# Patient Record
Sex: FEMALE | Race: BLACK OR AFRICAN AMERICAN | Employment: UNEMPLOYED | URBAN - METROPOLITAN AREA
[De-identification: names, ages, dates, MRNs, and addresses within clinical notes are randomized per-mention and may not be internally consistent; named-entity substitution may affect disease eponyms.]

---

## 2017-01-09 ENCOUNTER — HOSPITAL ENCOUNTER (OUTPATIENT)
Dept: PHYSICAL THERAPY | Age: 57
Discharge: HOME | End: 2017-01-31
Attending: EMERGENCY MEDICINE | Admitting: EMERGENCY MEDICINE

## 2017-01-09 ASSESSMENT — PAIN SCALES - GENERAL: PAINLEVEL_OUTOF10: 6

## 2017-01-09 ASSESSMENT — PAIN DESCRIPTION - ORIENTATION: ORIENTATION: POSTERIOR;LOWER

## 2017-01-09 ASSESSMENT — PAIN DESCRIPTION - FREQUENCY: FREQUENCY: CONTINUOUS

## 2017-01-09 ASSESSMENT — PAIN DESCRIPTION - DESCRIPTORS: DESCRIPTORS: ACHING;BURNING

## 2017-01-09 ASSESSMENT — PAIN DESCRIPTION - PAIN TYPE: TYPE: CHRONIC PAIN

## 2017-01-09 ASSESSMENT — PAIN DESCRIPTION - LOCATION: LOCATION: BACK

## 2017-01-12 ENCOUNTER — HOSPITAL ENCOUNTER (OUTPATIENT)
Dept: PHYSICAL THERAPY | Age: 57
Discharge: HOME | End: 2017-01-12
Admitting: EMERGENCY MEDICINE

## 2017-01-18 ENCOUNTER — HOSPITAL ENCOUNTER (OUTPATIENT)
Dept: PHYSICAL THERAPY | Age: 57
Discharge: HOME | End: 2017-01-18
Admitting: EMERGENCY MEDICINE

## 2017-01-25 ENCOUNTER — HOSPITAL ENCOUNTER (OUTPATIENT)
Dept: PHYSICAL THERAPY | Age: 57
Discharge: HOME | End: 2017-01-25
Admitting: EMERGENCY MEDICINE

## 2017-01-30 ENCOUNTER — HOSPITAL ENCOUNTER (OUTPATIENT)
Dept: PHYSICAL THERAPY | Age: 57
Discharge: HOME | End: 2017-01-30
Admitting: EMERGENCY MEDICINE

## 2017-02-01 ENCOUNTER — HOSPITAL ENCOUNTER (OUTPATIENT)
Dept: OTHER | Age: 57
Discharge: HOME | End: 2017-02-28
Attending: EMERGENCY MEDICINE | Admitting: EMERGENCY MEDICINE

## 2017-03-01 ENCOUNTER — HOSPITAL ENCOUNTER (OUTPATIENT)
Dept: OTHER | Age: 57
Discharge: HOME | End: 2017-03-31
Attending: EMERGENCY MEDICINE | Admitting: EMERGENCY MEDICINE

## 2018-12-17 ENCOUNTER — APPOINTMENT (OUTPATIENT)
Dept: CT IMAGING | Age: 58
End: 2018-12-17
Payer: COMMERCIAL

## 2018-12-17 ENCOUNTER — APPOINTMENT (OUTPATIENT)
Dept: GENERAL RADIOLOGY | Age: 58
End: 2018-12-17
Payer: COMMERCIAL

## 2018-12-17 ENCOUNTER — HOSPITAL ENCOUNTER (OUTPATIENT)
Age: 58
Setting detail: OBSERVATION
Discharge: HOME | End: 2018-12-19
Attending: EMERGENCY MEDICINE | Admitting: INTERNAL MEDICINE
Payer: COMMERCIAL

## 2018-12-17 DIAGNOSIS — N39.0 URINARY TRACT INFECTION WITHOUT HEMATURIA, SITE UNSPECIFIED: ICD-10-CM

## 2018-12-17 DIAGNOSIS — R42 DIZZINESS: Primary | ICD-10-CM

## 2018-12-17 DIAGNOSIS — R00.2 PALPITATIONS: ICD-10-CM

## 2018-12-17 LAB
ALBUMIN SERPL-MCNC: 4.4 GM/DL (ref 3.4–5)
ALP BLD-CCNC: 94 IU/L (ref 40–129)
ALT SERPL-CCNC: 12 U/L (ref 10–40)
ANION GAP SERPL CALCULATED.3IONS-SCNC: 12 MMOL/L (ref 4–16)
AST SERPL-CCNC: 15 IU/L (ref 15–37)
BACTERIA: ABNORMAL /HPF
BASOPHILS ABSOLUTE: 0.1 K/CU MM
BASOPHILS RELATIVE PERCENT: 0.8 % (ref 0–1)
BILIRUB SERPL-MCNC: 0.3 MG/DL (ref 0–1)
BILIRUBIN URINE: NEGATIVE MG/DL
BLOOD, URINE: NEGATIVE
BUN BLDV-MCNC: 16 MG/DL (ref 6–23)
CALCIUM SERPL-MCNC: 9.8 MG/DL (ref 8.3–10.6)
CHLORIDE BLD-SCNC: 98 MMOL/L (ref 99–110)
CLARITY: ABNORMAL
CO2: 26 MMOL/L (ref 21–32)
COLOR: YELLOW
CREAT SERPL-MCNC: 0.9 MG/DL (ref 0.6–1.1)
DIFFERENTIAL TYPE: ABNORMAL
EOSINOPHILS ABSOLUTE: 0.1 K/CU MM
EOSINOPHILS RELATIVE PERCENT: 2 % (ref 0–3)
GFR AFRICAN AMERICAN: >60 ML/MIN/1.73M2
GFR NON-AFRICAN AMERICAN: >60 ML/MIN/1.73M2
GLUCOSE BLD-MCNC: 125 MG/DL (ref 70–99)
GLUCOSE, URINE: NEGATIVE MG/DL
HCT VFR BLD CALC: 42.7 % (ref 37–47)
HEMOGLOBIN: 13.3 GM/DL (ref 12.5–16)
IMMATURE NEUTROPHIL %: 0.2 % (ref 0–0.43)
KETONES, URINE: NEGATIVE MG/DL
LEUKOCYTE ESTERASE, URINE: ABNORMAL
LYMPHOCYTES ABSOLUTE: 1.5 K/CU MM
LYMPHOCYTES RELATIVE PERCENT: 23.9 % (ref 24–44)
MAGNESIUM: 1.9 MG/DL (ref 1.8–2.4)
MCH RBC QN AUTO: 28.8 PG (ref 27–31)
MCHC RBC AUTO-ENTMCNC: 31.1 % (ref 32–36)
MCV RBC AUTO: 92.4 FL (ref 78–100)
MONOCYTES ABSOLUTE: 0.7 K/CU MM
MONOCYTES RELATIVE PERCENT: 10.9 % (ref 0–4)
MUCUS: ABNORMAL HPF
NITRITE URINE, QUANTITATIVE: NEGATIVE
NUCLEATED RBC %: 0 %
PDW BLD-RTO: 13.2 % (ref 11.7–14.9)
PH, URINE: 6 (ref 5–8)
PLATELET # BLD: 348 K/CU MM (ref 140–440)
PMV BLD AUTO: 9.3 FL (ref 7.5–11.1)
POTASSIUM SERPL-SCNC: 3.9 MMOL/L (ref 3.5–5.1)
PROTEIN UA: NEGATIVE MG/DL
RBC # BLD: 4.62 M/CU MM (ref 4.2–5.4)
RBC URINE: 1 /HPF (ref 0–6)
SEGMENTED NEUTROPHILS ABSOLUTE COUNT: 3.8 K/CU MM
SEGMENTED NEUTROPHILS RELATIVE PERCENT: 62.2 % (ref 36–66)
SODIUM BLD-SCNC: 136 MMOL/L (ref 135–145)
SPECIFIC GRAVITY UA: 1.01 (ref 1–1.03)
SQUAMOUS EPITHELIAL: 29 /HPF
TOTAL IMMATURE NEUTOROPHIL: 0.01 K/CU MM
TOTAL NUCLEATED RBC: 0 K/CU MM
TOTAL PROTEIN: 8.4 GM/DL (ref 6.4–8.2)
TRICHOMONAS: ABNORMAL /HPF
TROPONIN T: <0.01 NG/ML
TSH HIGH SENSITIVITY: 1.25 UIU/ML (ref 0.27–4.2)
UNCLASSIFIED CAST: 21 /LPF
UROBILINOGEN, URINE: NORMAL MG/DL (ref 0.2–1)
WBC # BLD: 6.1 K/CU MM (ref 4–10.5)
WBC UA: 7 /HPF (ref 0–5)

## 2018-12-17 PROCEDURE — 6370000000 HC RX 637 (ALT 250 FOR IP): Performed by: EMERGENCY MEDICINE

## 2018-12-17 PROCEDURE — 2580000003 HC RX 258: Performed by: PHYSICIAN ASSISTANT

## 2018-12-17 PROCEDURE — 93226 XTRNL ECG REC<48 HR SCAN A/R: CPT

## 2018-12-17 PROCEDURE — 70496 CT ANGIOGRAPHY HEAD: CPT

## 2018-12-17 PROCEDURE — 84443 ASSAY THYROID STIM HORMONE: CPT

## 2018-12-17 PROCEDURE — 96365 THER/PROPH/DIAG IV INF INIT: CPT

## 2018-12-17 PROCEDURE — 71045 X-RAY EXAM CHEST 1 VIEW: CPT

## 2018-12-17 PROCEDURE — 81001 URINALYSIS AUTO W/SCOPE: CPT

## 2018-12-17 PROCEDURE — 87086 URINE CULTURE/COLONY COUNT: CPT

## 2018-12-17 PROCEDURE — 84484 ASSAY OF TROPONIN QUANT: CPT

## 2018-12-17 PROCEDURE — 70450 CT HEAD/BRAIN W/O DYE: CPT

## 2018-12-17 PROCEDURE — 6360000002 HC RX W HCPCS: Performed by: PHYSICIAN ASSISTANT

## 2018-12-17 PROCEDURE — 6360000004 HC RX CONTRAST MEDICATION: Performed by: PHYSICIAN ASSISTANT

## 2018-12-17 PROCEDURE — 85025 COMPLETE CBC W/AUTO DIFF WBC: CPT

## 2018-12-17 PROCEDURE — 70498 CT ANGIOGRAPHY NECK: CPT

## 2018-12-17 PROCEDURE — 80053 COMPREHEN METABOLIC PANEL: CPT

## 2018-12-17 PROCEDURE — 83735 ASSAY OF MAGNESIUM: CPT

## 2018-12-17 PROCEDURE — 36415 COLL VENOUS BLD VENIPUNCTURE: CPT

## 2018-12-17 PROCEDURE — 99285 EMERGENCY DEPT VISIT HI MDM: CPT

## 2018-12-17 PROCEDURE — 93005 ELECTROCARDIOGRAM TRACING: CPT | Performed by: EMERGENCY MEDICINE

## 2018-12-17 RX ORDER — MECLIZINE HYDROCHLORIDE 25 MG/1
25 TABLET ORAL ONCE
Status: COMPLETED | OUTPATIENT
Start: 2018-12-17 | End: 2018-12-17

## 2018-12-17 RX ORDER — TRIMETHOPRIM 100 MG/1
100 TABLET ORAL 2 TIMES DAILY
Status: ON HOLD | COMMUNITY
End: 2018-12-19 | Stop reason: HOSPADM

## 2018-12-17 RX ADMIN — CEFTRIAXONE SODIUM 1 G: 1 INJECTION, POWDER, FOR SOLUTION INTRAMUSCULAR; INTRAVENOUS at 23:25

## 2018-12-17 RX ADMIN — MECLIZINE HYDROCHLORIDE 25 MG: 25 TABLET ORAL at 22:00

## 2018-12-17 RX ADMIN — IOPAMIDOL 100 ML: 755 INJECTION, SOLUTION INTRAVENOUS at 22:35

## 2018-12-17 NOTE — Clinical Note
Patient Class: Observation [104]   REQUIRED: Diagnosis: Dizziness [306134]   Estimated Length of Stay: Estimated stay of less than 2 midnights   Future Attending Provider: Calvin Pham [[id number]]

## 2018-12-18 LAB
LV EF: 60 %
LVEF MODALITY: NORMAL

## 2018-12-18 PROCEDURE — 93306 TTE W/DOPPLER COMPLETE: CPT

## 2018-12-18 PROCEDURE — 2580000003 HC RX 258: Performed by: INTERNAL MEDICINE

## 2018-12-18 PROCEDURE — 93225 XTRNL ECG REC<48 HRS REC: CPT

## 2018-12-18 PROCEDURE — 6360000002 HC RX W HCPCS: Performed by: FAMILY MEDICINE

## 2018-12-18 PROCEDURE — 96372 THER/PROPH/DIAG INJ SC/IM: CPT

## 2018-12-18 PROCEDURE — G0378 HOSPITAL OBSERVATION PER HR: HCPCS

## 2018-12-18 PROCEDURE — 6360000002 HC RX W HCPCS: Performed by: INTERNAL MEDICINE

## 2018-12-18 PROCEDURE — 2580000003 HC RX 258: Performed by: FAMILY MEDICINE

## 2018-12-18 PROCEDURE — 6370000000 HC RX 637 (ALT 250 FOR IP): Performed by: INTERNAL MEDICINE

## 2018-12-18 RX ORDER — SODIUM CHLORIDE 0.9 % (FLUSH) 0.9 %
10 SYRINGE (ML) INJECTION PRN
Status: DISCONTINUED | OUTPATIENT
Start: 2018-12-18 | End: 2018-12-19 | Stop reason: HOSPADM

## 2018-12-18 RX ORDER — ONDANSETRON 2 MG/ML
4 INJECTION INTRAMUSCULAR; INTRAVENOUS EVERY 6 HOURS PRN
Status: DISCONTINUED | OUTPATIENT
Start: 2018-12-18 | End: 2018-12-18 | Stop reason: SDUPTHER

## 2018-12-18 RX ORDER — AMLODIPINE BESYLATE 10 MG/1
10 TABLET ORAL DAILY
Status: DISCONTINUED | OUTPATIENT
Start: 2018-12-18 | End: 2018-12-19

## 2018-12-18 RX ORDER — ONDANSETRON 2 MG/ML
4 INJECTION INTRAMUSCULAR; INTRAVENOUS EVERY 6 HOURS PRN
Status: DISCONTINUED | OUTPATIENT
Start: 2018-12-18 | End: 2018-12-19 | Stop reason: HOSPADM

## 2018-12-18 RX ORDER — TRIAMTERENE AND HYDROCHLOROTHIAZIDE 37.5; 25 MG/1; MG/1
1 TABLET ORAL DAILY
Status: DISCONTINUED | OUTPATIENT
Start: 2018-12-18 | End: 2018-12-19

## 2018-12-18 RX ORDER — ATENOLOL 50 MG/1
50 TABLET ORAL 2 TIMES DAILY
Status: DISCONTINUED | OUTPATIENT
Start: 2018-12-18 | End: 2018-12-19 | Stop reason: HOSPADM

## 2018-12-18 RX ORDER — SODIUM CHLORIDE 0.9 % (FLUSH) 0.9 %
10 SYRINGE (ML) INJECTION EVERY 12 HOURS SCHEDULED
Status: DISCONTINUED | OUTPATIENT
Start: 2018-12-18 | End: 2018-12-19 | Stop reason: HOSPADM

## 2018-12-18 RX ORDER — TRIMETHOPRIM 100 MG/1
100 TABLET ORAL 2 TIMES DAILY
Status: DISCONTINUED | OUTPATIENT
Start: 2018-12-18 | End: 2018-12-18

## 2018-12-18 RX ADMIN — ATENOLOL 50 MG: 50 TABLET ORAL at 02:16

## 2018-12-18 RX ADMIN — ATENOLOL 50 MG: 50 TABLET ORAL at 09:44

## 2018-12-18 RX ADMIN — ENOXAPARIN SODIUM 40 MG: 40 INJECTION SUBCUTANEOUS at 09:44

## 2018-12-18 RX ADMIN — SODIUM CHLORIDE, PRESERVATIVE FREE 10 ML: 5 INJECTION INTRAVENOUS at 21:08

## 2018-12-18 RX ADMIN — AMLODIPINE BESYLATE 10 MG: 10 TABLET ORAL at 09:44

## 2018-12-18 RX ADMIN — CEFTRIAXONE 1 G: 1 INJECTION, POWDER, FOR SOLUTION INTRAMUSCULAR; INTRAVENOUS at 21:08

## 2018-12-18 RX ADMIN — TRIAMTERENE AND HYDROCHLOROTHIAZIDE 1 TABLET: 37.5; 25 TABLET ORAL at 09:44

## 2018-12-18 RX ADMIN — SODIUM CHLORIDE, PRESERVATIVE FREE 10 ML: 5 INJECTION INTRAVENOUS at 11:04

## 2018-12-18 ASSESSMENT — PAIN SCALES - GENERAL
PAINLEVEL_OUTOF10: 0

## 2018-12-18 NOTE — H&P
mouth 2 times daily   Yes Historical Provider, MD   atenolol (TENORMIN) 50 MG tablet Take 50 mg by mouth 2 times daily   Yes Historical Provider, MD   amLODIPine (NORVASC) 10 MG tablet Take 10 mg by mouth daily   Yes Historical Provider, MD   triamterene-hydrochlorothiazide (MAXZIDE-25) 37.5-25 MG per tablet Take 1 tablet by mouth daily   Yes Historical Provider, MD   ondansetron (ZOFRAN ODT) 4 MG disintegrating tablet Take 1 tablet by mouth every 8 hours as needed for Nausea or Vomiting 4/17/16   Deangelo Tripathi MD   naproxen (NAPROSYN) 500 MG tablet Take 1 tablet by mouth 2 times daily as needed for Pain 8/13/15 8/23/15  Jorje Kehr Brumfield, PA      Medications:    cefTRIAXone (ROCEPHIN) IV  1 g Intravenous Once      Infusions:   PRN Meds:        Electronically signed by Clary Javed MD on 12/17/2018 at 11:46 PM

## 2018-12-18 NOTE — ED NOTES
Handoff report called to RN [facility] on 3N     Dianna Lux RN  12/18/18 [id number]

## 2018-12-18 NOTE — ED PROVIDER NOTES
I independently examined and evaluated Néstor Schwartz. In brief, presenting with dizziness lasting approx 5-10 mins. She reports she is having difficulty with walking. Endorsing some palpitations. Vitals:    18 1836   BP: (!) 159/82   Pulse: 62   Resp: 16   Temp: 97.8 °F (36.6 °C)   SpO2: 99%     Focused exam revealed     General appearance:  No acute distress. Skin:  Warm. Dry. Eye:  Extraocular movements intact. Ears, nose, mouth and throat:  Oral mucosa moist   Neck:  Trachea midline. Extremity:  No swelling. range of motion intact  Heart:  Regular rate and rhythm, normal S1 & S2, no extra heart sounds. Perfusion:  intact  Respiratory:  Lungs clear to auscultation bilaterally. Respirations nonlabored. Abdominal:  Normal bowel sounds. Soft. Nontender. Non distended. Back:  No CVA tenderness to palpation     NEUROLOGICAL: Awake and alert. GCS 15. Cranial nerves 2-12 grossly intact. Strength 5/5 throughout. Light touch sensation intact throughout. Finger to nose WNL. DTR's 2+ in all 4 extremities. Abnormal gait         EK lead EKG per my interpretation:  Normal Sinus Rhythm 72  Axis is   Normal  QTc is  within an acceptable range  There is no specific T wave changes appreciated. There is no specific ST wave changes appreciated. Prior EKG to compare with was available 2018      ED course:  62-year-old female with complaints of dizziness. Labs noted to be remarkable for leukocytosis and WBC. However upon ambulation she is reporting symptoms. Patient hemodynamically stable in the emergency department. CT had unremarkable as well. Given that patient has not followed up with a cardiologist and continues to be symptomatic plan for patient to be admitted for further workup. All diagnostic, treatment, and disposition decisions were made by myself in conjunction with the advanced practice provider.     For all further details of the patient's emergency department visit,

## 2018-12-18 NOTE — PROGRESS NOTES
Hospitalist Progress Note      Name:  Heriberto Harvey /Age/Sex: 1960  (63 y.o. female)   MRN & CSN:  [id number] & [id number] Admission Date/Time: 2018  8:02 PM   Location:  [address] [place] [address] PCP: Ace Diaz MD       Heriberto Harvey is a 63 y.o.  female  who presents with Dizziness (x2 today while sitting )      Assessment and Plan:   Dizziness, unsteady gait with recurrent palpitation  Echo pending  Pt/ot  Orthostatics   tele  Cardio consulted, possible holter? UTI  - IV Rocephin   - urine cx pending       HTN  - Borderline hypotensive now, hold meds with parameters, can be causing dizziness            Diet DIET CARDIAC;   Code Status Full Code     Medications:   Medications:    amLODIPine  10 mg Oral Daily    atenolol  50 mg Oral BID    trimethoprim  100 mg Oral BID    triamterene-hydrochlorothiazide  1 tablet Oral Daily    sodium chloride flush  10 mL Intravenous 2 times per day    enoxaparin  40 mg Subcutaneous Daily    sodium chloride flush  10 mL Intravenous 2 times per day      Infusions:   PRN Meds:   sodium chloride flush 10 mL PRN   magnesium hydroxide 30 mL Daily PRN   ondansetron 4 mg Q6H PRN   sodium chloride flush 10 mL PRN     Subjective:   Feels a bit better this am    Objective:   No intake or output data in the 24 hours ending 18 1006   Vitals:   Vitals:    18 0800   BP: 111/72   Pulse: 77   Resp:    Temp:    SpO2: 98%     Physical Exam:   Gen:  awake, alert, cooperative, no apparent distress  Head/Eyes:  Normocephalic atraumatic, EOMI   NECK:   symmetrical, trachea midline  LUNGS: Normal Effort   CARDIOVASCULAR:  Normal rate  ABDOMEN: Non tender, non distended, no HSM noted. MUSCULOSKELETAL:  ROM WNL  NEUROLOGIC: Alert and Oriented,  Cranial nerves II-XII are grossly intact.    SKIN:  no bruising or bleeding, normal skin color,  no redness      Data:       CBC   Recent Labs      18   WBC  6.1   HGB  13.3   HCT  42.7   PLT  348      BMP Recent Labs

## 2018-12-19 ENCOUNTER — APPOINTMENT (OUTPATIENT)
Dept: NUCLEAR MEDICINE | Age: 58
End: 2018-12-19
Payer: COMMERCIAL

## 2018-12-19 VITALS
TEMPERATURE: 98.3 F | HEART RATE: 81 BPM | RESPIRATION RATE: 17 BRPM | WEIGHT: 208.6 LBS | OXYGEN SATURATION: 96 % | HEIGHT: 67 IN | DIASTOLIC BLOOD PRESSURE: 68 MMHG | SYSTOLIC BLOOD PRESSURE: 120 MMHG | BODY MASS INDEX: 32.74 KG/M2

## 2018-12-19 LAB
ACQUISITION DURATION: NORMAL S
AVERAGE HEART RATE: 70 BPM
CULTURE: NORMAL
EKG DIAGNOSIS: NORMAL
HOOKUP DATE: NORMAL
HOOKUP TIME: NORMAL
LV EF: 60 %
LVEF MODALITY: NORMAL
Lab: NORMAL
MAX HEART RATE TIME/DATE: NORMAL
MAX HEART RATE: 121 BPM
MIN HEART RATE TIME/DATE: NORMAL
MIN HEART RATE: 56 BPM
NUMBER OF QRS COMPLEXES: NORMAL
NUMBER OF SUPRAVENTRICULAR BEATS IN RUNS: 0
NUMBER OF SUPRAVENTRICULAR COUPLETS: 1
NUMBER OF SUPRAVENTRICULAR ECTOPICS: 9
NUMBER OF SUPRAVENTRICULAR ISOLATED BEATS: 7
NUMBER OF SUPRAVENTRICULAR RUNS: 0
NUMBER OF VENTRICULAR BEATS IN RUNS: 0
NUMBER OF VENTRICULAR BIGEMINAL CYCLES: 3
NUMBER OF VENTRICULAR COUPLETS: 0
NUMBER OF VENTRICULAR ECTOPICS: 108
NUMBER OF VENTRICULAR ISOLATED BEATS: 108
NUMBER OF VENTRICULAR RUNS: 0
REPORT STATUS: NORMAL
SPECIMEN: NORMAL
TOTAL COLONY COUNT: NORMAL

## 2018-12-19 PROCEDURE — G8980 MOBILITY D/C STATUS: HCPCS

## 2018-12-19 PROCEDURE — 3430000000 HC RX DIAGNOSTIC RADIOPHARMACEUTICAL: Performed by: INTERNAL MEDICINE

## 2018-12-19 PROCEDURE — G8979 MOBILITY GOAL STATUS: HCPCS

## 2018-12-19 PROCEDURE — 94761 N-INVAS EAR/PLS OXIMETRY MLT: CPT

## 2018-12-19 PROCEDURE — A9500 TC99M SESTAMIBI: HCPCS | Performed by: INTERNAL MEDICINE

## 2018-12-19 PROCEDURE — G0378 HOSPITAL OBSERVATION PER HR: HCPCS

## 2018-12-19 PROCEDURE — 6360000002 HC RX W HCPCS: Performed by: INTERNAL MEDICINE

## 2018-12-19 PROCEDURE — 93017 CV STRESS TEST TRACING ONLY: CPT

## 2018-12-19 PROCEDURE — 97116 GAIT TRAINING THERAPY: CPT

## 2018-12-19 PROCEDURE — 78452 HT MUSCLE IMAGE SPECT MULT: CPT

## 2018-12-19 PROCEDURE — 97162 PT EVAL MOD COMPLEX 30 MIN: CPT

## 2018-12-19 PROCEDURE — 6370000000 HC RX 637 (ALT 250 FOR IP): Performed by: FAMILY MEDICINE

## 2018-12-19 PROCEDURE — G8978 MOBILITY CURRENT STATUS: HCPCS

## 2018-12-19 PROCEDURE — 93010 ELECTROCARDIOGRAM REPORT: CPT | Performed by: INTERNAL MEDICINE

## 2018-12-19 RX ORDER — CIPROFLOXACIN 500 MG/1
500 TABLET, FILM COATED ORAL 2 TIMES DAILY
Qty: 14 TABLET | Refills: 0 | Status: SHIPPED | OUTPATIENT
Start: 2018-12-19 | End: 2018-12-26

## 2018-12-19 RX ADMIN — REGADENOSON 0.4 MG: 0.08 INJECTION, SOLUTION INTRAVENOUS at 09:35

## 2018-12-19 RX ADMIN — Medication 10 MILLICURIE: at 07:35

## 2018-12-19 RX ADMIN — Medication 30 MILLICURIE: at 09:35

## 2018-12-19 RX ADMIN — ATENOLOL 50 MG: 50 TABLET ORAL at 11:32

## 2018-12-19 ASSESSMENT — PAIN SCALES - GENERAL
PAINLEVEL_OUTOF10: 0
PAINLEVEL_OUTOF10: 0

## 2018-12-19 NOTE — DISCHARGE SUMMARY
Desmond Madsen 1960 [id number]  PCP:  Nessa Guerrero MD    Admit date: 12/17/2018  Admitting Physician: Juanita Baires MD    Discharge date: 12/19/2018 Discharge Physician: Mamie Rivas MD         Hospital Course and Discharge Diagnoses Include:    Pt feels well and wants to go home. Dizziness, unsteady gait with recurrent palpitation  - resolved likely was due to hypotension from too many BP meds, + orthostatics. Stopped norvasc and maxide per cardio recs. Okay for BB  Orthostatics  EKG; NSR with no acute changes  Cardio consulted, stress test done and showed no ischemia     UTI  - IV Rocephin   - urine cx + opportunistic pathogen but since symptomatic will d/c on oral cipro x 7 days        HTN  - stable        Physical Exam on Discharge date: 12/19/18  Gen:  awake, alert, cooperative, no apparent distress  Head/Eyes:  Normocephalic atraumatic, EOMI   NECK:   symmetrical, trachea midline  LUNGS: Normal Effort   CARDIOVASCULAR:  Normal rate  ABDOMEN: Non tender, non distended, no HSM noted. MUSCULOSKELETAL:  ROM WNL  NEUROLOGIC: Alert and Oriented,  Cranial nerves II-XII are grossly intact. SKIN:  no bruising or bleeding, normal skin color,  no redness    Procedures:  See above  Ct Head Wo Contrast    Result Date: 12/17/2018  EXAMINATION: CT OF THE HEAD WITHOUT CONTRAST  12/17/2018 9:27 pm TECHNIQUE: CT of the head was performed without the administration of intravenous contrast. Dose modulation, iterative reconstruction, and/or weight based adjustment of the mA/kV was utilized to reduce the radiation dose to as low as reasonably achievable. COMPARISON: None. HISTORY: ORDERING SYSTEM PROVIDED HISTORY: dizziness TECHNOLOGIST PROVIDED HISTORY: Has a \"code stroke\" or \"stroke alert\" been called? ->No Ordering Physician Provided Reason for Exam: dizziness FINDINGS: BRAIN/VENTRICLES: There is no acute intracranial hemorrhage, mass effect or midline shift. No abnormal extra-axial fluid collection.   The of the larynx and pharynx appear unremarkable. The parotid, submandibular and thyroid glands demonstrate no acute abnormality. BONES: The visualized osseous structures appear unremarkable. CTA HEAD: ANTERIOR CIRCULATION: The internal carotid arteries are normal in course and caliber without focal stenosis. The anterior cerebral and middle cerebral arteries demonstrate no focal stenosis. POSTERIOR CIRCULATION: The right V4 segment is congenitally diminutive. The posterior cerebral arteries demonstrate no focal stenosis. The vertebral and basilar arteries appear unremarkable. BRAIN: No mass effect or midline shift. No abnormal extra-axial fluid collection. The gray-white differentiation appears grossly maintained. Unremarkable CTA of the head and neck. Cta Head W Contrast    Result Date: 12/17/2018  EXAMINATION: CTA OF THE NECK; CTA OF THE HEAD WITH CONTRAST 12/17/2018 10:47 pm; 12/17/2018 10:46 pm: TECHNIQUE: CTA of the neck was performed with the administration of intravenous contrast. Multiplanar reformatted images are provided for review. MIP images are provided for review. Stenosis of the internal carotid arteries measured using NASCET criteria. Dose modulation, iterative reconstruction, and/or weight based adjustment of the mA/kV was utilized to reduce the radiation dose to as low as reasonably achievable.; CTA of the head/brain was performed with the administration of intravenous contrast. Multiplanar reformatted images are provided for review. MIP images are provided for review. Dose modulation, iterative reconstruction, and/or weight based adjustment of the mA/kV was utilized to reduce the radiation dose to as low as reasonably achievable. COMPARISON: None. HISTORY: ORDERING SYSTEM PROVIDED HISTORY: dizziness TECHNOLOGIST PROVIDED HISTORY: Has a \"code stroke\" or \"stroke alert\" been called? ->No Ordering Physician Provided Reason for Exam:  two episodes of dizziness while sitting today, states she

## 2018-12-20 LAB
EKG ATRIAL RATE: 72 BPM
EKG DIAGNOSIS: NORMAL
EKG P AXIS: 70 DEGREES
EKG P-R INTERVAL: 188 MS
EKG Q-T INTERVAL: 398 MS
EKG QRS DURATION: 84 MS
EKG QTC CALCULATION (BAZETT): 435 MS
EKG R AXIS: 79 DEGREES
EKG T AXIS: 72 DEGREES
EKG VENTRICULAR RATE: 72 BPM

## 2019-12-17 ENCOUNTER — OFFICE VISIT (OUTPATIENT)
Dept: FAMILY MEDICINE CLINIC | Age: 59
End: 2019-12-17
Payer: MEDICARE

## 2019-12-17 VITALS
OXYGEN SATURATION: 97 % | HEART RATE: 73 BPM | WEIGHT: 211 LBS | BODY MASS INDEX: 33.05 KG/M2 | SYSTOLIC BLOOD PRESSURE: 130 MMHG | DIASTOLIC BLOOD PRESSURE: 84 MMHG

## 2019-12-17 DIAGNOSIS — R73.9 HYPERGLYCEMIA: ICD-10-CM

## 2019-12-17 DIAGNOSIS — I10 ESSENTIAL HYPERTENSION: Primary | ICD-10-CM

## 2019-12-17 DIAGNOSIS — R53.82 CHRONIC FATIGUE: ICD-10-CM

## 2019-12-17 LAB — HBA1C MFR BLD: 6.2 %

## 2019-12-17 PROCEDURE — G8417 CALC BMI ABV UP PARAM F/U: HCPCS | Performed by: FAMILY MEDICINE

## 2019-12-17 PROCEDURE — 1036F TOBACCO NON-USER: CPT | Performed by: FAMILY MEDICINE

## 2019-12-17 PROCEDURE — 99203 OFFICE O/P NEW LOW 30 MIN: CPT | Performed by: FAMILY MEDICINE

## 2019-12-17 PROCEDURE — 3017F COLORECTAL CA SCREEN DOC REV: CPT | Performed by: FAMILY MEDICINE

## 2019-12-17 PROCEDURE — 83036 HEMOGLOBIN GLYCOSYLATED A1C: CPT | Performed by: FAMILY MEDICINE

## 2019-12-17 PROCEDURE — G8427 DOCREV CUR MEDS BY ELIG CLIN: HCPCS | Performed by: FAMILY MEDICINE

## 2019-12-17 PROCEDURE — G8484 FLU IMMUNIZE NO ADMIN: HCPCS | Performed by: FAMILY MEDICINE

## 2019-12-17 RX ORDER — HYDROCHLOROTHIAZIDE 12.5 MG/1
12.5 CAPSULE, GELATIN COATED ORAL DAILY
Refills: 1 | COMMUNITY
Start: 2019-12-11 | End: 2019-12-17

## 2019-12-17 RX ORDER — ATENOLOL 50 MG/1
50 TABLET ORAL 2 TIMES DAILY
Qty: 180 TABLET | Refills: 5 | Status: SHIPPED | OUTPATIENT
Start: 2019-12-17 | End: 2020-03-17 | Stop reason: SDUPTHER

## 2019-12-17 RX ORDER — SULFAMETHOXAZOLE AND TRIMETHOPRIM 800; 160 MG/1; MG/1
1 TABLET ORAL
COMMUNITY
End: 2019-12-17

## 2019-12-17 ASSESSMENT — ENCOUNTER SYMPTOMS
VISUAL CHANGE: 0
COUGH: 0
SINUS PRESSURE: 0
ABDOMINAL PAIN: 0
DIARRHEA: 0
SHORTNESS OF BREATH: 0
BACK PAIN: 0
CHEST TIGHTNESS: 0
VOMITING: 0
NAUSEA: 0
SWOLLEN GLANDS: 0
WHEEZING: 0
CHANGE IN BOWEL HABIT: 0
SORE THROAT: 0
CONSTIPATION: 0

## 2019-12-17 ASSESSMENT — PATIENT HEALTH QUESTIONNAIRE - PHQ9
2. FEELING DOWN, DEPRESSED OR HOPELESS: 0
SUM OF ALL RESPONSES TO PHQ9 QUESTIONS 1 & 2: 0
SUM OF ALL RESPONSES TO PHQ QUESTIONS 1-9: 0
1. LITTLE INTEREST OR PLEASURE IN DOING THINGS: 0
SUM OF ALL RESPONSES TO PHQ QUESTIONS 1-9: 0

## 2020-01-28 ENCOUNTER — NURSE ONLY (OUTPATIENT)
Dept: FAMILY MEDICINE CLINIC | Age: 60
End: 2020-01-28
Payer: MEDICARE

## 2020-01-28 DIAGNOSIS — I10 ESSENTIAL HYPERTENSION: ICD-10-CM

## 2020-01-28 DIAGNOSIS — R53.82 CHRONIC FATIGUE: ICD-10-CM

## 2020-01-28 LAB
A/G RATIO: 1.1 (ref 1.1–2.2)
ALBUMIN SERPL-MCNC: 4.4 G/DL (ref 3.4–5)
ALP BLD-CCNC: 83 U/L (ref 40–129)
ALT SERPL-CCNC: 17 U/L (ref 10–40)
ANION GAP SERPL CALCULATED.3IONS-SCNC: 14 MMOL/L (ref 3–16)
AST SERPL-CCNC: 18 U/L (ref 15–37)
BASOPHILS ABSOLUTE: 0.1 K/UL (ref 0–0.2)
BASOPHILS RELATIVE PERCENT: 1 %
BILIRUB SERPL-MCNC: 0.3 MG/DL (ref 0–1)
BUN BLDV-MCNC: 14 MG/DL (ref 7–20)
CALCIUM SERPL-MCNC: 9.8 MG/DL (ref 8.3–10.6)
CHLORIDE BLD-SCNC: 103 MMOL/L (ref 99–110)
CHOLESTEROL, TOTAL: 254 MG/DL (ref 0–199)
CO2: 25 MMOL/L (ref 21–32)
CREAT SERPL-MCNC: 0.8 MG/DL (ref 0.6–1.1)
EOSINOPHILS ABSOLUTE: 0.1 K/UL (ref 0–0.6)
EOSINOPHILS RELATIVE PERCENT: 1.2 %
GFR AFRICAN AMERICAN: >60
GFR NON-AFRICAN AMERICAN: >60
GLOBULIN: 4.1 G/DL
GLUCOSE BLD-MCNC: 93 MG/DL (ref 70–99)
HCT VFR BLD CALC: 40.4 % (ref 36–48)
HDLC SERPL-MCNC: 81 MG/DL (ref 40–60)
HEMOGLOBIN: 13.6 G/DL (ref 12–16)
LDL CHOLESTEROL CALCULATED: 159 MG/DL
LYMPHOCYTES ABSOLUTE: 1.2 K/UL (ref 1–5.1)
LYMPHOCYTES RELATIVE PERCENT: 18.1 %
MCH RBC QN AUTO: 29.4 PG (ref 26–34)
MCHC RBC AUTO-ENTMCNC: 33.6 G/DL (ref 31–36)
MCV RBC AUTO: 87.5 FL (ref 80–100)
MONOCYTES ABSOLUTE: 0.5 K/UL (ref 0–1.3)
MONOCYTES RELATIVE PERCENT: 7.3 %
NEUTROPHILS ABSOLUTE: 4.7 K/UL (ref 1.7–7.7)
NEUTROPHILS RELATIVE PERCENT: 72.4 %
PDW BLD-RTO: 13.7 % (ref 12.4–15.4)
PLATELET # BLD: 310 K/UL (ref 135–450)
PMV BLD AUTO: 8.1 FL (ref 5–10.5)
POTASSIUM SERPL-SCNC: 4.1 MMOL/L (ref 3.5–5.1)
RBC # BLD: 4.62 M/UL (ref 4–5.2)
SODIUM BLD-SCNC: 142 MMOL/L (ref 136–145)
T4 FREE: 1.3 NG/DL (ref 0.9–1.8)
TOTAL PROTEIN: 8.5 G/DL (ref 6.4–8.2)
TRIGL SERPL-MCNC: 68 MG/DL (ref 0–150)
TSH SERPL DL<=0.05 MIU/L-ACNC: 1.65 UIU/ML (ref 0.27–4.2)
VLDLC SERPL CALC-MCNC: 14 MG/DL
WBC # BLD: 6.6 K/UL (ref 4–11)

## 2020-01-28 PROCEDURE — 36415 COLL VENOUS BLD VENIPUNCTURE: CPT | Performed by: FAMILY MEDICINE

## 2020-03-17 ENCOUNTER — OFFICE VISIT (OUTPATIENT)
Dept: FAMILY MEDICINE CLINIC | Age: 60
End: 2020-03-17
Payer: MEDICARE

## 2020-03-17 VITALS
WEIGHT: 222.2 LBS | HEIGHT: 67 IN | DIASTOLIC BLOOD PRESSURE: 80 MMHG | HEART RATE: 72 BPM | OXYGEN SATURATION: 97 % | SYSTOLIC BLOOD PRESSURE: 130 MMHG | BODY MASS INDEX: 34.88 KG/M2

## 2020-03-17 PROBLEM — R73.03 PREDIABETES: Status: ACTIVE | Noted: 2020-03-17

## 2020-03-17 PROBLEM — E78.2 MIXED HYPERLIPIDEMIA: Status: ACTIVE | Noted: 2020-03-17

## 2020-03-17 PROBLEM — I10 ESSENTIAL HYPERTENSION: Status: ACTIVE | Noted: 2020-03-17

## 2020-03-17 PROCEDURE — G8427 DOCREV CUR MEDS BY ELIG CLIN: HCPCS | Performed by: FAMILY MEDICINE

## 2020-03-17 PROCEDURE — 3017F COLORECTAL CA SCREEN DOC REV: CPT | Performed by: FAMILY MEDICINE

## 2020-03-17 PROCEDURE — 1036F TOBACCO NON-USER: CPT | Performed by: FAMILY MEDICINE

## 2020-03-17 PROCEDURE — G8484 FLU IMMUNIZE NO ADMIN: HCPCS | Performed by: FAMILY MEDICINE

## 2020-03-17 PROCEDURE — G8417 CALC BMI ABV UP PARAM F/U: HCPCS | Performed by: FAMILY MEDICINE

## 2020-03-17 PROCEDURE — 99213 OFFICE O/P EST LOW 20 MIN: CPT | Performed by: FAMILY MEDICINE

## 2020-03-17 RX ORDER — ATENOLOL 50 MG/1
50 TABLET ORAL 2 TIMES DAILY
Qty: 180 TABLET | Refills: 5 | Status: SHIPPED | OUTPATIENT
Start: 2020-03-17 | End: 2020-12-24 | Stop reason: ALTCHOICE

## 2020-03-17 ASSESSMENT — PATIENT HEALTH QUESTIONNAIRE - PHQ9
SUM OF ALL RESPONSES TO PHQ QUESTIONS 1-9: 0
1. LITTLE INTEREST OR PLEASURE IN DOING THINGS: 0
SUM OF ALL RESPONSES TO PHQ QUESTIONS 1-9: 0
2. FEELING DOWN, DEPRESSED OR HOPELESS: 0
SUM OF ALL RESPONSES TO PHQ9 QUESTIONS 1 & 2: 0

## 2020-03-17 ASSESSMENT — ENCOUNTER SYMPTOMS
COUGH: 0
SHORTNESS OF BREATH: 0

## 2020-03-17 NOTE — PROGRESS NOTES
Gini Denis  1960  03/17/20    Chief Complaint   Patient presents with    3 Month Follow-Up    Hypertension     and discuss the lab results           Patient here for 3 months f/u regarding HTN, and discuss the lab results, The patient is taking hypertensive medications compliantly without side effects. Denies chest pain, dyspnea, edema, or TIA's. Past Medical History:   Diagnosis Date    Arthritis     Hypertension      Past Surgical History:   Procedure Laterality Date     SECTION       History reviewed. No pertinent family history.   Social History     Socioeconomic History    Marital status: Legally      Spouse name: Not on file    Number of children: Not on file    Years of education: Not on file    Highest education level: Not on file   Occupational History    Not on file   Social Needs    Financial resource strain: Not on file    Food insecurity     Worry: Not on file     Inability: Not on file    Transportation needs     Medical: Not on file     Non-medical: Not on file   Tobacco Use    Smoking status: Never Smoker    Smokeless tobacco: Never Used   Substance and Sexual Activity    Alcohol use: No    Drug use: No    Sexual activity: Not on file   Lifestyle    Physical activity     Days per week: Not on file     Minutes per session: Not on file    Stress: Not on file   Relationships    Social connections     Talks on phone: Not on file     Gets together: Not on file     Attends Baptism service: Not on file     Active member of club or organization: Not on file     Attends meetings of clubs or organizations: Not on file     Relationship status: Not on file    Intimate partner violence     Fear of current or ex partner: Not on file     Emotionally abused: Not on file     Physically abused: Not on file     Forced sexual activity: Not on file   Other Topics Concern    Not on file   Social History Narrative    Not on file       No Known 3.2 oz (100.8 kg)   12/17/19 211 lb (95.7 kg)   12/19/18 208 lb 9.6 oz (94.6 kg)         Physical Exam  Constitutional:       General: She is not in acute distress. Appearance: Normal appearance. She is obese. She is not ill-appearing. HENT:      Head: Normocephalic and atraumatic. Neck:      Musculoskeletal: Normal range of motion and neck supple. Cardiovascular:      Rate and Rhythm: Normal rate and regular rhythm. Heart sounds: No murmur. Pulmonary:      Effort: Pulmonary effort is normal.      Breath sounds: Normal breath sounds. No wheezing. Musculoskeletal: Normal range of motion. Right lower leg: No edema. Neurological:      Mental Status: She is alert and oriented to person, place, and time. Psychiatric:         Mood and Affect: Mood normal.         Behavior: Behavior normal.         ASSESSMENT/ PLAN:    1. Essential hypertension  - stable  - atenolol (TENORMIN) 50 MG tablet; Take 1 tablet by mouth 2 times daily  Dispense: 180 tablet; Refill: 5    2. Mixed hyperlipidemia  - recommend watch diet and loss wt    3. Prediabetes  - watch diet and loss wt           - labs discussed with the patient  - will give her the fit test  - Appropriateprescription are addressed. - After visit summery provided. - Questions answered and patient verbalizes understanding.  - Call for any problem, questions, or concerns. Return in about 6 months (around 9/17/2020).  for OV and MWV

## 2020-09-17 ENCOUNTER — OFFICE VISIT (OUTPATIENT)
Dept: FAMILY MEDICINE CLINIC | Age: 60
End: 2020-09-17
Payer: MEDICARE

## 2020-09-17 ENCOUNTER — TELEPHONE (OUTPATIENT)
Dept: CARDIOLOGY CLINIC | Age: 60
End: 2020-09-17

## 2020-09-17 VITALS
HEIGHT: 67 IN | HEART RATE: 68 BPM | WEIGHT: 226.4 LBS | DIASTOLIC BLOOD PRESSURE: 85 MMHG | BODY MASS INDEX: 35.53 KG/M2 | OXYGEN SATURATION: 98 % | SYSTOLIC BLOOD PRESSURE: 160 MMHG

## 2020-09-17 VITALS
BODY MASS INDEX: 35.53 KG/M2 | HEIGHT: 67 IN | WEIGHT: 226.4 LBS | SYSTOLIC BLOOD PRESSURE: 150 MMHG | HEART RATE: 68 BPM | DIASTOLIC BLOOD PRESSURE: 82 MMHG | OXYGEN SATURATION: 98 %

## 2020-09-17 LAB
BILIRUBIN, POC: NEGATIVE
BLOOD URINE, POC: NEGATIVE
CHOLESTEROL, TOTAL: 233 MG/DL (ref 0–199)
CLARITY, POC: CLEAR
COLOR, POC: YELLOW
GLUCOSE URINE, POC: NEGATIVE
HDLC SERPL-MCNC: 68 MG/DL (ref 40–60)
KETONES, POC: NEGATIVE
LDL CHOLESTEROL CALCULATED: 150 MG/DL
LEUKOCYTE EST, POC: NORMAL
NITRITE, POC: NEGATIVE
PH, POC: 6
PROTEIN, POC: NORMAL
SPECIFIC GRAVITY, POC: >=1.03
TRIGL SERPL-MCNC: 75 MG/DL (ref 0–150)
UROBILINOGEN, POC: NORMAL
VLDLC SERPL CALC-MCNC: 15 MG/DL

## 2020-09-17 PROCEDURE — 36415 COLL VENOUS BLD VENIPUNCTURE: CPT | Performed by: FAMILY MEDICINE

## 2020-09-17 PROCEDURE — G8427 DOCREV CUR MEDS BY ELIG CLIN: HCPCS | Performed by: FAMILY MEDICINE

## 2020-09-17 PROCEDURE — G0402 INITIAL PREVENTIVE EXAM: HCPCS | Performed by: FAMILY MEDICINE

## 2020-09-17 PROCEDURE — 99214 OFFICE O/P EST MOD 30 MIN: CPT | Performed by: FAMILY MEDICINE

## 2020-09-17 PROCEDURE — 81002 URINALYSIS NONAUTO W/O SCOPE: CPT | Performed by: FAMILY MEDICINE

## 2020-09-17 PROCEDURE — 1036F TOBACCO NON-USER: CPT | Performed by: FAMILY MEDICINE

## 2020-09-17 PROCEDURE — G8417 CALC BMI ABV UP PARAM F/U: HCPCS | Performed by: FAMILY MEDICINE

## 2020-09-17 PROCEDURE — 3017F COLORECTAL CA SCREEN DOC REV: CPT | Performed by: FAMILY MEDICINE

## 2020-09-17 RX ORDER — LISINOPRIL 10 MG/1
10 TABLET ORAL DAILY
Qty: 30 TABLET | Refills: 3 | Status: SHIPPED | OUTPATIENT
Start: 2020-09-17 | End: 2020-09-18

## 2020-09-17 RX ORDER — NITROFURANTOIN 25; 75 MG/1; MG/1
100 CAPSULE ORAL 2 TIMES DAILY
Qty: 14 CAPSULE | Refills: 0 | Status: SHIPPED | OUTPATIENT
Start: 2020-09-17 | End: 2020-09-24

## 2020-09-17 ASSESSMENT — PATIENT HEALTH QUESTIONNAIRE - PHQ9
SUM OF ALL RESPONSES TO PHQ9 QUESTIONS 1 & 2: 0
2. FEELING DOWN, DEPRESSED OR HOPELESS: 0
1. LITTLE INTEREST OR PLEASURE IN DOING THINGS: 0
SUM OF ALL RESPONSES TO PHQ QUESTIONS 1-9: 0
SUM OF ALL RESPONSES TO PHQ QUESTIONS 1-9: 0

## 2020-09-17 ASSESSMENT — ENCOUNTER SYMPTOMS
VOMITING: 0
NAUSEA: 0
ORTHOPNEA: 0
CONSTIPATION: 0
ABDOMINAL PAIN: 1
SHORTNESS OF BREATH: 0
BACK PAIN: 0
COUGH: 0
DIARRHEA: 0

## 2020-09-17 ASSESSMENT — LIFESTYLE VARIABLES: HOW OFTEN DO YOU HAVE A DRINK CONTAINING ALCOHOL: 0

## 2020-09-17 NOTE — PROGRESS NOTES
Rob Moulton  1960  09/17/20    Chief Complaint   Patient presents with    6 Month Follow-Up    Hypertension    Hyperlipidemia    Other     Prediabetes and complain of urinary frequency and chest pain           Patient here for 6 months f/u regarding HTN, HLD, prediabetic, The patient is taking hypertensive medications compliantly without side effects. Denies chest pain, dyspnea, edema, or TIA's, didn't loss wt.,          Urinary Frequency    This is a new problem. Episode onset: x 2 months. The problem has been gradually worsening. The quality of the pain is described as aching. The pain is at a severity of 4/10. There has been no fever. Associated symptoms include flank pain and frequency. Pertinent negatives include no chills, discharge, hematuria, nausea, urgency or vomiting. She has tried nothing for the symptoms. There is no history of catheterization, recurrent UTIs or urinary stasis. Chest Pain    This is a chronic problem. The current episode started more than 1 year ago (worse last 2 months). The problem occurs daily. The problem has been gradually worsening. The pain is present in the substernal region. The pain is at a severity of 5/10. The quality of the pain is described as pressure. The pain radiates to the epigastrium. Associated symptoms include abdominal pain (lower abdominal pain), near-syncope and syncope (feels like she is going to pass out). Pertinent negatives include no back pain, claudication, cough, dizziness, fever, headaches, leg pain, nausea, orthopnea, palpitations, shortness of breath or vomiting. The pain is aggravated by nothing. Risk factors include lack of exercise and post-menopausal.   Her past medical history is significant for hyperlipidemia and hypertension. Her family medical history is significant for CAD. Prior diagnostic workup includes Persantine thallium (2018 was normal).        Past Medical History:   Diagnosis Date    Arthritis     Hypertension Review of Systems   Constitutional: Negative for activity change, chills, fatigue and fever. HENT: Negative for congestion. Respiratory: Negative for cough and shortness of breath. Cardiovascular: Positive for chest pain, syncope (feels like she is going to pass out) and near-syncope. Negative for palpitations, orthopnea, claudication and leg swelling. Gastrointestinal: Positive for abdominal pain (lower abdominal pain). Negative for constipation, diarrhea, nausea and vomiting. Genitourinary: Positive for flank pain and frequency. Negative for difficulty urinating, dysuria, hematuria and urgency. Musculoskeletal: Negative for back pain. Neurological: Negative for dizziness and headaches. Psychiatric/Behavioral: Negative for agitation and sleep disturbance. The patient is not nervous/anxious.         Lab Results   Component Value Date    WBC 6.6 01/28/2020    HGB 13.6 01/28/2020    HCT 40.4 01/28/2020    MCV 87.5 01/28/2020     01/28/2020     Lab Results   Component Value Date     01/28/2020    K 4.1 01/28/2020     01/28/2020    CO2 25 01/28/2020    BUN 14 01/28/2020    CREATININE 0.8 01/28/2020    GLUCOSE 93 01/28/2020    CALCIUM 9.8 01/28/2020    PROT 8.5 (H) 01/28/2020    LABALBU 4.4 01/28/2020    BILITOT 0.3 01/28/2020    ALKPHOS 83 01/28/2020    AST 18 01/28/2020    ALT 17 01/28/2020    LABGLOM >60 01/28/2020    GFRAA >60 01/28/2020    AGRATIO 1.1 01/28/2020    GLOB 4.1 01/28/2020     Lab Results   Component Value Date    CHOL 254 (H) 01/28/2020     Lab Results   Component Value Date    TRIG 68 01/28/2020     Lab Results   Component Value Date    HDL 81 (H) 01/28/2020     Lab Results   Component Value Date    LDLCALC 159 (H) 01/28/2020     Lab Results   Component Value Date    LABA1C 6.2 12/17/2019     Lab Results   Component Value Date    TSH 1.65 01/28/2020    TSHHS 1.250 12/17/2018         BP (!) 160/85   Pulse 68   Ht 5' 7\" (1.702 m)   Wt 226 lb 6.4 oz (102.7 kg) Cardiology              - All old blood work reviewed with the patient  - Appropriate prescription are addressed. - After visit summery provided. - Questions answered and patient verbalizes understanding.  - Call for any problem, questions, or concerns. Return in about 1 month (around 10/17/2020).   To check the blood pressure

## 2020-09-17 NOTE — PROGRESS NOTES
Medicare Annual Wellness Visit  Name: Janina Vela Date: 2020   MRN: [id number] Sex: Female   Age: 62 y.o. Ethnicity: Non-/Non    : 1960 Race: Berta Singer is here for Shar[place]    Screenings for behavioral, psychosocial and functional/safety risks, and cognitive dysfunction are all negative except as indicated below. These results, as well as other patient data from the [address] form, are documented in Flowsheets linked to this Encounter. No Known Allergies      Prior to Visit Medications    Medication Sig Taking? Authorizing Provider   atenolol (TENORMIN) 50 MG tablet Take 1 tablet by mouth 2 times daily Yes David Durand MD   nitrofurantoin, macrocrystal-monohydrate, (MACROBID) 100 MG capsule Take 1 capsule by mouth 2 times daily for 7 days  David Durand MD   lisinopril (PRINIVIL;ZESTRIL) 10 MG tablet Take 1 tablet by mouth daily  David Durand MD         Past Medical History:   Diagnosis Date    Arthritis     Hypertension        Past Surgical History:   Procedure Laterality Date     SECTION         History reviewed. No pertinent family history. CareTeam (Including outside providers/suppliers regularly involved in providing care):   Patient Care Team:  David Durand MD as PCP - General (Family Medicine)  aDvid Durand MD [facility] PCP [facility]    Wt Readings from Last 3 Encounters:   20 226 lb 6.4 oz (102.7 kg)   20 226 lb 6.4 oz (102.7 kg)   20 222 lb 3.2 oz (100.8 kg)     Vitals:    20 0824   BP: (!) 150/82   Site: Left Upper Arm   Position: Sitting   Cuff Size: Large Adult   Pulse: 68   SpO2: 98%   Weight: 226 lb 6.4 oz (102.7 kg)   Height: 5' 7\" (1.702 m)     Body mass index is 35.46 kg/m². Based upon direct observation of the patient, evaluation of cognition reveals recent and remote memory intact.         Patient's complete Health Risk Assessment and screening cancer screen  06/19/2022    Lipid screen  01/28/2025    Hepatitis A vaccine  Aged Out    Hepatitis B vaccine  Aged Out    Hib vaccine  Aged Out    Meningococcal (ACWY) vaccine  Aged Out    Pneumococcal 0-64 years Vaccine  Aged Out     Recommendations for [employer] Due: see orders and patient instructions/AVS.  . Recommended screening schedule for the next 5-10 years is provided to the patient in written form: see Patient Instructions/AVS.    Wenceslao Muhammad was seen today for medicare awv.     Diagnoses and all orders for this visit:    Routine general medical examination at a health care facility    Menopause  -     DEXA BONE DENSITY AXIAL SKELETON; Future

## 2020-09-17 NOTE — PATIENT INSTRUCTIONS
Personalized Preventive Plan for [place] - 9/17/2020  Medicare offers a range of preventive health benefits. Some of the tests and screenings are paid in full while other may be subject to a deductible, co-insurance, and/or copay. Some of these benefits include a comprehensive review of your medical history including lifestyle, illnesses that may run in your family, and various assessments and screenings as appropriate. After reviewing your medical record and screening and assessments performed today your provider may have ordered immunizations, labs, imaging, and/or referrals for you. A list of these orders (if applicable) as well as your Preventive Care list are included within your After Visit Summary for your review. Other Preventive Recommendations:    · A preventive eye exam performed by an eye specialist is recommended every 1-2 years to screen for glaucoma; cataracts, macular degeneration, and other eye disorders. · A preventive dental visit is recommended every 6 months. · Try to get at least 150 minutes of exercise per week or 10,000 steps per day on a pedometer . · Order or download the FREE \"Exercise & Physical Activity: Your Everyday Guide\" from [employer]. Call 4-721.284.1929 or search [employer] online. · You need 2312-2610 mg of calcium and 7066-6180 IU of vitamin D per day. It is possible to meet your calcium requirement with diet alone, but a vitamin D supplement is usually necessary to meet this goal.  · When exposed to the sun, use a sunscreen that protects against both UVA and UVB radiation with an SPF of 30 or greater. Reapply every 2 to 3 hours or after sweating, drying off with a towel, or swimming. · Always wear a seat belt when traveling in a car. Always wear a helmet when riding a bicycle or motorcycle.

## 2020-09-18 LAB
ESTIMATED AVERAGE GLUCOSE: 134.1 MG/DL
HBA1C MFR BLD: 6.3 %

## 2020-09-21 ENCOUNTER — TELEPHONE (OUTPATIENT)
Dept: CARDIOLOGY CLINIC | Age: 60
End: 2020-09-21

## 2020-09-30 ENCOUNTER — HOSPITAL ENCOUNTER (OUTPATIENT)
Dept: WOMENS IMAGING | Age: 60
Discharge: HOME | End: 2020-09-30
Payer: MEDICARE

## 2020-09-30 PROCEDURE — 77080 DXA BONE DENSITY AXIAL: CPT

## 2020-10-01 ENCOUNTER — NURSE ONLY (OUTPATIENT)
Dept: CARDIOLOGY CLINIC | Age: 60
End: 2020-10-01
Payer: MEDICARE

## 2020-10-01 ENCOUNTER — INITIAL CONSULT (OUTPATIENT)
Dept: CARDIOLOGY CLINIC | Age: 60
End: 2020-10-01
Payer: MEDICARE

## 2020-10-01 VITALS
HEART RATE: 71 BPM | DIASTOLIC BLOOD PRESSURE: 80 MMHG | BODY MASS INDEX: 38.09 KG/M2 | WEIGHT: 228.6 LBS | HEIGHT: 65 IN | SYSTOLIC BLOOD PRESSURE: 140 MMHG

## 2020-10-01 PROBLEM — E66.9 OBESITY (BMI 30-39.9): Status: ACTIVE | Noted: 2020-10-01

## 2020-10-01 PROBLEM — R06.02 SHORTNESS OF BREATH ON EXERTION: Status: ACTIVE | Noted: 2020-10-01

## 2020-10-01 PROBLEM — R00.2 PALPITATIONS: Status: ACTIVE | Noted: 2020-10-01

## 2020-10-01 PROCEDURE — 93225 XTRNL ECG REC<48 HRS REC: CPT | Performed by: INTERNAL MEDICINE

## 2020-10-01 PROCEDURE — 99204 OFFICE O/P NEW MOD 45 MIN: CPT | Performed by: INTERNAL MEDICINE

## 2020-10-01 PROCEDURE — 93000 ELECTROCARDIOGRAM COMPLETE: CPT | Performed by: INTERNAL MEDICINE

## 2020-10-01 RX ORDER — ATORVASTATIN CALCIUM 20 MG/1
20 TABLET, FILM COATED ORAL DAILY
Qty: 90 TABLET | Refills: 1 | Status: SHIPPED | OUTPATIENT
Start: 2020-10-01 | End: 2020-10-22

## 2020-10-01 RX ORDER — LOSARTAN POTASSIUM 25 MG/1
25 TABLET ORAL DAILY
Qty: 90 TABLET | Refills: 1 | Status: SHIPPED | OUTPATIENT
Start: 2020-10-01 | End: 2021-02-09 | Stop reason: SDUPTHER

## 2020-10-01 NOTE — PROGRESS NOTES
Meggan Rivera MD                                  CARDIOLOGY  NOTE        Referring Physician: Bj Pinon MD    Thank you for consultation. Chief Complaint:      Flutter sensation in the chest   Shortness of breath     HPI:     Kitty Farfan is a 62y.o. year old female with past medical history significant for hypertension, back pain presents to the clinic with a chief complaint of fluttering sensation in the chest.  According to the patient, she has very frequent fluttering sensation originating in the abdomen/epigastrium and radiating up the chest.  The frequency of the sensations is every 1-2 hours the usually last for few minutes. Patient denies any presyncope syncope. Patient denies any particular chest pain. Patient denies palpitations  She does complain of occasional shortness of breath and leg edema    Patient has been hypertensive and has been maintained on atenolol 50 mg twice a day. Recently she was started on lisinopril 10 mg by PCP however patient did not take due to dizziness. In the past patient was maintained on amlodipine but due to leg edema she stopped that. She was also on hydrochlorothiazide and she stopped that secondary to hair loss. In the office today her blood pressure is 140/80. Does mention that she checks her blood pressure at home 2-3 times a day and usually her blood pressure is in the 130s to 968D range systolic. Patient denies any further complaints    EKG office today shows normal sinus rhythm          Current Outpatient Medications   Medication Sig Dispense Refill    losartan (COZAAR) 25 MG tablet Take 1 tablet by mouth daily 90 tablet 1    atenolol (TENORMIN) 50 MG tablet Take 1 tablet by mouth 2 times daily 180 tablet 5     No current facility-administered medications for this visit. Allergies:     Patient has no known allergies.     Patient History:    Past Medical History:   Diagnosis Date    Arthritis     Hypertension      Past Surgical History: Procedure Laterality Date     SECTION       History reviewed. No pertinent family history. Social History     Tobacco Use    Smoking status: Never Smoker    Smokeless tobacco: Never Used   Substance Use Topics    Alcohol use: No        Review of Systems:     · Constitutional:  No Fever or Weight Loss   · Eyes: No Decreased Vision  · ENT: No Headaches, Hearing Loss or Vertigo  · Cardiovascular: No Chest Pain,  + Shortness of breath, No Palpitations. No Edema. Fluttering sensation in the chest  · Respiratory: No cough or wheezing . No Respiratory distress   · Gastrointestinal: No abdominal pain, appetite loss, blood in stools, constipation, diarrhea or heartburn  · Genitourinary: No dysuria, trouble voiding, or hematuria  · Musculoskeletal:  denies any new  joint aches , or pain   · Integumentary: No rash or pruritis  · Neurological: No TIA or stroke symptoms  · Psychiatric: No anxiety or depression  · Endocrine: No malaise, fatigue or temperature intolerance  · Hematologic/Lymphatic: No bleeding problems, blood clots or swollen lymph nodes  · Allergic/Immunologic: No nasal congestion or hives        Objective:      Physical Exam:    BP (!) 140/80   Pulse 71   Ht 5' 5\" (1.651 m)   Wt 228 lb 9.6 oz (103.7 kg)   BMI 38.04 kg/m²   Wt Readings from Last 3 Encounters:   10/01/20 228 lb 9.6 oz (103.7 kg)   20 226 lb 6.4 oz (102.7 kg)   20 226 lb 6.4 oz (102.7 kg)     Body mass index is 38.04 kg/m². Vitals:    10/01/20 0830   BP: (!) 140/80   Pulse:         General Appearance and Constitutional: Conversant, Well developed, Well nourished, No acute distress, Non-toxic appearance. HEENT:  Normocephalic, Atraumatic, Bilateral external ears normal, Oropharynx moist, No oral exudates,   Nose normal.   Neck- Normal range of motion, No tenderness, Supple  Eyes:  Conjunctiva normal, No discharge. Respiratory:  Normal breath sounds, No respiratory distress, No wheezing, No Rales, No Ronchi.   No echocardiogram  3. Hyperlipidemia: We will start low-dose of statin therapy  4. Essential hypertension: Stop lisinopril, start losartan 25 mg daily continue with atenolol at current dose. Continue to log blood pressure and bring upon next appointment  5. Obesity BMI 38.04: Diet and Exercise: DASH Diet / Counseled about weight loss and exercise. Return in about 1 month (around 11/1/2020). Counseled extensively and medication compliance urged. We discussed that for the  prevention of ASCVD our  goal is aggressive risk modification. Patient is encouraged to exercise even a brisk walk for 30 minutes  at least 3 to 4 times a week   Various goals were discussed and questions answered. Continue current medications. Appropriate prescriptions are addressed and refills ordered. Questions answered and patient verbalizes understanding. Call for any problems, questions, or concerns.

## 2020-10-01 NOTE — PROGRESS NOTES
48 hour holter monitor applied [email or web address] for palpitations Serial # [id number] . Instructed patient on monitor and proper use. Instructed on diary. When to remove and bring it back. Must leave the holter monitor on  without removing for the duration of time ordered. Answered all questions the patient had. Instructed patient to call [facility] at [phone] with any questions or concerns with the monitor.

## 2020-10-01 NOTE — PATIENT INSTRUCTIONS
Patient Education        DASH Diet: Care Instructions  Your Care Instructions     The DASH diet is an eating plan that can help lower your blood pressure. DASH stands for Dietary Approaches to Stop Hypertension. Hypertension is high blood pressure. The DASH diet focuses on eating foods that are high in calcium, potassium, and magnesium. These nutrients can lower blood pressure. The foods that are highest in these nutrients are fruits, vegetables, low-fat dairy products, nuts, seeds, and legumes. But taking calcium, potassium, and magnesium supplements instead of eating foods that are high in those nutrients does not have the same effect. The DASH diet also includes whole grains, fish, and poultry. The DASH diet is one of several lifestyle changes your doctor may recommend to lower your high blood pressure. Your doctor may also want you to decrease the amount of sodium in your diet. Lowering sodium while following the DASH diet can lower blood pressure even further than just the DASH diet alone. Follow-up care is a key part of your treatment and safety. Be sure to make and go to all appointments, and call your doctor if you are having problems. It's also a good idea to know your test results and keep a list of the medicines you take. How can you care for yourself at home? Following the DASH diet  · Eat 4 to 5 servings of fruit each day. A serving is 1 medium-sized piece of fruit, ½ cup chopped or canned fruit, 1/4 cup dried fruit, or 4 ounces (½ cup) of fruit juice. Choose fruit more often than fruit juice. · Eat 4 to 5 servings of vegetables each day. A serving is 1 cup of lettuce or raw leafy vegetables, ½ cup of chopped or cooked vegetables, or 4 ounces (½ cup) of vegetable juice. Choose vegetables more often than vegetable juice. · Get 2 to 3 servings of low-fat and fat-free dairy each day. A serving is 8 ounces of milk, 1 cup of yogurt, or 1 ½ ounces of cheese. · Eat 6 to 8 servings of grains each day.  A serving is 1 slice of bread, 1 ounce of dry cereal, or ½ cup of cooked rice, pasta, or cooked cereal. Try to choose whole-grain products as much as possible. · Limit lean meat, poultry, and fish to 2 servings each day. A serving is 3 ounces, about the size of a deck of cards. · Eat 4 to 5 servings of nuts, seeds, and legumes (cooked dried beans, lentils, and split peas) each week. A serving is 1/3 cup of nuts, 2 tablespoons of seeds, or ½ cup of cooked beans or peas. · Limit fats and oils to 2 to 3 servings each day. A serving is 1 teaspoon of vegetable oil or 2 tablespoons of salad dressing. · Limit sweets and added sugars to 5 servings or less a week. A serving is 1 tablespoon jelly or jam, ½ cup sorbet, or 1 cup of lemonade. · Eat less than 2,300 milligrams (mg) of sodium a day. If you limit your sodium to 1,500 mg a day, you can lower your blood pressure even more. Tips for success  · Start small. Do not try to make dramatic changes to your diet all at once. You might feel that you are missing out on your favorite foods and then be more likely to not follow the plan. Make small changes, and stick with them. Once those changes become habit, add a few more changes. · Try some of the following:  ? Make it a goal to eat a fruit or vegetable at every meal and at snacks. This will make it easy to get the recommended amount of fruits and vegetables each day. ? Try yogurt topped with fruit and nuts for a snack or healthy dessert. ? Add lettuce, tomato, cucumber, and onion to sandwiches. ? Combine a ready-made pizza crust with low-fat mozzarella cheese and lots of vegetable toppings. Try using tomatoes, squash, spinach, broccoli, carrots, cauliflower, and onions. ? Have a variety of cut-up vegetables with a low-fat dip as an appetizer instead of chips and dip. ? Sprinkle sunflower seeds or chopped almonds over salads. Or try adding chopped walnuts or almonds to cooked vegetables.   ? Try some vegetarian meals using beans and peas. Add garbanzo or kidney beans to salads. Make burritos and tacos with mashed elmore beans or black beans. Where can you learn more? Go to https://[employer]. org and sign in to your [employer] account. Enter D180 in the [employer] box to learn more about \"DASH Diet: Care Instructions. \"     If you do not have an account, please click on the \"Sign Up Now\" link. Current as of: December 16, 2019               Content Version: 12.5  © 6156-6910 Healthwise, Incorporated. Care instructions adapted under license by [facility] ([facility]). If you have questions about a medical condition or this instruction, always ask your healthcare professional. [address] any warranty or liability for your use of this information.

## 2020-10-01 NOTE — LETTER
Kip Hitt Dr. Annita Cogan  1960  [id number]      Patient referred here from Dr. Jeannie Graham for chest pain. Patient states she has a flutter feeling lasting a few seconds, she said it has been happening daily and she will just take a deep breath. Patient stated it starts under center under her breast area and makes its way up to her throat. She experiences shortness of breath at rest but not so much with exertion. She denies any dizziness, swelling, and chest pain.         Check Venous \"LEG PROBLEM Questionnaire\"    Do you have a surgery or procedure scheduled in the near future - No    Ask patient if they want to sign up for [employer] if they are not already signed up    Check to see if we have an E-MAIL on file for the patient    Check medication list thoroughly!!!  BE SURE TO ASK PATIENT IF THEY NEED MEDICATION REFILLS

## 2020-10-12 PROCEDURE — 93227 XTRNL ECG REC<48 HR R&I: CPT | Performed by: INTERNAL MEDICINE

## 2020-10-14 ENCOUNTER — PROCEDURE VISIT (OUTPATIENT)
Dept: CARDIOLOGY CLINIC | Age: 60
End: 2020-10-14
Payer: MEDICARE

## 2020-10-14 LAB
LV EF: 58 %
LVEF MODALITY: NORMAL

## 2020-10-14 PROCEDURE — 93306 TTE W/DOPPLER COMPLETE: CPT | Performed by: INTERNAL MEDICINE

## 2020-10-19 ENCOUNTER — TELEPHONE (OUTPATIENT)
Dept: CARDIOLOGY CLINIC | Age: 60
End: 2020-10-19

## 2020-10-22 ENCOUNTER — OFFICE VISIT (OUTPATIENT)
Dept: FAMILY MEDICINE CLINIC | Age: 60
End: 2020-10-22
Payer: MEDICARE

## 2020-10-22 VITALS
BODY MASS INDEX: 38.17 KG/M2 | DIASTOLIC BLOOD PRESSURE: 79 MMHG | WEIGHT: 229.4 LBS | SYSTOLIC BLOOD PRESSURE: 125 MMHG | OXYGEN SATURATION: 100 % | HEART RATE: 62 BPM

## 2020-10-22 LAB
BILIRUBIN, POC: NEGATIVE
BLOOD URINE, POC: NEGATIVE
CLARITY, POC: CLEAR
COLOR, POC: YELLOW
GLUCOSE URINE, POC: NEGATIVE
KETONES, POC: NEGATIVE
LEUKOCYTE EST, POC: NORMAL
NITRITE, POC: NEGATIVE
PH, POC: 6.5
PROTEIN, POC: NEGATIVE
SPECIFIC GRAVITY, POC: 1.02
UROBILINOGEN, POC: NORMAL

## 2020-10-22 PROCEDURE — G8417 CALC BMI ABV UP PARAM F/U: HCPCS | Performed by: FAMILY MEDICINE

## 2020-10-22 PROCEDURE — 81002 URINALYSIS NONAUTO W/O SCOPE: CPT | Performed by: FAMILY MEDICINE

## 2020-10-22 PROCEDURE — 1036F TOBACCO NON-USER: CPT | Performed by: FAMILY MEDICINE

## 2020-10-22 PROCEDURE — G8484 FLU IMMUNIZE NO ADMIN: HCPCS | Performed by: FAMILY MEDICINE

## 2020-10-22 PROCEDURE — 99213 OFFICE O/P EST LOW 20 MIN: CPT | Performed by: FAMILY MEDICINE

## 2020-10-22 PROCEDURE — G8427 DOCREV CUR MEDS BY ELIG CLIN: HCPCS | Performed by: FAMILY MEDICINE

## 2020-10-22 PROCEDURE — 3017F COLORECTAL CA SCREEN DOC REV: CPT | Performed by: FAMILY MEDICINE

## 2020-10-22 RX ORDER — CIPROFLOXACIN 500 MG/1
500 TABLET, FILM COATED ORAL 2 TIMES DAILY
Qty: 14 TABLET | Refills: 0 | Status: SHIPPED | OUTPATIENT
Start: 2020-10-22 | End: 2020-10-29

## 2020-10-22 ASSESSMENT — ENCOUNTER SYMPTOMS
CONSTIPATION: 0
ABDOMINAL DISTENTION: 0
VOMITING: 0
COUGH: 0
DIARRHEA: 0
SHORTNESS OF BREATH: 0
ABDOMINAL PAIN: 1
NAUSEA: 0
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
## 2020-12-24 ENCOUNTER — OFFICE VISIT (OUTPATIENT)
Dept: CARDIOLOGY CLINIC | Age: 60
End: 2020-12-24
Payer: MEDICARE

## 2020-12-24 VITALS
DIASTOLIC BLOOD PRESSURE: 76 MMHG | HEIGHT: 65 IN | WEIGHT: 229.2 LBS | HEART RATE: 72 BPM | BODY MASS INDEX: 38.19 KG/M2 | SYSTOLIC BLOOD PRESSURE: 132 MMHG

## 2020-12-24 PROCEDURE — 99213 OFFICE O/P EST LOW 20 MIN: CPT | Performed by: INTERNAL MEDICINE

## 2020-12-24 PROCEDURE — 1036F TOBACCO NON-USER: CPT | Performed by: INTERNAL MEDICINE

## 2020-12-24 PROCEDURE — 3017F COLORECTAL CA SCREEN DOC REV: CPT | Performed by: INTERNAL MEDICINE

## 2020-12-24 PROCEDURE — G8427 DOCREV CUR MEDS BY ELIG CLIN: HCPCS | Performed by: INTERNAL MEDICINE

## 2020-12-24 PROCEDURE — G8484 FLU IMMUNIZE NO ADMIN: HCPCS | Performed by: INTERNAL MEDICINE

## 2020-12-24 PROCEDURE — G8417 CALC BMI ABV UP PARAM F/U: HCPCS | Performed by: INTERNAL MEDICINE

## 2020-12-24 RX ORDER — ATORVASTATIN CALCIUM 20 MG/1
20 TABLET, FILM COATED ORAL DAILY
Qty: 90 TABLET | Refills: 1 | Status: SHIPPED | OUTPATIENT
Start: 2020-12-24 | End: 2021-05-11 | Stop reason: SDUPTHER

## 2020-12-24 RX ORDER — METOPROLOL TARTRATE 50 MG/1
50 TABLET, FILM COATED ORAL 2 TIMES DAILY
Qty: 60 TABLET | Refills: 3 | Status: SHIPPED | OUTPATIENT
Start: 2020-12-24 | End: 2021-02-09 | Stop reason: ALTCHOICE

## 2020-12-24 RX ORDER — M-VIT,TX,IRON,MINS/CALC/FOLIC 27MG-0.4MG
1 TABLET ORAL DAILY
COMMUNITY
End: 2022-04-28

## 2020-12-24 NOTE — PATIENT INSTRUCTIONS
I have called and spoken with Camila, she is scheduled per  Her preference for sclerotherapy treatmen with Dr. Martínez and a letter was sent with appt details.  WYATT Jose RN, BSN  Interventional Radiology Care Coordinator   Phone:  [phone]     Please be informed that if you contact our office outside of normal business hours the physician on call cannot help with any scheduling or rescheduling issues, procedure instruction questions or any type of medication issue. We advise you for any urgent/emergency that you go to the nearest emergency room! PLEASE CALL OUR OFFICE DURING NORMAL BUSINESS HOURS    Monday - Friday   8 am to 5 pm    [facility] [address]: [phone]    [place]:  [phone]    Please hold on to these instructions the  will call you within 1-9 business days when we receive authorization from your insurance. Nuclear Stress Test    WHAT TO EXPECT:   ? You will need to confirm the test or it could be cancelled. ? This test will take approximately 2 hours: 1 hour in the AM &    1 hour in the PM. You will be given a time by the Technologist after the first part is completed to come back. ? You will be given a medication, through an IV in the hand, this will safely simulate exercise. This IV is also needed to inject the radioactive isotope unless you are able toe walk the treadmill. ? You will receive an injection in the AM & PM before the pictures. ? Using a special camera, you will have one set of pictures of your heart taken in the AM and a set of pictures in the PM.     PREPARATION FOR TEST:  ? Eat a light breakfast such as water or juice and toast.  ? If you are DIABETIC: Eat a normal breakfast with NO CAFFEINE and take your insulin as normal.   ? AVOID ALL FOODS & DRINKS containing CAFFEINE 12 HOURS PRIOR TO THE TEST: Including coffee, Tea, Elton and other soft drinks even those labeled  caffeine free or decaffeinated.  ? HOLD THESE MEDICATIONS Persantine & Theophylline (Theodur)  24 hours prior & bring your inhaler with you.

## 2020-12-24 NOTE — PROGRESS NOTES
Jonas Hernández MD                                  CARDIOLOGY  NOTE       Chief Complaint:    Follow up palpitations     + Flutter sensation in the chest   Shortness of breath - improved  + chest pressure radiating to neck       ECHOCARDIOGRAM 10/14/2020     Summary   Left ventricular function is normal, EF is estimated at 55-60%. Mild left ventricular hypertrophy. E/A reversal; indeterminate diastolic function. Mild tricuspid regurgitation. No significant valvular disease noted. Left ventricular function is normal, EF is estimated at 55-60%. Mild left ventricular hypertrophy. E/A reversal; indeterminate diastolic function. No evidence of pericardial effusion. HOLTER  MONITOR 10/1/2020     Patient Name: Kiera Washington   Medical Record Number: [id number]  Date: 10/12/2020     62 y.o. 1960     INDICATIONS:  Palpitations     FINDINGS:  The patient had predominantly sinus rhythm. Heart rate varied from 48 bpm to 99 bpm.  The patients average heart rate was 67 bpm.    The patient had a holter monitor tracing done for 48 hour(s)  The patient had 3195 ventricular ectopic activity. All single beats, no runs noted. 1.75 % total burden. The patient had 48 supraventricular ectopy.    The longest R/R interval was 1.42 seconds. No other major arrhythmia was noted.     Events of fluttering in chest correspond to Vent. Ectopies and Normal Sinus Rhythm      CONCLUSION:    Normal Sinus Rhythm   Rare Ventricular Ectopies with burden of 1.75%     RECOMMENDATIONS:      Medical Management.         Recent Cardiac HPI:     Natacha Patel is a 62y.o. year old female with past medical history significant for hypertension, back pain presents to the clinic with a chief complaint of fluttering sensation in the chest.  According to the patient, she has very frequent fluttering sensation originating in the abdomen/epigastrium and radiating up the chest.  The frequency of the sensations is every 1-2 hours the usually last for few minutes. Patient denies any presyncope syncope. Patient denies any particular chest pain. Patient denies palpitations  She does complain of occasional shortness of breath and leg edema    Patient has been hypertensive and has been maintained on atenolol 50 mg twice a day. Recently she was started on lisinopril 10 mg by PCP however patient did not take due to dizziness. In the past patient was maintained on amlodipine but due to leg edema she stopped that. She was also on hydrochlorothiazide and she stopped that secondary to hair loss. In the office today her blood pressure is 140/80. Does mention that she checks her blood pressure at home 2-3 times a day and usually her blood pressure is in the 130s to 349F range systolic. Patient denies any further complaints    EKG office today shows normal sinus rhythm          Current Outpatient Medications   Medication Sig Dispense Refill    Multiple Vitamins-Minerals (THERAPEUTIC MULTIVITAMIN-MINERALS) tablet Take 1 tablet by mouth daily      losartan (COZAAR) 25 MG tablet Take 1 tablet by mouth daily 90 tablet 1    atenolol (TENORMIN) 50 MG tablet Take 1 tablet by mouth 2 times daily 180 tablet 5     No current facility-administered medications for this visit. Allergies:     Naproxen    Patient History:    Past Medical History:   Diagnosis Date    Arthritis     H/O echocardiogram 10/14/2020    EF 55-60%,  No significant valvular disease noted.  Hypertension      Past Surgical History:   Procedure Laterality Date     SECTION       History reviewed. No pertinent family history.   Social History     Tobacco Use    Smoking status: Never Smoker    Smokeless tobacco: Never Used   Substance Use Topics    Alcohol use: No        Review of Systems:     · Constitutional:  No Fever or Weight Loss   · Eyes: No Decreased Vision  · ENT: No Headaches, Hearing Loss or Vertigo  · Cardiovascular: No Chest Pain,  + Shortness of breath, No Palpitations. No Edema. Fluttering sensation in the chest  · Respiratory: No cough or wheezing . No Respiratory distress   · Gastrointestinal: No abdominal pain, appetite loss, blood in stools, constipation, diarrhea or heartburn  · Genitourinary: No dysuria, trouble voiding, or hematuria  · Musculoskeletal:  denies any new  joint aches , or pain   · Integumentary: No rash or pruritis  · Neurological: No TIA or stroke symptoms  · Psychiatric: No anxiety or depression  · Endocrine: No malaise, fatigue or temperature intolerance  · Hematologic/Lymphatic: No bleeding problems, blood clots or swollen lymph nodes  · Allergic/Immunologic: No nasal congestion or hives        Objective:      Physical Exam:    BP (!) 144/88 (Site: Left Upper Arm, Position: Sitting, Cuff Size: Large Adult)   Pulse 72   Ht 5' 5\" (1.651 m)   Wt 229 lb 3.2 oz (104 kg)   BMI 38.14 kg/m²   Wt Readings from Last 3 Encounters:   12/24/20 229 lb 3.2 oz (104 kg)   10/22/20 229 lb 6.4 oz (104.1 kg)   10/01/20 228 lb 9.6 oz (103.7 kg)     Body mass index is 38.14 kg/m². Vitals:    12/24/20 0816   BP: (!) 144/88   Pulse: 72        General Appearance and Constitutional: Conversant, Well developed, Well nourished, No acute distress, Non-toxic appearance. HEENT:  Normocephalic, Atraumatic, Bilateral external ears normal, Oropharynx moist, No oral exudates,   Nose normal.   Neck- Normal range of motion, No tenderness, Supple  Eyes:  Conjunctiva normal, No discharge. Respiratory:  Normal breath sounds, No respiratory distress, No wheezing, No Rales, No Ronchi. No chest tenderness. Cardiovascular: S1-S2, no added heart sounds, No Mumurs appreciated. No gallops, rubs. No Pedal Edema   GI:  Bowel sounds normal, Soft, No tenderness,  :  No costovertebral angle tenderness   Musculoskeletal:  No gross deformities.  Back- No tenderness  Integument:  Well hydrated, no rash   Lymphatic:  No lymphadenopathy noted   Neurologic:  Alert & oriented x 3, Normal motor function, normal sensory function, no focal deficits noted   Psychiatric:  Speech and behavior appropriate       Medical decision making and Data review:    DATA:    Lab Results   Component Value Date    TROPONINT <0.010 12/17/2018     BNP:  No results found for: PROBNP  PT/INR:  No results found for: PTINR  Lab Results   Component Value Date    LABA1C 6.3 09/17/2020    LABA1C 6.2 12/17/2019     Lab Results   Component Value Date    CHOL 233 (H) 09/17/2020    TRIG 75 09/17/2020    HDL 68 (H) 09/17/2020    LDLCALC 150 (H) 09/17/2020     Lab Results   Component Value Date    WBC 6.6 01/28/2020    HGB 13.6 01/28/2020    HCT 40.4 01/28/2020    MCV 87.5 01/28/2020     01/28/2020     TSH:   Lab Results   Component Value Date    TSH 1.65 01/28/2020     Lab Results   Component Value Date    AST 18 01/28/2020    ALT 17 01/28/2020    BILITOT 0.3 01/28/2020    ALKPHOS 83 01/28/2020         All labs, medications and tests reviewed by myself including data and history from outside source , patient and available family . 1. Essential hypertension    2. Shortness of breath on exertion    3. Dizziness    4. Obesity (BMI 30-39.9)    5. Palpitations         Impression and Plan:      1. Palpitations / PVCs:  EKG shows normal sinus rhythm. 48 hours Holter monitor shows rare PVCs accounting for 1.75% total QRS. Stop Atenolol. Start Metoprolol 25 mg BID   2. Chest pressure radiating to the neck: In setting of frequent PVCs. Will obtain Stress MPI - Last stress test in 2018 WNL. 3. Shortness of breath: Patient has mild dyspnea on exertion - Echocardiogram is within normal limits. Will obtain Stress MPI   4. Hyperlipidemia: We will start low-dose of statin therapy  5. Essential hypertension: On losartan 25 mg daily / Stop Atenolol 50 BID - start Metoprolol as above. Continue to log blood pressure and bring upon next appointment  6.  Obesity BMI 38.17: Diet and Exercise: DASH Diet / Counseled about weight loss and

## 2020-12-24 NOTE — LETTER
Harsh Barrow  1960  [id number]    Have you had any Chest Pain that is not new? - No      Have you had any Shortness of Breath - No       Have you had any dizziness - No     Have you had any palpitations that are not new? - Yes  If Yes DO EKG - Do you feel your heart fluttering  How long does it last - .2  minutes     Is the patient on any of the following medications - NONE  If Yes DO EKG - Needs done every 6 months    Do you have any edema - swelling in NONE    If Yes - CHECK TO SEE IF THE EDEMA IS PITTING    Do you have a surgery or procedure scheduled in the near future - No  If Yes- DO EKG      ? Ask patient [facility] if [facility] are not already [facility]    ? Check to see if we have an E-MAIL on file for the patient    ? Check medication list thoroughly!!! AND RECONCILE OUTSIDE MEDICATIONS  If dose has changed change the entire order not just the MG  BE SURE TO ASK PATIENT IF THEY NEED MEDICATION REFILLS    ?  At check out add to every patient's \"wrap up\" the following dot phrase AFTERHOURSEDUCATION and ensure we explain this to our patients

## 2021-01-13 ENCOUNTER — PROCEDURE VISIT (OUTPATIENT)
Dept: CARDIOLOGY CLINIC | Age: 61
End: 2021-01-13
Payer: MEDICARE

## 2021-01-13 DIAGNOSIS — I10 ESSENTIAL HYPERTENSION: ICD-10-CM

## 2021-01-13 DIAGNOSIS — R07.89 OTHER CHEST PAIN: Primary | ICD-10-CM

## 2021-01-13 DIAGNOSIS — R00.2 PALPITATIONS: ICD-10-CM

## 2021-01-13 LAB
LV EF: 77 %
LVEF MODALITY: NORMAL

## 2021-01-13 PROCEDURE — 93016 CV STRESS TEST SUPVJ ONLY: CPT | Performed by: INTERNAL MEDICINE

## 2021-01-13 PROCEDURE — A9500 TC99M SESTAMIBI: HCPCS | Performed by: INTERNAL MEDICINE

## 2021-01-13 PROCEDURE — 78452 HT MUSCLE IMAGE SPECT MULT: CPT | Performed by: INTERNAL MEDICINE

## 2021-01-13 PROCEDURE — 93017 CV STRESS TEST TRACING ONLY: CPT | Performed by: INTERNAL MEDICINE

## 2021-01-13 PROCEDURE — 93018 CV STRESS TEST I&R ONLY: CPT | Performed by: INTERNAL MEDICINE

## 2021-02-05 ENCOUNTER — OFFICE VISIT (OUTPATIENT)
Dept: FAMILY MEDICINE CLINIC | Age: 61
End: 2021-02-05
Payer: MEDICARE

## 2021-02-05 VITALS
DIASTOLIC BLOOD PRESSURE: 80 MMHG | SYSTOLIC BLOOD PRESSURE: 140 MMHG | HEART RATE: 65 BPM | WEIGHT: 226 LBS | BODY MASS INDEX: 37.61 KG/M2 | OXYGEN SATURATION: 98 %

## 2021-02-05 DIAGNOSIS — R73.03 PREDIABETES: ICD-10-CM

## 2021-02-05 DIAGNOSIS — N39.0 RECURRENT UTI: ICD-10-CM

## 2021-02-05 DIAGNOSIS — R30.0 DYSURIA: ICD-10-CM

## 2021-02-05 DIAGNOSIS — H92.01 RIGHT EAR PAIN: ICD-10-CM

## 2021-02-05 DIAGNOSIS — I10 ESSENTIAL HYPERTENSION: Primary | ICD-10-CM

## 2021-02-05 DIAGNOSIS — E78.2 MIXED HYPERLIPIDEMIA: ICD-10-CM

## 2021-02-05 LAB
BILIRUBIN, POC: NEGATIVE
BLOOD URINE, POC: NORMAL
CLARITY, POC: CLEAR
COLOR, POC: YELLOW
GLUCOSE URINE, POC: NEGATIVE
KETONES, POC: NORMAL
LEUKOCYTE EST, POC: NEGATIVE
NITRITE, POC: NEGATIVE
PH, POC: 6.5
PROTEIN, POC: NORMAL
SPECIFIC GRAVITY, POC: 1.02
UROBILINOGEN, POC: NORMAL

## 2021-02-05 PROCEDURE — G8484 FLU IMMUNIZE NO ADMIN: HCPCS | Performed by: FAMILY MEDICINE

## 2021-02-05 PROCEDURE — 3017F COLORECTAL CA SCREEN DOC REV: CPT | Performed by: FAMILY MEDICINE

## 2021-02-05 PROCEDURE — G8417 CALC BMI ABV UP PARAM F/U: HCPCS | Performed by: FAMILY MEDICINE

## 2021-02-05 PROCEDURE — 81002 URINALYSIS NONAUTO W/O SCOPE: CPT | Performed by: FAMILY MEDICINE

## 2021-02-05 PROCEDURE — 1036F TOBACCO NON-USER: CPT | Performed by: FAMILY MEDICINE

## 2021-02-05 PROCEDURE — 99214 OFFICE O/P EST MOD 30 MIN: CPT | Performed by: FAMILY MEDICINE

## 2021-02-05 PROCEDURE — G8427 DOCREV CUR MEDS BY ELIG CLIN: HCPCS | Performed by: FAMILY MEDICINE

## 2021-02-05 RX ORDER — CIPROFLOXACIN 500 MG/1
500 TABLET, FILM COATED ORAL 2 TIMES DAILY
Qty: 20 TABLET | Refills: 0 | Status: SHIPPED | OUTPATIENT
Start: 2021-02-05 | End: 2021-02-15

## 2021-02-05 ASSESSMENT — ENCOUNTER SYMPTOMS
ABDOMINAL PAIN: 1
SHORTNESS OF BREATH: 0
DIARRHEA: 0
WHEEZING: 0
NAUSEA: 0
SORE THROAT: 0
COUGH: 0
SINUS PRESSURE: 0
TROUBLE SWALLOWING: 0
SINUS PAIN: 0
VOMITING: 0
RHINORRHEA: 0

## 2021-02-05 ASSESSMENT — PATIENT HEALTH QUESTIONNAIRE - PHQ9
SUM OF ALL RESPONSES TO PHQ9 QUESTIONS 1 & 2: 0
SUM OF ALL RESPONSES TO PHQ QUESTIONS 1-9: 0

## 2021-02-09 ENCOUNTER — HOSPITAL ENCOUNTER (OUTPATIENT)
Dept: GENERAL RADIOLOGY | Age: 61
Discharge: HOME | End: 2021-02-09
Payer: MEDICARE

## 2021-02-09 ENCOUNTER — NURSE ONLY (OUTPATIENT)
Dept: CARDIOLOGY CLINIC | Age: 61
End: 2021-02-09

## 2021-02-09 ENCOUNTER — OFFICE VISIT (OUTPATIENT)
Dept: CARDIOLOGY CLINIC | Age: 61
End: 2021-02-09
Payer: MEDICARE

## 2021-02-09 ENCOUNTER — HOSPITAL ENCOUNTER (OUTPATIENT)
Age: 61
Discharge: HOME | End: 2021-02-09
Payer: MEDICARE

## 2021-02-09 ENCOUNTER — HOSPITAL ENCOUNTER (OUTPATIENT)
Age: 61
Setting detail: SPECIMEN
Discharge: HOME | End: 2021-02-09
Payer: MEDICARE

## 2021-02-09 VITALS
WEIGHT: 227.2 LBS | HEART RATE: 68 BPM | HEIGHT: 66 IN | SYSTOLIC BLOOD PRESSURE: 136 MMHG | DIASTOLIC BLOOD PRESSURE: 86 MMHG | BODY MASS INDEX: 36.52 KG/M2

## 2021-02-09 DIAGNOSIS — Z01.810 PRE-OPERATIVE CARDIOVASCULAR EXAMINATION: ICD-10-CM

## 2021-02-09 DIAGNOSIS — I10 ESSENTIAL HYPERTENSION: ICD-10-CM

## 2021-02-09 DIAGNOSIS — E66.9 OBESITY (BMI 30-39.9): ICD-10-CM

## 2021-02-09 DIAGNOSIS — Z01.810 PRE-OPERATIVE CARDIOVASCULAR EXAMINATION: Primary | ICD-10-CM

## 2021-02-09 DIAGNOSIS — R07.9 CHEST PAIN, UNSPECIFIED TYPE: ICD-10-CM

## 2021-02-09 DIAGNOSIS — R00.2 PALPITATIONS: ICD-10-CM

## 2021-02-09 DIAGNOSIS — E78.2 MIXED HYPERLIPIDEMIA: ICD-10-CM

## 2021-02-09 DIAGNOSIS — R94.39 ABNORMAL NUCLEAR STRESS TEST: ICD-10-CM

## 2021-02-09 DIAGNOSIS — R06.02 SHORTNESS OF BREATH ON EXERTION: ICD-10-CM

## 2021-02-09 LAB
ABO/RH: NORMAL
ANION GAP SERPL CALCULATED.3IONS-SCNC: 10 MMOL/L (ref 4–16)
ANTIBODY SCREEN: NEGATIVE
BASOPHILS ABSOLUTE: 0.1 K/CU MM
BASOPHILS RELATIVE PERCENT: 1 % (ref 0–1)
BUN BLDV-MCNC: 13 MG/DL (ref 6–23)
CALCIUM SERPL-MCNC: 9.5 MG/DL (ref 8.3–10.6)
CHLORIDE BLD-SCNC: 100 MMOL/L (ref 99–110)
CO2: 25 MMOL/L (ref 21–32)
COMMENT: NORMAL
CREAT SERPL-MCNC: 0.8 MG/DL (ref 0.6–1.1)
DIFFERENTIAL TYPE: ABNORMAL
EOSINOPHILS ABSOLUTE: 0.1 K/CU MM
EOSINOPHILS RELATIVE PERCENT: 1.9 % (ref 0–3)
GFR AFRICAN AMERICAN: >60 ML/MIN/1.73M2
GFR NON-AFRICAN AMERICAN: >60 ML/MIN/1.73M2
GLUCOSE BLD-MCNC: 84 MG/DL (ref 70–99)
HCT VFR BLD CALC: 42.6 % (ref 37–47)
HEMOGLOBIN: 13.2 GM/DL (ref 12.5–16)
IMMATURE NEUTROPHIL %: 0.3 % (ref 0–0.43)
LYMPHOCYTES ABSOLUTE: 1.4 K/CU MM
LYMPHOCYTES RELATIVE PERCENT: 22.1 % (ref 24–44)
MCH RBC QN AUTO: 28.1 PG (ref 27–31)
MCHC RBC AUTO-ENTMCNC: 31 % (ref 32–36)
MCV RBC AUTO: 90.8 FL (ref 78–100)
MONOCYTES ABSOLUTE: 0.5 K/CU MM
MONOCYTES RELATIVE PERCENT: 7.6 % (ref 0–4)
NUCLEATED RBC %: 0 %
PDW BLD-RTO: 13.9 % (ref 11.7–14.9)
PLATELET # BLD: 338 K/CU MM (ref 140–440)
PMV BLD AUTO: 9.7 FL (ref 7.5–11.1)
POTASSIUM SERPL-SCNC: 4.1 MMOL/L (ref 3.5–5.1)
RBC # BLD: 4.69 M/CU MM (ref 4.2–5.4)
SEGMENTED NEUTROPHILS ABSOLUTE COUNT: 4.2 K/CU MM
SEGMENTED NEUTROPHILS RELATIVE PERCENT: 67.1 % (ref 36–66)
SODIUM BLD-SCNC: 135 MMOL/L (ref 135–145)
TOTAL IMMATURE NEUTOROPHIL: 0.02 K/CU MM
TOTAL NUCLEATED RBC: 0 K/CU MM
WBC # BLD: 6.3 K/CU MM (ref 4–10.5)

## 2021-02-09 PROCEDURE — U0002 COVID-19 LAB TEST NON-CDC: HCPCS

## 2021-02-09 PROCEDURE — 71046 X-RAY EXAM CHEST 2 VIEWS: CPT

## 2021-02-09 PROCEDURE — G8417 CALC BMI ABV UP PARAM F/U: HCPCS | Performed by: INTERNAL MEDICINE

## 2021-02-09 PROCEDURE — 86901 BLOOD TYPING SEROLOGIC RH(D): CPT

## 2021-02-09 PROCEDURE — 3017F COLORECTAL CA SCREEN DOC REV: CPT | Performed by: INTERNAL MEDICINE

## 2021-02-09 PROCEDURE — 86900 BLOOD TYPING SEROLOGIC ABO: CPT

## 2021-02-09 PROCEDURE — G8484 FLU IMMUNIZE NO ADMIN: HCPCS | Performed by: INTERNAL MEDICINE

## 2021-02-09 PROCEDURE — 80048 BASIC METABOLIC PNL TOTAL CA: CPT

## 2021-02-09 PROCEDURE — G8427 DOCREV CUR MEDS BY ELIG CLIN: HCPCS | Performed by: INTERNAL MEDICINE

## 2021-02-09 PROCEDURE — 99213 OFFICE O/P EST LOW 20 MIN: CPT | Performed by: INTERNAL MEDICINE

## 2021-02-09 PROCEDURE — 1036F TOBACCO NON-USER: CPT | Performed by: INTERNAL MEDICINE

## 2021-02-09 PROCEDURE — 86850 RBC ANTIBODY SCREEN: CPT

## 2021-02-09 PROCEDURE — 85025 COMPLETE CBC W/AUTO DIFF WBC: CPT

## 2021-02-09 PROCEDURE — 36415 COLL VENOUS BLD VENIPUNCTURE: CPT

## 2021-02-09 RX ORDER — ASPIRIN 81 MG/1
81 TABLET, CHEWABLE ORAL DAILY
COMMUNITY

## 2021-02-09 RX ORDER — MULTIVIT-MIN/IRON/FOLIC ACID/K 18-600-40
CAPSULE ORAL
COMMUNITY
End: 2022-04-28

## 2021-02-09 RX ORDER — LOSARTAN POTASSIUM 50 MG/1
50 TABLET ORAL DAILY
Qty: 30 TABLET | Refills: 2 | Status: SHIPPED | OUTPATIENT
Start: 2021-02-09 | End: 2021-04-30 | Stop reason: SDUPTHER

## 2021-02-09 RX ORDER — ATENOLOL 50 MG/1
50 TABLET ORAL DAILY
Qty: 30 TABLET | Refills: 3 | Status: SHIPPED | OUTPATIENT
Start: 2021-02-09 | End: 2021-03-09 | Stop reason: SDUPTHER

## 2021-02-09 NOTE — LETTER
Leo Marks  1960  [id number]    Have you had any Chest Pain that is not new? - No  If Yes DO EKG - How does it feel -    How long does the pain last -      How long have you been having the pain -    Did you take a    And did it relieve the pain -      DO EKG IF: Patient has a Heart Rate above 100 or below 40     CAD (Coronary Artery Disease) patient should have one on file every 6 months        Have you had any Shortness of Breath - No  If Yes - When     Have you had any dizziness - No  If Yes DO ORTHOSTATIC BP - when do you feel dizzy    How long does it last .        Sitting wait 5 minutes do supine (laying down) wait 5 minutes then do standing - log each in \"vitals\" area in Epic   Be sure to ask what symptoms they are having if they get dizzy while completing ortho stats such as room spinning, nausea, etc.    Have you had any palpitations that are not new? - No  If Yes DO EKG - Do you feel your heart   How long does it last - . Is the patient on any of the following medications -   If Yes DO EKG - Needs done every 6 months    Do you have any edema - swelling in No    If Yes - CHECK TO SEE IF THE EDEMA IS PITTING  How long have they been having edema -   If Yes - Have they worn compression stockings     Vein \"LEG PROBLEM Questionnaire\"  1. Do you have prominent leg veins? 2. Do you have any skin discoloration? 3. Do you have any healed or active sores? 4. Do you have swelling of the legs? 5. Do you have a family history of varicose veins? 6. Does your profession involve pro-longed        standing or heavy lifting? 7. Have you been fighting overweight problems? 8. Do you have restless legs? 9. Do you have any night time cramps? 10. Do you have any of the following in your legs:             Do you have a surgery or procedure scheduled in the near future -   If Yes- DO EKG  If Yes - Who is the surgery with? Phone number of physician   Fax number of physician   Type of surgery   GIVE THIS INFORMATION TO CHELSY LEDESMA     Ask patient if they want to sign up for MyChart if they are not already signed up     Check to see if we have an E-MAIL on file for the patient     Check medication list thoroughly!!! AND RECONCILE OUTSIDE MEDICATIONS  If dose has changed change the entire order not just the [place] At check out add to every patient's \"wrap up\" the following dot phrase AFTERHOURSEDUCATION and ensure we explain this to our patients

## 2021-02-09 NOTE — PROGRESS NOTES
Pt here in office & educated on [facility] for Dx: ABN NM/CP, scheduled for 2/15/21 @ 11, w/arrival @ 9, @ [facility]; risks explained; & consents signed. Pre-admission orders given to pt for labs & CXR, which are due 2/9/21 @ [address]. Instructions given to pt to:  NPO after midnight night before procedure; Call hospital @ [phone] to pre-register; May take rest of morning meds. am of procedure; & pt voiced understanding. Copies of consent, pre-testing orders, & info. sheet SCANNED INTO CHART.

## 2021-02-09 NOTE — PROGRESS NOTES
Bita Loco MD                                  CARDIOLOGY  NOTE       Chief Complaint:    Follow up palpitations     ++  Flutter sensation in the chest   Shortness of breath - improved  ++ chest pressure radiating to neck       Stress Test 1/13/2021    [facility] physician Dr. Maryjane Mejia .    Small sized defect of moderate severity which is persistent involving    anterior wall of myocardium likely secondary to breast artifact however    anterior wall infarct can not be ruled out. No reversible defect noted.    Normal EF 77 % with normal ventricular contractility.        Recommendation    Probably normal Stress MPI with anterior wall breast attenuation.    Normal wall motion with preserved LVEF    Discuss the results with patient as outpatient                 ECHOCARDIOGRAM 10/14/2020     Summary   Left ventricular function is normal, EF is estimated at 55-60%. Mild left ventricular hypertrophy. E/A reversal; indeterminate diastolic function. Mild tricuspid regurgitation. No significant valvular disease noted. Left ventricular function is normal, EF is estimated at 55-60%. Mild left ventricular hypertrophy. E/A reversal; indeterminate diastolic function. No evidence of pericardial effusion. HOLTER  MONITOR 10/1/2020     Patient Name: Viviana Lorenz   Medical Record Number: [id number]  Date: 10/12/2020     62 y.o. 1960     INDICATIONS:  Palpitations     FINDINGS:  The patient had predominantly sinus rhythm. Heart rate varied from 48 bpm to 99 bpm.  The patients average heart rate was 67 bpm.    The patient had a holter monitor tracing done for 48 hour(s)  The patient had 3195 ventricular ectopic activity. All single beats, no runs noted. 1.75 % total burden. The patient had 48 supraventricular ectopy.    The longest R/R interval was 1.42 seconds. No other major arrhythmia was noted.     Events of fluttering in chest correspond to Vent.  Ectopies and Normal Sinus Rhythm      CONCLUSION:    Normal Sinus Rhythm   Rare Ventricular Ectopies with burden of 1.75%     RECOMMENDATIONS:      Medical Management. Recent Cardiac HPI:     [place] is a 62y.o. year old female with past medical history significant for hypertension, back pain presents to the clinic with a chief complaint of fluttering sensation in the chest.  According to the patient, she has very frequent fluttering sensation originating in the abdomen/epigastrium and radiating up the chest.  The frequency of the sensations is every 1-2 hours the usually last for few minutes. Patient denies any presyncope syncope. Patient denies any particular chest pain. Patient denies palpitations  She does complain of occasional shortness of breath and leg edema    Patient has been hypertensive and has been maintained on atenolol 50 mg twice a day. Recently she was started on lisinopril 10 mg by PCP however patient did not take due to dizziness. In the past patient was maintained on amlodipine but due to leg edema she stopped that. She was also on hydrochlorothiazide and she stopped that secondary to hair loss. In the office today her blood pressure is 140/80. Does mention that she checks her blood pressure at home 2-3 times a day and usually her blood pressure is in the 130s to 481O range systolic.     Patient denies any further complaints    EKG office today shows normal sinus rhythm          Current Outpatient Medications   Medication Sig Dispense Refill    aspirin 81 MG chewable tablet Take 81 mg by mouth daily      Cholecalciferol (VITAMIN D) 50 MCG (2000 UT) CAPS capsule Take by mouth      losartan (COZAAR) 50 MG tablet Take 1 tablet by mouth daily 30 tablet 2    atenolol (TENORMIN) 50 MG tablet Take 1 tablet by mouth daily 30 tablet 3    ciprofloxacin (CIPRO) 500 MG tablet Take 1 tablet by mouth 2 times daily for 10 days 20 tablet 0    Multiple Vitamins-Minerals (THERAPEUTIC MULTIVITAMIN-MINERALS) tablet Take 1 tablet by mouth daily      atorvastatin (LIPITOR) 20 MG tablet Take 1 tablet by mouth daily 90 tablet 1     No current facility-administered medications for this visit. Allergies:     Metoprolol and Naproxen    Patient History:    Past Medical History:   Diagnosis Date    Arthritis     H/O echocardiogram 10/14/2020    EF 55-60%,  No significant valvular disease noted.  Hypertension      Past Surgical History:   Procedure Laterality Date     SECTION       Family History   Problem Relation Age of Onset    Heart Surgery Father     High Cholesterol Father     Hypertension Father     Hypertension Sister     Hypertension Brother      Social History     Tobacco Use    Smoking status: Never Smoker    Smokeless tobacco: Never Used   Substance Use Topics    Alcohol use: No        Review of Systems:     · Constitutional:  No Fever or Weight Loss   · Eyes: No Decreased Vision  · ENT: No Headaches, Hearing Loss or Vertigo  · Cardiovascular: No Chest Pain,  + Shortness of breath, No Palpitations. No Edema. Fluttering sensation in the chest  · Respiratory: No cough or wheezing .  No Respiratory distress   · Gastrointestinal: No abdominal pain, appetite loss, blood in stools, constipation, diarrhea or heartburn  · Genitourinary: No dysuria, trouble voiding, or hematuria  · Musculoskeletal:  denies any new  joint aches , or pain   · Integumentary: No rash or pruritis  · Neurological: No TIA or stroke symptoms  · Psychiatric: No anxiety or depression  · Endocrine: No malaise, fatigue or temperature intolerance  · Hematologic/Lymphatic: No bleeding problems, blood clots or swollen lymph nodes  · Allergic/Immunologic: No nasal congestion or hives        Objective:      Physical Exam:    /86   Pulse 68   Ht 5' 6\" (1.676 m)   Wt 227 lb 3.2 oz (103.1 kg)   BMI 36.67 kg/m²   Wt Readings from Last 3 Encounters:   21 227 lb 3.2 oz (103.1 kg)   21 226 lb (102.5 kg)   20 229 lb 3.2 oz (104 kg)     Body mass index is 36.67 kg/m². Vitals:    02/09/21 0814   BP: 136/86   Pulse: 68        General Appearance and Constitutional: Conversant, Well developed, Well nourished, No acute distress, Non-toxic appearance. HEENT:  Normocephalic, Atraumatic, Bilateral external ears normal, Oropharynx moist, No oral exudates,   Nose normal.   Neck- Normal range of motion, No tenderness, Supple  Eyes:  Conjunctiva normal, No discharge. Respiratory:  Normal breath sounds, No respiratory distress, No wheezing, No Rales, No Ronchi. No chest tenderness. Cardiovascular: S1-S2, no added heart sounds, No Mumurs appreciated. No gallops, rubs. No Pedal Edema   GI:  Bowel sounds normal, Soft, No tenderness,  :  No costovertebral angle tenderness   Musculoskeletal:  No gross deformities.  Back- No tenderness  Integument:  Well hydrated, no rash   Lymphatic:  No lymphadenopathy noted   Neurologic:  Alert & oriented x 3, Normal motor function, normal sensory function, no focal deficits noted   Psychiatric:  Speech and behavior appropriate       Medical decision making and Data review:    DATA:    Lab Results   Component Value Date    TROPONINT <0.010 12/17/2018     BNP:  No results found for: PROBNP  PT/INR:  No results found for: PTINR  Lab Results   Component Value Date    LABA1C 6.3 09/17/2020    LABA1C 6.2 12/17/2019     Lab Results   Component Value Date    CHOL 233 (H) 09/17/2020    TRIG 75 09/17/2020    HDL 68 (H) 09/17/2020    LDLCALC 150 (H) 09/17/2020     Lab Results   Component Value Date    WBC 6.6 01/28/2020    HGB 13.6 01/28/2020    HCT 40.4 01/28/2020    MCV 87.5 01/28/2020     01/28/2020     TSH:   Lab Results   Component Value Date    TSH 1.65 01/28/2020     Lab Results   Component Value Date    AST 18 01/28/2020    ALT 17 01/28/2020    BILITOT 0.3 01/28/2020    ALKPHOS 83 01/28/2020         All labs, medications and tests reviewed by myself including data and history from outside source , patient and available family . 1. Chest pain, unspecified type    2. Mixed hyperlipidemia    3. Essential hypertension    4. Shortness of breath on exertion    5. Palpitations    6. Obesity (BMI 30-39. 9)         Impression and Plan:      1. Palpitations / PVCs:  EKG shows normal sinus rhythm. 48 hours Holter monitor shows rare PVCs accounting for 1.75% total QRS. Patient could not tolerate metoprolol as it gave her headaches. Restarted atenolol. 2. Chest pressure radiating to the neck: In setting of frequent PVCs. Stress test shows anterior wall perfusion defect, differential diagnosis breast attenuation versus ischemia. Due to ongoing chest discomfort we will proceed with left heart Cath   3. shortness of breath, improved: Patient has mild dyspnea on exertion - Echocardiogram is within normal limits. Likely secondary to debility  4. Hyperlipidemia: We will start low-dose of statin therapy  5. Essential hypertension: Increase losartan 50mg daily /atenolol 50 mg daily. 6. Obesity BMI 36.67: Diet and Exercise: DASH Diet / Counseled about weight loss and exercise. Return in about 3 months (around 5/9/2021). Counseled extensively and medication compliance urged. We discussed that for the  prevention of ASCVD our  goal is aggressive risk modification. Patient is encouraged to exercise even a brisk walk for 30 minutes  at least 3 to 4 times a week   Various goals were discussed and questions answered. Continue current medications. Appropriate prescriptions are addressed and refills ordered. Questions answered and patient verbalizes understanding. Call for any problems, questions, or concerns.

## 2021-02-09 NOTE — LETTER
Linda Montes [address] 69 WITH POSSIBLE PERCUTANEOUS CORONARY INTERVENTION     Patient Name: Loren Ellington   : 1960   MRN# [id number]    Date of Procedure: 2/15/21 Time: 11 Arrival Time: 9 AM    The catheterization and angiogram are usually outpatient procedures, however if stenting is needed you will stay overnight. You will need to be at the hospital two hours before the procedure. You will need to arrange for someone to drive you home. You will go to registration in the main lobby. HOSPITAL:  [facility])  Call to Pre[place] at: [phone] 1-2 days before your procedure. Please have blood work and chest-x-ray done 1 to 2 days before procedure at    [facility]. X Please do not have anything by mouth after midnight prior to or 8 hours before the procedure. X You may take your medications with a sip of water in the morning before your procedure or  take them with you. X Please have COVID-19 Test done on 21 You will need to [facility] yourself until procedure. Patient Signature:  _________________________   Staff Signature: Naomi Orlando   Staff Given Instructions:_______________________________                                Linda Garber\",     Patient Name: Loren Ellington   : 1960   MRN# [id number]    Date of Procedure: 2/15/21 Time: 11 AM     DIAGNOSIS:   Z01.810    LEFT HEART CATHETERIZATION WITH POSSIBLE PERCUTANEOUS CORONARY INTERVENTION       X Chest x-Ray PA & Lateral View        X Type & Screen     X CBC  X BMP              ? PLEASE CALL ABNORMAL RESULTS TO THE  PHYSICIAN? ATTENTION PATIENT: Pretesting is to be done before the cath. You do not have to fast for the lab work. You must go to the [facility] behind [facility] at [address] [place]. to have this lab work done. Phone: [phone] Hours: 7:00 am to 5:00 pm       PHYSICIAN SIGNATURE:      DATE:_________________Time:______________                                        Irina Mcqueen  Pre Cath Lab Orders   Patient Name: Darshan Márquez   : 1960   MRN# [id number]  Pre Cath Lab orders   1. IV of 0.9 NS@ 75ml/hr started 2 hours prior to procedure. (#20 Gauge Insyte or larger)  2. Diazepam (Valium) 5 mg po ONCE in Cath Lab. 3. Diphenhydramine (Benadryl) 25 mg ONCE. PHYSICIAN SIGNATURE:      DATE:   TIME:______________                                                                                    Informed Consent for Anesthesia/Sedation, Surgery, Invasive Procedures, and other High-risk Interventions and Medication use      *This consent is applicable for 30 days following patient signature*    Procedure(s)   IDarshan authorize, Dr. Alia Ely  and the associate(s) or assistant(s) of his/her choice, to perform the following procedure(s): [address]       I know that unexpected conditions may require additional or different procedures than those above. I authorize the above named practitioner(s) perform these as necessary and desirable. This is based on the practitioners professional judgment. The above named practitioner has discussed the above procedure(s) with me, including:  ? Potential benefits, including likelihood of success of the procedure(s) goals  ? Risks  ? Side effects, risk of death, and risk of infection  ?  Any potential problems that might occur during recuperation or healing post-procedure ___ I do NOT HAVE a DNR order. I am a Full-code.   I will receive CPR, intubation,  chest compressions, medications, and/or other life saving measures if I have a  cardiac or respiratory arrest.    ___ I have a Do Not Resuscitate (DNR)order.   (caroline one below)  ___  I rescind my DNR for surgery and immediate post-operative period through Phase 2 recovery. This means, for that time period, I will be a Full-code and receive CPR, intubation, chest compressions, medications, and/or other life saving measures, if I have a cardiac or respiratory arrest.    ___ I WANT to keep my DNR in effect during my procedure(s) and immediate post-operative recovery period through Phase 2 recovery. (Complete separate refusal form)     This form has been fully explained to me. I understand its contents.       Patients Signature: ___________________________Date: ________  Time: ________    If patient unable to sign, has engaged the [address] [place] [address], is a minor, or has a court-appointed Guardian:  [facility] Representative Name (Print):  ____________________________________      Relationship ([place] one):    Guardian   Parent    Spouse    HCPOA   Child   Sibling  Next-of-Kin Friend    Patients Representative Signature: _______________________________________              Date: ______________  Time: __________    [facility] ([facility] Witness________________________  Date: ________   Time: _________    Physician/Practitioner _______________________  Date: ________   Time: _________                 [facility] [place]    Dr. Mcclellan Fittin [facility] CORONARY INTERVENTION     Patient Name: Tamy Tariq   : 1960   MRN# [id number]    Home Phone Number: [phone]   Weight:    Wt Readings from Last 3 Encounters:   21 227 lb 3.2 oz (103.1 kg)   21 226 lb (102.5 kg)   20 229 lb 3.2 oz (104 kg) Insurance: Payor: Carly Massey / Plan: [facility] MEDICARE COMPLETE / Product Type: *No Product type* /     Date of Procedure: 2/15/21 Time: 11 Arrival Time: 9 AM    Diagnosis:  ABN NM & CP  Allergies: Allergies   Allergen Reactions    Metoprolol      Pt states it gives her a HA    Naproxen         1) Call [facility] scheduling [phone]) or [phone] [facility] [facility]     PHONE OR   INSTANT MESSAGE  2) PREAUTHORIZATION NUMBER:    Spoke to:      From date:     expiration date:        [facility]: Prior Authorization  We will contact your insurance for prior authorization for the CPT code related to the procedure it is the patient's responsibility to contact their insurance to ensure they are following their medical plans provisions or requirements!

## 2021-02-11 LAB
SARS-COV-2: NOT DETECTED
SOURCE: NORMAL

## 2021-02-15 ENCOUNTER — HOSPITAL ENCOUNTER (OUTPATIENT)
Dept: CARDIAC CATH/INVASIVE PROCEDURES | Age: 61
Discharge: HOME | End: 2021-02-15
Attending: INTERNAL MEDICINE | Admitting: INTERNAL MEDICINE
Payer: MEDICARE

## 2021-02-15 VITALS
BODY MASS INDEX: 36.48 KG/M2 | OXYGEN SATURATION: 97 % | TEMPERATURE: 97.3 F | HEIGHT: 66 IN | HEART RATE: 78 BPM | WEIGHT: 227 LBS | DIASTOLIC BLOOD PRESSURE: 89 MMHG | RESPIRATION RATE: 18 BRPM | SYSTOLIC BLOOD PRESSURE: 177 MMHG

## 2021-02-15 PROCEDURE — C1769 GUIDE WIRE: HCPCS

## 2021-02-15 PROCEDURE — 93458 L HRT ARTERY/VENTRICLE ANGIO: CPT | Performed by: INTERNAL MEDICINE

## 2021-02-15 PROCEDURE — 2500000003 HC RX 250 WO HCPCS

## 2021-02-15 PROCEDURE — 2580000003 HC RX 258: Performed by: INTERNAL MEDICINE

## 2021-02-15 PROCEDURE — 2709999900 HC NON-CHARGEABLE SUPPLY

## 2021-02-15 PROCEDURE — 6370000000 HC RX 637 (ALT 250 FOR IP): Performed by: INTERNAL MEDICINE

## 2021-02-15 PROCEDURE — 6360000004 HC RX CONTRAST MEDICATION

## 2021-02-15 PROCEDURE — 6360000002 HC RX W HCPCS

## 2021-02-15 PROCEDURE — 93458 L HRT ARTERY/VENTRICLE ANGIO: CPT

## 2021-02-15 PROCEDURE — C1894 INTRO/SHEATH, NON-LASER: HCPCS

## 2021-02-15 PROCEDURE — C1725 CATH, TRANSLUMIN NON-LASER: HCPCS

## 2021-02-15 RX ORDER — SODIUM CHLORIDE 9 MG/ML
INJECTION, SOLUTION INTRAVENOUS CONTINUOUS
Status: DISCONTINUED | OUTPATIENT
Start: 2021-02-15 | End: 2021-02-15 | Stop reason: HOSPADM

## 2021-02-15 RX ORDER — DIPHENHYDRAMINE HCL 25 MG
25 TABLET ORAL ONCE
Status: COMPLETED | OUTPATIENT
Start: 2021-02-15 | End: 2021-02-15

## 2021-02-15 RX ORDER — DIAZEPAM 5 MG/1
5 TABLET ORAL ONCE
Status: COMPLETED | OUTPATIENT
Start: 2021-02-15 | End: 2021-02-15

## 2021-02-15 RX ORDER — SODIUM CHLORIDE 0.9 % (FLUSH) 0.9 %
10 SYRINGE (ML) INJECTION EVERY 12 HOURS SCHEDULED
Status: DISCONTINUED | OUTPATIENT
Start: 2021-02-15 | End: 2021-02-15 | Stop reason: HOSPADM

## 2021-02-15 RX ORDER — SODIUM CHLORIDE 0.9 % (FLUSH) 0.9 %
10 SYRINGE (ML) INJECTION PRN
Status: DISCONTINUED | OUTPATIENT
Start: 2021-02-15 | End: 2021-02-15 | Stop reason: HOSPADM

## 2021-02-15 RX ORDER — 0.9 % SODIUM CHLORIDE 0.9 %
500 INTRAVENOUS SOLUTION INTRAVENOUS ONCE
Status: DISCONTINUED | OUTPATIENT
Start: 2021-02-15 | End: 2021-02-15 | Stop reason: HOSPADM

## 2021-02-15 RX ORDER — HYDRALAZINE HYDROCHLORIDE 20 MG/ML
10 INJECTION INTRAMUSCULAR; INTRAVENOUS EVERY 10 MIN PRN
Status: DISCONTINUED | OUTPATIENT
Start: 2021-02-15 | End: 2021-02-15 | Stop reason: HOSPADM

## 2021-02-15 RX ORDER — ACETAMINOPHEN 325 MG/1
650 TABLET ORAL EVERY 4 HOURS PRN
Status: DISCONTINUED | OUTPATIENT
Start: 2021-02-15 | End: 2021-02-15 | Stop reason: HOSPADM

## 2021-02-15 RX ADMIN — DIPHENHYDRAMINE HYDROCHLORIDE 25 MG: 25 TABLET ORAL at 08:54

## 2021-02-15 RX ADMIN — DIAZEPAM 5 MG: 5 TABLET ORAL at 08:54

## 2021-02-15 RX ADMIN — SODIUM CHLORIDE: 9 INJECTION, SOLUTION INTRAVENOUS at 08:54

## 2021-02-15 NOTE — H&P
Leela Gonsalez MD                                  CARDIOLOGY  NOTE       Chief Complaint:      +  Flutter sensation in the chest   Shortness of breath - improved  ++ chest pressure radiating to neck       Stress Test 1/13/2021    Kiersten Giron physician Dr. Bell .    Small sized defect of moderate severity which is persistent involving    anterior wall of myocardium likely secondary to breast artifact however    anterior wall infarct can not be ruled out. No reversible defect noted.    Normal EF 77 % with normal ventricular contractility.        Recommendation    Probably normal Stress MPI with anterior wall breast attenuation.    Normal wall motion with preserved LVEF    Discuss the results with patient as outpatient                 ECHOCARDIOGRAM 10/14/2020     Summary   Left ventricular function is normal, EF is estimated at 55-60%. Mild left ventricular hypertrophy. E/A reversal; indeterminate diastolic function. Mild tricuspid regurgitation. No significant valvular disease noted. Left ventricular function is normal, EF is estimated at 55-60%. Mild left ventricular hypertrophy. E/A reversal; indeterminate diastolic function. No evidence of pericardial effusion. HOLTER  MONITOR 10/1/2020     Patient Name: Tee Morning   Medical Record Number: [id number]  Date: 10/12/2020     62 y.o. 1960     INDICATIONS:  Palpitations     FINDINGS:  The patient had predominantly sinus rhythm. Heart rate varied from 48 bpm to 99 bpm.  The patients average heart rate was 67 bpm.    The patient had a holter monitor tracing done for 48 hour(s)  The patient had 3195 ventricular ectopic activity. All single beats, no runs noted. 1.75 % total burden. The patient had 48 supraventricular ectopy.    The longest R/R interval was 1.42 seconds. No other major arrhythmia was noted.     Events of fluttering in chest correspond to Vent.  Ectopies and Normal Sinus Rhythm      CONCLUSION:    Normal Sinus Rhythm   Rare Ventricular Ectopies with burden of 1.75%     RECOMMENDATIONS:      Medical Management. Recent Cardiac HPI:     Frankie Kennedy is a 62y.o. year old female with past medical history significant for hypertension, back pain presents to the clinic with a chief complaint of fluttering sensation in the chest.  According to the patient, she has very frequent fluttering sensation originating in the abdomen/epigastrium and radiating up the chest.  The frequency of the sensations is every 1-2 hours the usually last for few minutes. Patient denies any presyncope syncope. Patient denies any particular chest pain. Patient denies palpitations  She does complain of occasional shortness of breath and leg edema    Patient has been hypertensive and has been maintained on atenolol 50 mg twice a day. Recently she was started on lisinopril 10 mg by PCP however patient did not take due to dizziness. In the past patient was maintained on amlodipine but due to leg edema she stopped that. She was also on hydrochlorothiazide and she stopped that secondary to hair loss. In the office today her blood pressure is 140/80. Does mention that she checks her blood pressure at home 2-3 times a day and usually her blood pressure is in the 130s to 419U range systolic. Patient denies any further complaints    EKG office today shows normal sinus rhythm          Current Facility-Administered Medications   Medication Dose Route Frequency Provider Last Rate Last Admin    0.9 % sodium chloride infusion   Intravenous Continuous Ky Fisher MD 75 mL/hr at 02/15/21 0854 New Bag at 02/15/21 0854       Allergies:     Metoprolol and Naproxen    Patient History:    Past Medical History:   Diagnosis Date    Arthritis     H/O echocardiogram 10/14/2020    EF 55-60%,  No significant valvular disease noted.     Hypertension      Past Surgical History:   Procedure Laterality Date     SECTION       Family History Problem Relation Age of Onset    Heart Surgery Father     High Cholesterol Father     Hypertension Father     Hypertension Sister     Hypertension Brother      Social History     Tobacco Use    Smoking status: Never Smoker    Smokeless tobacco: Never Used   Substance Use Topics    Alcohol use: No        Review of Systems:     · Constitutional:  No Fever or Weight Loss   · Eyes: No Decreased Vision  · ENT: No Headaches, Hearing Loss or Vertigo  · Cardiovascular: No Chest Pain,  + Shortness of breath, No Palpitations. No Edema. Fluttering sensation in the chest  · Respiratory: No cough or wheezing . No Respiratory distress   · Gastrointestinal: No abdominal pain, appetite loss, blood in stools, constipation, diarrhea or heartburn  · Genitourinary: No dysuria, trouble voiding, or hematuria  · Musculoskeletal:  denies any new  joint aches , or pain   · Integumentary: No rash or pruritis  · Neurological: No TIA or stroke symptoms  · Psychiatric: No anxiety or depression  · Endocrine: No malaise, fatigue or temperature intolerance  · Hematologic/Lymphatic: No bleeding problems, blood clots or swollen lymph nodes  · Allergic/Immunologic: No nasal congestion or hives        Objective:      Physical Exam:    BP (!) 161/88   Pulse 78   Temp 97.3 °F (36.3 °C) (Temporal)   Resp 17   Ht 5' 6\" (1.676 m)   Wt 227 lb (103 kg)   SpO2 99%   BMI 36.64 kg/m²   Wt Readings from Last 3 Encounters:   02/15/21 227 lb (103 kg)   02/09/21 227 lb 3.2 oz (103.1 kg)   02/05/21 226 lb (102.5 kg)     Body mass index is 36.64 kg/m². Vitals:    02/15/21 0834   BP: (!) 161/88   Pulse: 78   Resp: 17   Temp: 97.3 °F (36.3 °C)   SpO2: 99%        General Appearance and Constitutional: Conversant, Well developed, Well nourished, No acute distress, Non-toxic appearance.    HEENT:  Normocephalic, Atraumatic, Bilateral external ears normal, Oropharynx moist, No oral exudates,   Nose normal.   Neck- Normal range of motion, No tenderness, wall perfusion defect, differential diagnosis breast attenuation versus ischemia. Due to ongoing chest discomfort we will proceed with left heart Cath   3. shortness of breath, improved: Patient has mild dyspnea on exertion - Echocardiogram is within normal limits. Likely secondary to debility  4. Hyperlipidemia: We will start low-dose of statin therapy  5. Essential hypertension: Increase losartan 50mg daily /atenolol 50 mg daily. 6. Obesity BMI 36.67: Diet and Exercise: DASH Diet / Counseled about weight loss and exercise.

## 2021-02-15 NOTE — FLOWSHEET NOTE
Pt to pt  in wheelchair per RN for d/c home with transport co. Right radial site free of bleeding/hematoma at time of d/c

## 2021-02-26 ENCOUNTER — HOSPITAL ENCOUNTER (OUTPATIENT)
Dept: ULTRASOUND IMAGING | Age: 61
Discharge: HOME | End: 2021-02-26
Payer: MEDICARE

## 2021-02-26 DIAGNOSIS — N39.0 RECURRENT UTI: ICD-10-CM

## 2021-02-26 PROCEDURE — 76770 US EXAM ABDO BACK WALL COMP: CPT

## 2021-03-09 ENCOUNTER — OFFICE VISIT (OUTPATIENT)
Dept: CARDIOLOGY CLINIC | Age: 61
End: 2021-03-09
Payer: MEDICARE

## 2021-03-09 VITALS
BODY MASS INDEX: 37.28 KG/M2 | SYSTOLIC BLOOD PRESSURE: 134 MMHG | WEIGHT: 232 LBS | HEIGHT: 66 IN | DIASTOLIC BLOOD PRESSURE: 88 MMHG | HEART RATE: 72 BPM

## 2021-03-09 DIAGNOSIS — R07.9 CHEST PAIN, UNSPECIFIED TYPE: ICD-10-CM

## 2021-03-09 DIAGNOSIS — R06.02 SHORTNESS OF BREATH ON EXERTION: ICD-10-CM

## 2021-03-09 DIAGNOSIS — R00.2 PALPITATIONS: ICD-10-CM

## 2021-03-09 DIAGNOSIS — E78.2 MIXED HYPERLIPIDEMIA: Primary | ICD-10-CM

## 2021-03-09 DIAGNOSIS — E66.9 OBESITY (BMI 30-39.9): ICD-10-CM

## 2021-03-09 PROCEDURE — 99213 OFFICE O/P EST LOW 20 MIN: CPT | Performed by: INTERNAL MEDICINE

## 2021-03-09 PROCEDURE — G8427 DOCREV CUR MEDS BY ELIG CLIN: HCPCS | Performed by: INTERNAL MEDICINE

## 2021-03-09 PROCEDURE — G8417 CALC BMI ABV UP PARAM F/U: HCPCS | Performed by: INTERNAL MEDICINE

## 2021-03-09 PROCEDURE — 1036F TOBACCO NON-USER: CPT | Performed by: INTERNAL MEDICINE

## 2021-03-09 PROCEDURE — G8484 FLU IMMUNIZE NO ADMIN: HCPCS | Performed by: INTERNAL MEDICINE

## 2021-03-09 PROCEDURE — 3017F COLORECTAL CA SCREEN DOC REV: CPT | Performed by: INTERNAL MEDICINE

## 2021-03-09 RX ORDER — ATENOLOL 50 MG/1
75 TABLET ORAL DAILY
Qty: 30 TABLET | Refills: 3 | Status: SHIPPED | OUTPATIENT
Start: 2021-03-09 | End: 2021-05-06

## 2021-03-09 NOTE — LETTER
Silvia Argueta  1960  [id number]    Have you had any Chest Pain that is not new? - No  If Yes DO EKG - How does it feel -    How long does the pain last -      How long have you been having the pain -    Did you take a    And did it relieve the pain -      DO EKG IF: Patient has a Heart Rate above 100 or below 40     CAD (Coronary Artery Disease) patient should have one on file every 6 months        Have you had any Shortness of Breath - Yes  If Yes - When on exertion    Have you had any dizziness - No  If Yes DO ORTHOSTATIC BP - when do you feel dizzy    How long does it last .        Sitting wait 5 minutes do supine (laying down) wait 5 minutes then do standing - log each in \"vitals\" area in Epic   Be sure to ask what symptoms they are having if they get dizzy while completing ortho stats such as: room spinning, nausea, etc.    Have you had any palpitations that are not new? - Yes  If Yes DO EKG - Do you feel your heart fluttering  How long does it last - .  seconds     Is the patient on any of the following medications -   If Yes DO EKG - Needs done every 6 months    Do you have any edema - swelling in No    If Yes - CHECK TO SEE IF THE EDEMA IS PITTING  How long have they been having edema -   If Yes - Have they worn compression stockings If they have worn compression stockings      Vein \"LEG PROBLEM Questionnaire\"  1. Do you have prominent leg veins? 2. Do you have any skin discoloration? 3. Do you have any healed or active sores? 4. Do you have swelling of the legs? 5. Do you have a family history of varicose veins? 6. Does your profession involve pro-longed        standing or heavy lifting? 7. Have you been fighting overweight problems? 8. Do you have restless legs? 9. Do you have any night time cramps?     10. Do you have any of the following in your legs:             Do you have a surgery or procedure scheduled in

## 2021-03-09 NOTE — PATIENT INSTRUCTIONS
Please be informed that if you contact our office outside of normal business hours the physician on call cannot help with any scheduling or rescheduling issues, procedure instruction questions or any type of medication issue. We advise you for any urgent/emergency that you go to the nearest emergency room!     PLEASE CALL OUR OFFICE DURING NORMAL BUSINESS HOURS    Monday - Friday   8 am to 5 pm    [place]: [phone]    [place]:  [phone]

## 2021-03-09 NOTE — PROGRESS NOTES
seconds. No other major arrhythmia was noted.     Events of fluttering in chest correspond to Vent. Ectopies and Normal Sinus Rhythm      CONCLUSION:    Normal Sinus Rhythm   Rare Ventricular Ectopies with burden of 1.75%     RECOMMENDATIONS:      Medical Management. Recent Cardiac HPI:     Chriss Funez is a 62y.o. year old female with past medical history significant for hypertension, back pain presents to the clinic with a chief complaint of fluttering sensation in the chest.  According to the patient, she has very frequent fluttering sensation originating in the abdomen/epigastrium and radiating up the chest.  The frequency of the sensations is every 1-2 hours the usually last for few minutes. Patient denies any presyncope syncope. Patient denies any particular chest pain. Patient denies palpitations  She does complain of occasional shortness of breath and leg edema    Patient has been hypertensive and has been maintained on atenolol 50 mg twice a day. Recently she was started on lisinopril 10 mg by PCP however patient did not take due to dizziness. In the past patient was maintained on amlodipine but due to leg edema she stopped that. She was also on hydrochlorothiazide and she stopped that secondary to hair loss. In the office today her blood pressure is 140/80. Does mention that she checks her blood pressure at home 2-3 times a day and usually her blood pressure is in the 130s to 043R range systolic.     Patient denies any further complaints    EKG office today shows normal sinus rhythm          Current Outpatient Medications   Medication Sig Dispense Refill    atenolol (TENORMIN) 50 MG tablet Take 1.5 tablets by mouth daily 30 tablet 3    aspirin 81 MG chewable tablet Take 81 mg by mouth daily      Cholecalciferol (VITAMIN D) 50 MCG (2000 UT) CAPS capsule Take by mouth      losartan (COZAAR) 50 MG tablet Take 1 tablet by mouth daily 30 tablet 2    Multiple Vitamins-Minerals (THERAPEUTIC MULTIVITAMIN-MINERALS) tablet Take 1 tablet by mouth daily      atorvastatin (LIPITOR) 20 MG tablet Take 1 tablet by mouth daily 90 tablet 1     No current facility-administered medications for this visit. Allergies:     Metoprolol and Naproxen    Patient History:    Past Medical History:   Diagnosis Date    Arthritis     H/O echocardiogram 10/14/2020    EF 55-60%,  No significant valvular disease noted.  Hypertension      Past Surgical History:   Procedure Laterality Date     SECTION       Family History   Problem Relation Age of Onset    Heart Surgery Father     High Cholesterol Father     Hypertension Father     Hypertension Sister     Hypertension Brother      Social History     Tobacco Use    Smoking status: Never Smoker    Smokeless tobacco: Never Used   Substance Use Topics    Alcohol use: No     Comment: caffeine 1 cup of coffee a day        Review of Systems:     · Constitutional:  No Fever or Weight Loss   · Eyes: No Decreased Vision  · ENT: No Headaches, Hearing Loss or Vertigo  · Cardiovascular: No Chest Pain,  + Shortness of breath, No Palpitations. No Edema. Fluttering sensation in the chest  · Respiratory: No cough or wheezing .  No Respiratory distress   · Gastrointestinal: No abdominal pain, appetite loss, blood in stools, constipation, diarrhea or heartburn  · Genitourinary: No dysuria, trouble voiding, or hematuria  · Musculoskeletal:  denies any new  joint aches , or pain   · Integumentary: No rash or pruritis  · Neurological: No TIA or stroke symptoms  · Psychiatric: No anxiety or depression  · Endocrine: No malaise, fatigue or temperature intolerance  · Hematologic/Lymphatic: No bleeding problems, blood clots or swollen lymph nodes  · Allergic/Immunologic: No nasal congestion or hives        Objective:      Physical Exam:    /88   Pulse 72   Ht 5' 6\" (1.676 m)   Wt 232 lb (105.2 kg)   BMI 37.45 kg/m²   Wt Readings from Last 3 Encounters:   21 232 lb (105.2 kg)   02/15/21 227 lb (103 kg)   02/09/21 227 lb 3.2 oz (103.1 kg)     Body mass index is 37.45 kg/m². Vitals:    03/09/21 0817   BP: 134/88   Pulse: 72        General Appearance and Constitutional: Conversant, Well developed, Well nourished, No acute distress, Non-toxic appearance. HEENT:  Normocephalic, Atraumatic, Bilateral external ears normal, Oropharynx moist, No oral exudates,   Nose normal.   Neck- Normal range of motion, No tenderness, Supple  Eyes:  Conjunctiva normal, No discharge. Respiratory:  Normal breath sounds, No respiratory distress, No wheezing, No Rales, No Ronchi. No chest tenderness. Cardiovascular: S1-S2, no added heart sounds, No Mumurs appreciated. No gallops, rubs. No Pedal Edema   GI:  Bowel sounds normal, Soft, No tenderness,  :  No costovertebral angle tenderness   Musculoskeletal:  No gross deformities.  Back- No tenderness  Integument:  Well hydrated, no rash   Lymphatic:  No lymphadenopathy noted   Neurologic:  Alert & oriented x 3, Normal motor function, normal sensory function, no focal deficits noted   Psychiatric:  Speech and behavior appropriate       Medical decision making and Data review:    DATA:    Lab Results   Component Value Date    TROPONINT <0.010 12/17/2018     BNP:  No results found for: PROBNP  PT/INR:  No results found for: PTINR  Lab Results   Component Value Date    LABA1C 6.3 09/17/2020    LABA1C 6.2 12/17/2019     Lab Results   Component Value Date    CHOL 233 (H) 09/17/2020    TRIG 75 09/17/2020    HDL 68 (H) 09/17/2020    LDLCALC 150 (H) 09/17/2020     Lab Results   Component Value Date    WBC 6.3 02/09/2021    HGB 13.2 02/09/2021    HCT 42.6 02/09/2021    MCV 90.8 02/09/2021     02/09/2021     TSH:   Lab Results   Component Value Date    TSH 1.65 01/28/2020     Lab Results   Component Value Date    AST 18 01/28/2020    ALT 17 01/28/2020    BILITOT 0.3 01/28/2020    ALKPHOS 83 01/28/2020         All labs, medications and tests reviewed by myself including data and history from outside source , patient and available family . 1. Mixed hyperlipidemia    2. Shortness of breath on exertion    3. Palpitations    4. Chest pain, unspecified type    5. Obesity (BMI 30-39. 9)         Impression and Plan:      1. Palpitations / PVCs:  EKG shows normal sinus rhythm. 48 hours Holter monitor shows rare PVCs accounting for 1.75% total QRS. On Atenolol (increase dose to 75 mg daily). Could not tolerate metoprolol. 2. Chest pressure radiating to the neck: In setting of frequent PVCs. Stress test shows anterior wall perfusion defect, left heart Cath normal.   3. Shortness of breath, improved: Patient has mild dyspnea on exertion - Echocardiogram is within normal limits. Likely secondary to debility  4. Hyperlipidemia: We will start low-dose of statin therapy  5. Essential hypertension: Increased losartan 50mg daily /atenolol 75 mg daily. 6. Obesity BMI 36.67: Diet and Exercise: DASH Diet / Counseled about weight loss and exercise. Return in about 6 months (around 9/9/2021) for [employer] . Counseled extensively and medication compliance urged. We discussed that for the  prevention of ASCVD our  goal is aggressive risk modification. Patient is encouraged to exercise even a brisk walk for 30 minutes  at least 3 to 4 times a week   Various goals were discussed and questions answered. Continue current medications. Appropriate prescriptions are addressed and refills ordered. Questions answered and patient verbalizes understanding. Call for any problems, questions, or concerns.

## 2021-04-15 ENCOUNTER — OFFICE VISIT (OUTPATIENT)
Dept: FAMILY MEDICINE CLINIC | Age: 61
End: 2021-04-15
Payer: MEDICARE

## 2021-04-15 VITALS
HEART RATE: 78 BPM | SYSTOLIC BLOOD PRESSURE: 134 MMHG | HEIGHT: 66 IN | TEMPERATURE: 96.9 F | WEIGHT: 230 LBS | DIASTOLIC BLOOD PRESSURE: 80 MMHG | BODY MASS INDEX: 36.96 KG/M2 | OXYGEN SATURATION: 99 %

## 2021-04-15 DIAGNOSIS — N30.00 ACUTE CYSTITIS WITHOUT HEMATURIA: ICD-10-CM

## 2021-04-15 DIAGNOSIS — R30.0 DYSURIA: Primary | ICD-10-CM

## 2021-04-15 LAB
BILIRUBIN, POC: ABNORMAL
BLOOD URINE, POC: ABNORMAL
CLARITY, POC: ABNORMAL
COLOR, POC: ABNORMAL
GLUCOSE URINE, POC: ABNORMAL
KETONES, POC: ABNORMAL
LEUKOCYTE EST, POC: ABNORMAL
NITRITE, POC: ABNORMAL
PH, POC: 7
PROTEIN, POC: ABNORMAL
SPECIFIC GRAVITY, POC: 1.02
UROBILINOGEN, POC: ABNORMAL

## 2021-04-15 PROCEDURE — 1036F TOBACCO NON-USER: CPT | Performed by: FAMILY MEDICINE

## 2021-04-15 PROCEDURE — 81002 URINALYSIS NONAUTO W/O SCOPE: CPT | Performed by: FAMILY MEDICINE

## 2021-04-15 PROCEDURE — G8417 CALC BMI ABV UP PARAM F/U: HCPCS | Performed by: FAMILY MEDICINE

## 2021-04-15 PROCEDURE — 99214 OFFICE O/P EST MOD 30 MIN: CPT | Performed by: FAMILY MEDICINE

## 2021-04-15 PROCEDURE — G8427 DOCREV CUR MEDS BY ELIG CLIN: HCPCS | Performed by: FAMILY MEDICINE

## 2021-04-15 PROCEDURE — 3017F COLORECTAL CA SCREEN DOC REV: CPT | Performed by: FAMILY MEDICINE

## 2021-04-15 RX ORDER — NITROFURANTOIN 25; 75 MG/1; MG/1
100 CAPSULE ORAL 2 TIMES DAILY
Qty: 20 CAPSULE | Refills: 0 | Status: SHIPPED | OUTPATIENT
Start: 2021-04-15 | End: 2021-04-25

## 2021-04-15 ASSESSMENT — PATIENT HEALTH QUESTIONNAIRE - PHQ9
SUM OF ALL RESPONSES TO PHQ QUESTIONS 1-9: 0
1. LITTLE INTEREST OR PLEASURE IN DOING THINGS: 0
SUM OF ALL RESPONSES TO PHQ9 QUESTIONS 1 & 2: 0
SUM OF ALL RESPONSES TO PHQ QUESTIONS 1-9: 0

## 2021-04-15 ASSESSMENT — ENCOUNTER SYMPTOMS
DIARRHEA: 0
WHEEZING: 0
ABDOMINAL PAIN: 1
VOMITING: 0
COUGH: 0
SHORTNESS OF BREATH: 0
NAUSEA: 1
SORE THROAT: 0

## 2021-04-15 NOTE — PROGRESS NOTES
Tim Alicea  1960  04/15/21    Chief Complaint   Patient presents with    Urinary Tract Infection     burning and flank pain            Patient here c/o:    Dysuria   This is a new problem. The current episode started in the past 7 days. The problem has been unchanged. The quality of the pain is described as burning. The pain is at a severity of 6/10. There has been no fever. She is not sexually active. There is no history of pyelonephritis. Associated symptoms include flank pain, nausea and urgency. Pertinent negatives include no chills, discharge, frequency, hematuria, hesitancy, possible pregnancy, sweats or vomiting. She has tried nothing (was treated last january with cipro, did help but come back) for the symptoms. US done before was fine       Past Medical History:   Diagnosis Date    Arthritis     H/O echocardiogram 10/14/2020    EF 55-60%,  No significant valvular disease noted.     Hypertension      Past Surgical History:   Procedure Laterality Date     SECTION       Family History   Problem Relation Age of Onset    Heart Surgery Father     High Cholesterol Father     Hypertension Father     Hypertension Sister     Hypertension Brother      Social History     Socioeconomic History    Marital status: Legally      Spouse name: Not on file    Number of children: Not on file    Years of education: Not on file    Highest education level: Not on file   Occupational History    Not on file   Social Needs    Financial resource strain: Not on file    Food insecurity     Worry: Not on file     Inability: Not on file    Transportation needs     Medical: Not on file     Non-medical: Not on file   Tobacco Use    Smoking status: Never Smoker    Smokeless tobacco: Never Used   Substance and Sexual Activity    Alcohol use: No     Comment: caffeine 1 cup of coffee a day    Drug use: No    Sexual activity: Not on file   Lifestyle    Physical activity     Days per week: Not on file     Minutes per session: Not on file    Stress: Not on file   Relationships    Social connections     Talks on phone: Not on file     Gets together: Not on file     Attends [employer] service: Not on file     Active member of club or organization: Not on file     Attends meetings of clubs or organizations: Not on file     Relationship status: Not on file    Intimate partner violence     Fear of current or ex partner: Not on file     Emotionally abused: Not on file     Physically abused: Not on file     Forced sexual activity: Not on file   Other Topics Concern    Not on file   Social History Narrative    Not on file       Allergies   Allergen Reactions    Metoprolol      Pt states it gives her a HA    Naproxen      Current Outpatient Medications   Medication Sig Dispense Refill    nitrofurantoin, macrocrystal-monohydrate, (MACROBID) 100 MG capsule Take 1 capsule by mouth 2 times daily for 10 days 20 capsule 0    atenolol (TENORMIN) 50 MG tablet Take 1.5 tablets by mouth daily 30 tablet 3    aspirin 81 MG chewable tablet Take 81 mg by mouth daily      Cholecalciferol (VITAMIN D) 50 MCG (2000 UT) CAPS capsule Take by mouth      Multiple Vitamins-Minerals (THERAPEUTIC MULTIVITAMIN-MINERALS) tablet Take 1 tablet by mouth daily      atorvastatin (LIPITOR) 20 MG tablet Take 1 tablet by mouth daily 90 tablet 1    losartan (COZAAR) 50 MG tablet Take 1 tablet by mouth daily 30 tablet 2     No current facility-administered medications for this visit. Review of Systems   Constitutional: Negative for activity change, chills and fever. HENT: Negative for congestion, dental problem, ear discharge, ear pain and sore throat. Respiratory: Negative for cough, shortness of breath and wheezing. Cardiovascular: Negative for chest pain and leg swelling. Gastrointestinal: Positive for abdominal pain (lower) and nausea. Negative for diarrhea and vomiting.    Genitourinary: Positive for dysuria, flank pain and urgency. Negative for frequency, hematuria and hesitancy. Musculoskeletal: Negative for neck pain. Neurological: Negative for dizziness and headaches. Psychiatric/Behavioral: Negative for agitation. The patient is not nervous/anxious. Lab Results   Component Value Date    WBC 6.3 02/09/2021    HGB 13.2 02/09/2021    HCT 42.6 02/09/2021    MCV 90.8 02/09/2021     02/09/2021     Lab Results   Component Value Date     02/09/2021    K 4.1 02/09/2021     02/09/2021    CO2 25 02/09/2021    BUN 13 02/09/2021    CREATININE 0.8 02/09/2021    GLUCOSE 84 02/09/2021    CALCIUM 9.5 02/09/2021    PROT 8.5 (H) 01/28/2020    LABALBU 4.4 01/28/2020    BILITOT 0.3 01/28/2020    ALKPHOS 83 01/28/2020    AST 18 01/28/2020    ALT 17 01/28/2020    LABGLOM >60 02/09/2021    GFRAA >60 02/09/2021    AGRATIO 1.1 01/28/2020    GLOB 4.1 01/28/2020     Lab Results   Component Value Date    CHOL 233 (H) 09/17/2020    CHOL 254 (H) 01/28/2020     Lab Results   Component Value Date    TRIG 75 09/17/2020    TRIG 68 01/28/2020     Lab Results   Component Value Date    HDL 68 (H) 09/17/2020    HDL 81 (H) 01/28/2020     Lab Results   Component Value Date    LDLCALC 150 (H) 09/17/2020    LDLCALC 159 (H) 01/28/2020     Lab Results   Component Value Date    LABA1C 6.3 09/17/2020     Lab Results   Component Value Date    TSH 1.65 01/28/2020    TSHHS 1.250 12/17/2018         /80 (Site: Left Upper Arm, Position: Sitting, Cuff Size: Large Adult)   Pulse 78   Temp 96.9 °F (36.1 °C)   Ht 5' 6\" (1.676 m)   Wt 230 lb (104.3 kg)   SpO2 99%   BMI 37.12 kg/m²     BP Readings from Last 3 Encounters:   04/15/21 134/80   03/09/21 134/88   02/15/21 (!) 177/89       Wt Readings from Last 3 Encounters:   04/15/21 230 lb (104.3 kg)   03/09/21 232 lb (105.2 kg)   02/15/21 227 lb (103 kg)         Physical Exam  Constitutional:       General: She is not in acute distress. Appearance: Normal appearance.  She is obese. She is ill-appearing and diaphoretic. HENT:      Head: Normocephalic and atraumatic. Neck:      Musculoskeletal: Normal range of motion and neck supple. Cardiovascular:      Pulses: Normal pulses. Heart sounds: Normal heart sounds. No murmur. Pulmonary:      Effort: Pulmonary effort is normal.      Breath sounds: Normal breath sounds. No wheezing. Abdominal:      General: Abdomen is flat. Palpations: Abdomen is soft. Tenderness: There is abdominal tenderness (Suprapubic and left flank pain). There is left CVA tenderness. There is no right CVA tenderness. Musculoskeletal:      Left lower leg: Edema present. Neurological:      Mental Status: She is alert and oriented to person, place, and time. ASSESSMENT/ PLAN:    1. Dysuria  - POCT Urinalysis no Micro    2. Acute cystitis without hematuria  - mild, will try the Macrobid, if having another attack will refere her to the urology, US renal was done was negative    - nitrofurantoin, macrocrystal-monohydrate, (MACROBID) 100 MG capsule; Take 1 capsule by mouth 2 times daily for 10 days  Dispense: 20 capsule; Refill: 0              - All old blood work reviewed with the patient  - Appropriate prescription are addressed. - After visit summery provided. - Questions answered and patient verbalizes understanding.  - Call for any problem, questions, or concerns.  - RTC if symptoms worse. No follow-ups on file.

## 2021-05-04 RX ORDER — LOSARTAN POTASSIUM 50 MG/1
50 TABLET ORAL DAILY
Qty: 90 TABLET | Refills: 3 | Status: SHIPPED | OUTPATIENT
Start: 2021-05-04 | End: 2022-04-28

## 2021-05-11 ENCOUNTER — OFFICE VISIT (OUTPATIENT)
Dept: CARDIOLOGY CLINIC | Age: 61
End: 2021-05-11
Payer: MEDICARE

## 2021-05-11 VITALS
WEIGHT: 231.6 LBS | BODY MASS INDEX: 37.22 KG/M2 | SYSTOLIC BLOOD PRESSURE: 132 MMHG | HEART RATE: 64 BPM | DIASTOLIC BLOOD PRESSURE: 88 MMHG | HEIGHT: 66 IN

## 2021-05-11 DIAGNOSIS — I10 ESSENTIAL HYPERTENSION: ICD-10-CM

## 2021-05-11 DIAGNOSIS — E66.9 OBESITY (BMI 30-39.9): Primary | ICD-10-CM

## 2021-05-11 DIAGNOSIS — R06.02 SHORTNESS OF BREATH ON EXERTION: ICD-10-CM

## 2021-05-11 DIAGNOSIS — E78.2 MIXED HYPERLIPIDEMIA: ICD-10-CM

## 2021-05-11 DIAGNOSIS — R00.2 PALPITATIONS: ICD-10-CM

## 2021-05-11 PROCEDURE — G8428 CUR MEDS NOT DOCUMENT: HCPCS | Performed by: INTERNAL MEDICINE

## 2021-05-11 PROCEDURE — 99214 OFFICE O/P EST MOD 30 MIN: CPT | Performed by: INTERNAL MEDICINE

## 2021-05-11 PROCEDURE — 3017F COLORECTAL CA SCREEN DOC REV: CPT | Performed by: INTERNAL MEDICINE

## 2021-05-11 PROCEDURE — G8417 CALC BMI ABV UP PARAM F/U: HCPCS | Performed by: INTERNAL MEDICINE

## 2021-05-11 PROCEDURE — 1036F TOBACCO NON-USER: CPT | Performed by: INTERNAL MEDICINE

## 2021-05-11 PROCEDURE — 93000 ELECTROCARDIOGRAM COMPLETE: CPT | Performed by: INTERNAL MEDICINE

## 2021-05-11 RX ORDER — ATORVASTATIN CALCIUM 20 MG/1
20 TABLET, FILM COATED ORAL DAILY
Qty: 90 TABLET | Refills: 3 | Status: SHIPPED | OUTPATIENT
Start: 2021-05-11 | End: 2022-05-09 | Stop reason: SDUPTHER

## 2021-05-11 NOTE — PROGRESS NOTES
Pt denies any new cardiac sx, no surgeries or procedures scheduled that she is aware of and will need refills.

## 2021-05-11 NOTE — PROGRESS NOTES
Dede Minor MD                                  CARDIOLOGY  NOTE       Chief Complaint:    Follow up palpitations     Rare Flutter sensation in the chest   No chest pain   Shortness of breath - improved      [facility] 2/15/2021      Conclusions     Normal coronary angiogram   Normal LVEF with no WMA      Recommendations   Cont medical management   Risk Factor Modifications        Stress Test 1/13/2021    Ulis Curio Dr. Arletha Pallas .    Small sized defect of moderate severity which is persistent involving    anterior wall of myocardium likely secondary to breast artifact however    anterior wall infarct can not be ruled out. No reversible defect noted.    Normal EF 77 % with normal ventricular contractility.        Recommendation    Probably normal Stress MPI with anterior wall breast attenuation.    Normal wall motion with preserved LVEF    Discuss the results with patient as outpatient         ECHOCARDIOGRAM 10/14/2020     Summary   Left ventricular function is normal, EF is estimated at 55-60%. Mild left ventricular hypertrophy. E/A reversal; indeterminate diastolic function. Mild tricuspid regurgitation. No significant valvular disease noted. Left ventricular function is normal, EF is estimated at 55-60%. Mild left ventricular hypertrophy. E/A reversal; indeterminate diastolic function. No evidence of pericardial effusion. HOLTER  MONITOR 10/1/2020     Patient Name: Reina Bazzi   Medical Record Number: [id number]  Date: 10/12/2020     62 y.o. 1960     INDICATIONS:  Palpitations     FINDINGS:  The patient had predominantly sinus rhythm. Heart rate varied from 48 bpm to 99 bpm.  The patients average heart rate was 67 bpm.    The patient had a holter monitor tracing done for 48 hour(s)  The patient had 3195 ventricular ectopic activity. All single beats, no runs noted. 1.75 % total burden.    The patient had 48 supraventricular ectopy.    The longest R/R interval was 1.42 seconds. No other major arrhythmia was noted.     Events of fluttering in chest correspond to Vent. Ectopies and Normal Sinus Rhythm      CONCLUSION:    Normal Sinus Rhythm   Rare Ventricular Ectopies with burden of 1.75%     RECOMMENDATIONS:      Medical Management. Recent Cardiac HPI:     Mandy Acevedo is a 62y.o. year old female with past medical history significant for hypertension, back pain presents to the clinic with a chief complaint of fluttering sensation in the chest.  According to the patient, she has very frequent fluttering sensation originating in the abdomen/epigastrium and radiating up the chest.  The frequency of the sensations is every 1-2 hours the usually last for few minutes. Patient denies any presyncope syncope. Patient denies any particular chest pain. Patient denies palpitations  She does complain of occasional shortness of breath and leg edema    Patient has been hypertensive and has been maintained on atenolol 50 mg twice a day. Recently she was started on lisinopril 10 mg by PCP however patient did not take due to dizziness. In the past patient was maintained on amlodipine but due to leg edema she stopped that. She was also on hydrochlorothiazide and she stopped that secondary to hair loss. In the office today her blood pressure is 140/80. Does mention that she checks her blood pressure at home 2-3 times a day and usually her blood pressure is in the 130s to 739W range systolic.     Patient denies any further complaints    EKG office today shows normal sinus rhythm          Current Outpatient Medications   Medication Sig Dispense Refill    atenolol (TENORMIN) 50 MG tablet TAKE 1 TABLET BY MOUTH TWICE DAILY 180 tablet 3    losartan (COZAAR) 50 MG tablet Take 1 tablet by mouth daily 90 tablet 3    Cholecalciferol (VITAMIN D) 50 MCG (2000 UT) CAPS capsule Take by mouth      Multiple Vitamins-Minerals (THERAPEUTIC MULTIVITAMIN-MINERALS) tablet Take 1 tablet by mouth daily      atorvastatin (LIPITOR) 20 MG tablet Take 1 tablet by mouth daily 90 tablet 1    aspirin 81 MG chewable tablet Take 81 mg by mouth daily       No current facility-administered medications for this visit. Allergies:     Metoprolol and Naproxen    Patient History:    Past Medical History:   Diagnosis Date    Arthritis     H/O echocardiogram 10/14/2020    EF 55-60%,  No significant valvular disease noted.  Hypertension      Past Surgical History:   Procedure Laterality Date     SECTION       Family History   Problem Relation Age of Onset    Heart Surgery Father     High Cholesterol Father     Hypertension Father     Hypertension Sister     Hypertension Brother      Social History     Tobacco Use    Smoking status: Never Smoker    Smokeless tobacco: Never Used   Substance Use Topics    Alcohol use: No     Comment: caffeine 1 cup of coffee a day        Review of Systems:     · Constitutional:  No Fever or Weight Loss   · Eyes: No Decreased Vision  · ENT: No Headaches, Hearing Loss or Vertigo  · Cardiovascular: No Chest Pain,  + Shortness of breath, No Palpitations. No Edema. Fluttering sensation in the chest  · Respiratory: No cough or wheezing .  No Respiratory distress   · Gastrointestinal: No abdominal pain, appetite loss, blood in stools, constipation, diarrhea or heartburn  · Genitourinary: No dysuria, trouble voiding, or hematuria  · Musculoskeletal:  denies any new  joint aches , or pain   · Integumentary: No rash or pruritis  · Neurological: No TIA or stroke symptoms  · Psychiatric: No anxiety or depression  · Endocrine: No malaise, fatigue or temperature intolerance  · Hematologic/Lymphatic: No bleeding problems, blood clots or swollen lymph nodes  · Allergic/Immunologic: No nasal congestion or hives        Objective:      Physical Exam:    /88   Pulse 64   Ht 5' 6\" (1.676 m)   Wt 231 lb 9.6 oz (105.1 kg)   BMI 37.38 kg/m²   Wt Readings from Last 3 Encounters: 05/11/21 231 lb 9.6 oz (105.1 kg)   04/15/21 230 lb (104.3 kg)   03/09/21 232 lb (105.2 kg)     Body mass index is 37.38 kg/m². Vitals:    05/11/21 0805   BP: 132/88   Pulse: 64        General Appearance and Constitutional: Conversant, Well developed, Well nourished, No acute distress, Non-toxic appearance. HEENT:  Normocephalic, Atraumatic, Bilateral external ears normal, Oropharynx moist, No oral exudates,   Nose normal.   Neck- Normal range of motion, No tenderness, Supple  Eyes:  Conjunctiva normal, No discharge. Respiratory:  Normal breath sounds, No respiratory distress, No wheezing, No Rales, No Ronchi. No chest tenderness. Cardiovascular: S1-S2, no added heart sounds, No Mumurs appreciated. No gallops, rubs. No Pedal Edema   GI:  Bowel sounds normal, Soft, No tenderness,  :  No costovertebral angle tenderness   Musculoskeletal:  No gross deformities.  Back- No tenderness  Integument:  Well hydrated, no rash   Lymphatic:  No lymphadenopathy noted   Neurologic:  Alert & oriented x 3, Normal motor function, normal sensory function, no focal deficits noted   Psychiatric:  Speech and behavior appropriate       Medical decision making and Data review:    DATA:    Lab Results   Component Value Date    TROPONINT <0.010 12/17/2018     BNP:  No results found for: PROBNP  PT/INR:  No results found for: PTINR  Lab Results   Component Value Date    LABA1C 6.3 09/17/2020    LABA1C 6.2 12/17/2019     Lab Results   Component Value Date    CHOL 233 (H) 09/17/2020    TRIG 75 09/17/2020    HDL 68 (H) 09/17/2020    LDLCALC 150 (H) 09/17/2020     Lab Results   Component Value Date    WBC 6.3 02/09/2021    HGB 13.2 02/09/2021    HCT 42.6 02/09/2021    MCV 90.8 02/09/2021     02/09/2021     TSH:   Lab Results   Component Value Date    TSH 1.65 01/28/2020     Lab Results   Component Value Date    AST 18 01/28/2020    ALT 17 01/28/2020    BILITOT 0.3 01/28/2020    ALKPHOS 83 01/28/2020         All labs, medications

## 2021-08-05 ENCOUNTER — OFFICE VISIT (OUTPATIENT)
Dept: FAMILY MEDICINE CLINIC | Age: 61
End: 2021-08-05
Payer: MEDICARE

## 2021-08-05 VITALS
SYSTOLIC BLOOD PRESSURE: 121 MMHG | DIASTOLIC BLOOD PRESSURE: 81 MMHG | OXYGEN SATURATION: 99 % | HEART RATE: 62 BPM | WEIGHT: 231.8 LBS | BODY MASS INDEX: 37.41 KG/M2

## 2021-08-05 DIAGNOSIS — I10 ESSENTIAL HYPERTENSION: Primary | ICD-10-CM

## 2021-08-05 DIAGNOSIS — Z12.11 COLON CANCER SCREENING: ICD-10-CM

## 2021-08-05 DIAGNOSIS — E78.2 MIXED HYPERLIPIDEMIA: ICD-10-CM

## 2021-08-05 DIAGNOSIS — R73.03 PREDIABETES: ICD-10-CM

## 2021-08-05 LAB — HBA1C MFR BLD: 5.9 %

## 2021-08-05 PROCEDURE — 83036 HEMOGLOBIN GLYCOSYLATED A1C: CPT | Performed by: FAMILY MEDICINE

## 2021-08-05 PROCEDURE — 3017F COLORECTAL CA SCREEN DOC REV: CPT | Performed by: FAMILY MEDICINE

## 2021-08-05 PROCEDURE — G8417 CALC BMI ABV UP PARAM F/U: HCPCS | Performed by: FAMILY MEDICINE

## 2021-08-05 PROCEDURE — G8427 DOCREV CUR MEDS BY ELIG CLIN: HCPCS | Performed by: FAMILY MEDICINE

## 2021-08-05 PROCEDURE — 99214 OFFICE O/P EST MOD 30 MIN: CPT | Performed by: FAMILY MEDICINE

## 2021-08-05 PROCEDURE — 1036F TOBACCO NON-USER: CPT | Performed by: FAMILY MEDICINE

## 2021-08-05 ASSESSMENT — ENCOUNTER SYMPTOMS
BACK PAIN: 0
SHORTNESS OF BREATH: 0
ABDOMINAL PAIN: 0
COUGH: 0

## 2021-08-05 NOTE — PROGRESS NOTES
Anamika Skinner  1960  08/05/21    Chief Complaint   Patient presents with    6 Month Follow-Up    Hypertension    Hyperlipidemia    Other     prediabetes           Patient here for 6 months f/u regarding HTN, HLD, prediabetic, The patient is taking hypertensive medications compliantly without side effects. Denies chest pain, dyspnea, edema, or TIA's, didn't loss wt.,  She is taking cholesterol medication with no side effects and watch diet for her BS. Past Medical History:   Diagnosis Date    Arthritis     H/O echocardiogram 10/14/2020    EF 55-60%,  No significant valvular disease noted.  Hypertension      Past Surgical History:   Procedure Laterality Date     SECTION       Family History   Problem Relation Age of Onset    Heart Surgery Father     High Cholesterol Father     Hypertension Father     Hypertension Sister     Hypertension Brother      Social History     Socioeconomic History    Marital status: Legally      Spouse name: Not on file    Number of children: Not on file    Years of education: Not on file    Highest education level: Not on file   Occupational History    Not on file   Tobacco Use    Smoking status: Never Smoker    Smokeless tobacco: Never Used   Vaping Use    Vaping Use: Never used   Substance and Sexual Activity    Alcohol use: No     Comment: caffeine 1 cup of coffee a day    Drug use: No    Sexual activity: Not on file   Other Topics Concern    Not on file   Social History Narrative    Not on file     Social Determinants of Health     Financial Resource Strain:     Difficulty of Paying Living Expenses:    Food Insecurity:     Worried About Running Out of Food in the Last Year:     Ran Out of Food in the Last Year:    Transportation Needs:     Lack of Transportation (Medical):      Lack of Transportation (Non-Medical):    Physical Activity:     Days of Exercise per Week:     Minutes of Exercise per Session:    Stress:  Feeling of Stress :    Social Connections:     Frequency of Communication with Friends and Family:     Frequency of Social Gatherings with Friends and Family:     Attends [employer] Services:     Active Member of Clubs or Organizations:     Attends Club or Organization Meetings:     Marital Status:    Intimate Partner Violence:     Fear of Current or Ex-Partner:     Emotionally Abused:     Physically Abused:     Sexually Abused: Allergies   Allergen Reactions    Metoprolol      Pt states it gives her a HA    Naproxen      Current Outpatient Medications   Medication Sig Dispense Refill    atorvastatin (LIPITOR) 20 MG tablet Take 1 tablet by mouth daily 90 tablet 3    atenolol (TENORMIN) 50 MG tablet TAKE 1 TABLET BY MOUTH TWICE DAILY 180 tablet 3    aspirin 81 MG chewable tablet Take 81 mg by mouth daily      Cholecalciferol (VITAMIN D) 50 MCG (2000 UT) CAPS capsule Take by mouth      Multiple Vitamins-Minerals (THERAPEUTIC MULTIVITAMIN-MINERALS) tablet Take 1 tablet by mouth daily      losartan (COZAAR) 50 MG tablet Take 1 tablet by mouth daily 90 tablet 3     No current facility-administered medications for this visit. Review of Systems   Constitutional: Negative for activity change, chills and fever. Respiratory: Negative for cough and shortness of breath. Cardiovascular: Negative for chest pain and leg swelling. Gastrointestinal: Negative for abdominal pain. Genitourinary: Negative for dysuria and flank pain. Musculoskeletal: Negative for back pain. Skin: Negative for rash. Neurological: Negative for dizziness and headaches. Psychiatric/Behavioral: Negative for agitation and sleep disturbance. The patient is not nervous/anxious.         Lab Results   Component Value Date    WBC 6.3 02/09/2021    HGB 13.2 02/09/2021    HCT 42.6 02/09/2021    MCV 90.8 02/09/2021     02/09/2021     Lab Results   Component Value Date     02/09/2021    K 4.1 02/09/2021  02/09/2021    CO2 25 02/09/2021    BUN 13 02/09/2021    CREATININE 0.8 02/09/2021    GLUCOSE 84 02/09/2021    CALCIUM 9.5 02/09/2021    PROT 8.5 (H) 01/28/2020    LABALBU 4.4 01/28/2020    BILITOT 0.3 01/28/2020    ALKPHOS 83 01/28/2020    AST 18 01/28/2020    ALT 17 01/28/2020    LABGLOM >60 02/09/2021    GFRAA >60 02/09/2021    AGRATIO 1.1 01/28/2020    GLOB 4.1 01/28/2020     Lab Results   Component Value Date    CHOL 233 (H) 09/17/2020    CHOL 254 (H) 01/28/2020     Lab Results   Component Value Date    TRIG 75 09/17/2020    TRIG 68 01/28/2020     Lab Results   Component Value Date    HDL 68 (H) 09/17/2020    HDL 81 (H) 01/28/2020     Lab Results   Component Value Date    LDLCALC 150 (H) 09/17/2020    LDLCALC 159 (H) 01/28/2020     Lab Results   Component Value Date    LABA1C 5.9 08/05/2021     Lab Results   Component Value Date    TSH 1.65 01/28/2020    TSHHS 1.250 12/17/2018         /81 (Site: Left Upper Arm, Position: Sitting, Cuff Size: Large Adult)   Pulse 62   Wt 231 lb 12.8 oz (105.1 kg)   SpO2 99%   BMI 37.41 kg/m²     BP Readings from Last 3 Encounters:   08/05/21 121/81   05/11/21 132/88   04/15/21 134/80       Wt Readings from Last 3 Encounters:   08/05/21 231 lb 12.8 oz (105.1 kg)   05/11/21 231 lb 9.6 oz (105.1 kg)   04/15/21 230 lb (104.3 kg)         Physical Exam  Constitutional:       General: She is not in acute distress. Appearance: Normal appearance. She is obese. She is not ill-appearing. HENT:      Head: Normocephalic and atraumatic. Eyes:      General: No scleral icterus. Pupils: Pupils are equal, round, and reactive to light. Cardiovascular:      Rate and Rhythm: Normal rate and regular rhythm. Heart sounds: Normal heart sounds. No murmur heard. Pulmonary:      Effort: Pulmonary effort is normal.      Breath sounds: Normal breath sounds. No wheezing. Musculoskeletal:         General: Normal range of motion.       Cervical back: Normal range of motion and neck supple. No rigidity. Right lower leg: No edema. Neurological:      General: No focal deficit present. Mental Status: She is alert and oriented to person, place, and time. Psychiatric:         Mood and Affect: Mood normal.         Behavior: Behavior normal.         ASSESSMENT/ PLAN:    1. Essential hypertension  - stable    2. Mixed hyperlipidemia  - stable    3. Prediabetes  - getting better  - POCT glycosylated hemoglobin (Hb A1C)    4. Colon cancer screening  - Ambulatory referral to Gastroenterology  - POCT Fecal Immunochemical Test (FIT); Future      Going to see the urology next wk for dysria        - All old blood work reviewed with the patient  - Appropriate prescription are addressed. - After visit summery provided. - Questions answered and patient verbalizes understanding.  - Call for any problem, questions, or concerns. Return in about 6 months (around 2/5/2022).

## 2021-08-06 ENCOUNTER — TELEPHONE (OUTPATIENT)
Dept: FAMILY MEDICINE CLINIC | Age: 61
End: 2021-08-06

## 2021-08-06 DIAGNOSIS — Z12.11 COLON CANCER SCREENING: ICD-10-CM

## 2021-08-06 LAB
CONTROL: ABNORMAL
HEMOCCULT STL QL: ABNORMAL

## 2021-08-06 PROCEDURE — 82274 ASSAY TEST FOR BLOOD FECAL: CPT | Performed by: FAMILY MEDICINE

## 2021-10-19 ENCOUNTER — OFFICE VISIT (OUTPATIENT)
Dept: OBGYN | Age: 61
End: 2021-10-19
Payer: MEDICARE

## 2021-10-19 ENCOUNTER — HOSPITAL ENCOUNTER (OUTPATIENT)
Age: 61
Setting detail: SPECIMEN
Discharge: HOME | End: 2021-10-19
Payer: MEDICARE

## 2021-10-19 VITALS
DIASTOLIC BLOOD PRESSURE: 81 MMHG | HEART RATE: 63 BPM | WEIGHT: 230 LBS | BODY MASS INDEX: 36.96 KG/M2 | HEIGHT: 66 IN | SYSTOLIC BLOOD PRESSURE: 169 MMHG

## 2021-10-19 DIAGNOSIS — N39.0 RECURRENT UTI: ICD-10-CM

## 2021-10-19 DIAGNOSIS — Z01.419 ENCOUNTER FOR ANNUAL ROUTINE GYNECOLOGICAL EXAMINATION: Primary | ICD-10-CM

## 2021-10-19 DIAGNOSIS — Z11.51 SPECIAL SCREENING EXAMINATION FOR HUMAN PAPILLOMAVIRUS (HPV): ICD-10-CM

## 2021-10-19 PROCEDURE — 88142 CYTOPATH C/V THIN LAYER: CPT

## 2021-10-19 PROCEDURE — 99386 PREV VISIT NEW AGE 40-64: CPT | Performed by: OBSTETRICS & GYNECOLOGY

## 2021-10-19 PROCEDURE — 87624 HPV HI-RISK TYP POOLED RSLT: CPT

## 2021-10-19 RX ORDER — NITROFURANTOIN MACROCRYSTALS 50 MG/1
50 CAPSULE ORAL NIGHTLY
Qty: 30 CAPSULE | Refills: 11 | Status: SHIPPED | OUTPATIENT
Start: 2021-10-19 | End: 2021-11-18

## 2021-10-19 SDOH — ECONOMIC STABILITY: FOOD INSECURITY: WITHIN THE PAST 12 MONTHS, YOU WORRIED THAT YOUR FOOD WOULD RUN OUT BEFORE YOU GOT MONEY TO BUY MORE.: NEVER TRUE

## 2021-10-19 SDOH — ECONOMIC STABILITY: FOOD INSECURITY: WITHIN THE PAST 12 MONTHS, THE FOOD YOU BOUGHT JUST DIDN'T LAST AND YOU DIDN'T HAVE MONEY TO GET MORE.: NEVER TRUE

## 2021-10-19 ASSESSMENT — ENCOUNTER SYMPTOMS
GASTROINTESTINAL NEGATIVE: 1
ALLERGIC/IMMUNOLOGIC NEGATIVE: 1
EYES NEGATIVE: 1
RESPIRATORY NEGATIVE: 1

## 2021-10-19 ASSESSMENT — SOCIAL DETERMINANTS OF HEALTH (SDOH): HOW HARD IS IT FOR YOU TO PAY FOR THE VERY BASICS LIKE FOOD, HOUSING, MEDICAL CARE, AND HEATING?: NOT VERY HARD

## 2021-10-19 NOTE — PROGRESS NOTES
10/19/21    Marcos Ramirez Tara  1960    Chief Complaint   Patient presents with    Gynecologic Exam     no c/o. [facility], [facility] -2021 @ [facility]. The patient is a 62 y.o. female,  who presents for her annual exam.  She is menopausal.  She is not taking HRT. Jaylan Smith She is  sexually active. She reports additional symptoms of recurrent uti, sees urology. Pap smear history: Her last PAP smear was in . Her results were normal.    Breast history: her most recent mammogram was in . The results were: Normal    Osteoporosis Status: her bone density scan was in . The results were normal bone density    Colonoscopy Status: she had a colonoscopy in 12 years prior. The results were normal.    Past Medical History:   Diagnosis Date    Abnormal Pap smear of cervix     Arthritis     H/O echocardiogram 10/14/2020    EF 55-60%,  No significant valvular disease noted.     Heart disease     Hypertension     Osteoarthritis        Past Surgical History:   Procedure Laterality Date     SECTION      TUBAL LIGATION         Family History   Problem Relation Age of Onset    Heart Surgery Father     High Cholesterol Father     Hypertension Father     Hypertension Sister     Hypertension Brother     Dementia Mother     Hypertension Sister        Social History     Tobacco Use    Smoking status: Never Smoker    Smokeless tobacco: Never Used   Vaping Use    Vaping Use: Never used   Substance Use Topics    Alcohol use: No     Comment: caffeine 1 cup of coffee a day    Drug use: No       Current Outpatient Medications   Medication Sig Dispense Refill    nitrofurantoin (MACRODANTIN) 50 MG capsule Take 1 capsule by mouth nightly 30 capsule 11    atenolol (TENORMIN) 50 MG tablet TAKE 1 TABLET BY MOUTH TWICE DAILY 180 tablet 3    aspirin 81 MG chewable tablet Take 81 mg by mouth daily      Cholecalciferol (VITAMIN D) 50 MCG ( UT) CAPS capsule Take by mouth      Multiple Vitamins-Minerals (THERAPEUTIC MULTIVITAMIN-MINERALS) tablet Take 1 tablet by mouth daily      atorvastatin (LIPITOR) 20 MG tablet Take 1 tablet by mouth daily (Patient not taking: Reported on 10/19/2021) 90 tablet 3    losartan (COZAAR) 50 MG tablet Take 1 tablet by mouth daily 90 tablet 3     No current facility-administered medications for this visit. Allergies   Allergen Reactions    Metoprolol      Pt states it gives her a HA    Naproxen              There is no immunization history on file for this patient. Review of Systems   Constitutional: Negative. Eyes: Negative. Respiratory: Negative. Cardiovascular: Negative. Gastrointestinal: Negative. Endocrine: Negative. Genitourinary: Negative. Musculoskeletal: Negative. Skin: Negative. Allergic/Immunologic: Negative. Neurological: Negative. Hematological: Negative. Psychiatric/Behavioral: Negative. BP (!) 169/81   Pulse 63   Ht 5' 6\" (1.676 m)   Wt 230 lb (104.3 kg)   BMI 37.12 kg/m²     Physical Exam  Exam conducted with a chaperone present. Constitutional:       Appearance: Normal appearance. HENT:      Head: Normocephalic and atraumatic. Nose: Nose normal.   Eyes:      Conjunctiva/sclera: Conjunctivae normal.   Cardiovascular:      Rate and Rhythm: Normal rate. Pulses: Normal pulses. Pulmonary:      Effort: Pulmonary effort is normal.   Chest:      Breasts:         Right: Normal.         Left: Normal.   Abdominal:      General: Abdomen is flat. Bowel sounds are normal.      Palpations: Abdomen is soft. Hernia: There is no hernia in the left inguinal area or right inguinal area. Genitourinary:     General: Normal vulva. Exam position: Lithotomy position. Labia:         Right: No rash, tenderness or lesion. Left: No rash, tenderness or lesion. Urethra: No prolapse. Vagina: Normal. No foreign body.  No vaginal discharge, erythema, tenderness, bleeding, lesions or prolapsed vaginal walls. Cervix: No cervical motion tenderness, discharge, friability, lesion, erythema or cervical bleeding. Uterus: Normal. Not enlarged, not tender and no uterine prolapse. Adnexa: Right adnexa normal and left adnexa normal.        Right: No mass, tenderness or fullness. Left: No mass, tenderness or fullness. Musculoskeletal:      Cervical back: Normal range of motion and neck supple. Lymphadenopathy:      Upper Body:      Right upper body: No supraclavicular, axillary or pectoral adenopathy. Left upper body: No supraclavicular, axillary or pectoral adenopathy. Skin:     General: Skin is warm. Coloration: Skin is not ashen or cyanotic. Findings: No abrasion, abscess or bruising. Rash is not crusting or macular. Neurological:      Mental Status: She is alert and oriented to person, place, and time. No results found for this visit on 10/19/21. Assessment and Plan   Diagnosis Orders   1. Encounter for annual routine gynecological examination  PAP SMEAR    Ambulatory referral to Gastroenterology   2. Special screening examination for human papillomavirus (HPV)  PAP SMEAR   3. Recurrent UTI  nitrofurantoin (MACRODANTIN) 50 MG capsule    Culture, Urine    Urinalysis with Microscopic     Continue to see urology, consider vaginal estrogen cream (declines today)  Return in about 1 year (around 10/19/2022).     Daylene Hodgkins, MD

## 2021-10-20 LAB
AMORPHOUS: ABNORMAL /HPF
BILIRUBIN URINE: NEGATIVE
BLOOD, URINE: NEGATIVE
CLARITY: CLEAR
COLOR: YELLOW
EPITHELIAL CELLS, UA: 6 /HPF (ref 0–5)
GLUCOSE URINE: NEGATIVE MG/DL
HYALINE CASTS: 0 /LPF (ref 0–8)
KETONES, URINE: NEGATIVE MG/DL
LEUKOCYTE ESTERASE, URINE: NEGATIVE
MICROSCOPIC EXAMINATION: ABNORMAL
NITRITE, URINE: NEGATIVE
PH UA: 6.5 (ref 5–8)
PROTEIN UA: NEGATIVE MG/DL
RBC UA: 0 /HPF (ref 0–4)
SPECIFIC GRAVITY UA: 1.02 (ref 1–1.03)
URINE CULTURE, ROUTINE: NORMAL
URINE TYPE: ABNORMAL
UROBILINOGEN, URINE: 0.2 E.U./DL
WBC UA: 1 /HPF (ref 0–5)

## 2021-10-26 ENCOUNTER — TELEPHONE (OUTPATIENT)
Dept: GASTROENTEROLOGY | Age: 61
End: 2021-10-26

## 2021-10-26 LAB
HPV HIGH RISK: DETECTED
HPV, GENOTYPE 16: NOT DETECTED
HPV, GENOTYPE 18: NOT DETECTED

## 2021-10-28 ENCOUNTER — OFFICE VISIT (OUTPATIENT)
Dept: FAMILY MEDICINE CLINIC | Age: 61
End: 2021-10-28
Payer: MEDICARE

## 2021-10-28 VITALS
WEIGHT: 228.8 LBS | DIASTOLIC BLOOD PRESSURE: 80 MMHG | HEART RATE: 67 BPM | OXYGEN SATURATION: 99 % | BODY MASS INDEX: 36.77 KG/M2 | SYSTOLIC BLOOD PRESSURE: 148 MMHG | HEIGHT: 66 IN

## 2021-10-28 DIAGNOSIS — Z00.00 ROUTINE GENERAL MEDICAL EXAMINATION AT A HEALTH CARE FACILITY: Primary | ICD-10-CM

## 2021-10-28 DIAGNOSIS — E78.2 MIXED HYPERLIPIDEMIA: ICD-10-CM

## 2021-10-28 PROCEDURE — 3017F COLORECTAL CA SCREEN DOC REV: CPT | Performed by: FAMILY MEDICINE

## 2021-10-28 PROCEDURE — G0438 PPPS, INITIAL VISIT: HCPCS | Performed by: FAMILY MEDICINE

## 2021-10-28 ASSESSMENT — PATIENT HEALTH QUESTIONNAIRE - PHQ9
SUM OF ALL RESPONSES TO PHQ9 QUESTIONS 1 & 2: 0
SUM OF ALL RESPONSES TO PHQ QUESTIONS 1-9: 0
2. FEELING DOWN, DEPRESSED OR HOPELESS: 0
SUM OF ALL RESPONSES TO PHQ QUESTIONS 1-9: 0
SUM OF ALL RESPONSES TO PHQ QUESTIONS 1-9: 0
1. LITTLE INTEREST OR PLEASURE IN DOING THINGS: 0

## 2021-10-28 ASSESSMENT — LIFESTYLE VARIABLES: HOW OFTEN DO YOU HAVE A DRINK CONTAINING ALCOHOL: 0

## 2021-10-28 NOTE — PROGRESS NOTES
Medicare Annual Wellness Visit  Name: Micah Rich Date: 10/28/2021   MRN: [id number] Sex: Female   Age: 62 y.o. Ethnicity: Non- / Non    : 1960 Race: Yves Mathis / Venecia Coe is here for [employer]    Screenings for behavioral, psychosocial and functional/safety risks, and cognitive dysfunction are all negative except as indicated below. These results, as well as other patient data from the [address] form, are documented in Flowsheets linked to this Encounter. Allergies   Allergen Reactions    Metoprolol      Pt states it gives her a HA    Naproxen          Prior to Visit Medications    Medication Sig Taking? Authorizing Provider   nitrofurantoin (MACRODANTIN) 50 MG capsule Take 1 capsule by mouth nightly Yes Estella Woo MD   atenolol (TENORMIN) 50 MG tablet TAKE 1 TABLET BY MOUTH TWICE DAILY Yes Parker Broussard MD   aspirin 81 MG chewable tablet Take 81 mg by mouth daily Yes Historical Provider, MD   Cholecalciferol (VITAMIN D) 50 MCG (2000) CAPS capsule Take by mouth Yes Historical Provider, MD   Multiple Vitamins-Minerals (THERAPEUTIC MULTIVITAMIN-MINERALS) tablet Take 1 tablet by mouth daily Yes Historical Provider, MD   atorvastatin (LIPITOR) 20 MG tablet Take 1 tablet by mouth daily  Patient not taking: Reported on 10/19/2021  Alfonso Mckenzie MD   losartan (COZAAR) 50 MG tablet Take 1 tablet by mouth daily  Alfonso Mckenzie MD         Past Medical History:   Diagnosis Date    Abnormal Pap smear of cervix     Arthritis     H/O echocardiogram 10/14/2020    EF 55-60%,  No significant valvular disease noted.     Heart disease     Hypertension     Osteoarthritis        Past Surgical History:   Procedure Laterality Date     SECTION      TUBAL LIGATION           Family History   Problem Relation Age of Onset    Heart Surgery Father     High Cholesterol Father     Hypertension Father    [facility] Hypertension Sister     Hypertension Brother     Dementia Mother     Hypertension Sister        CareTeam (Including outside providers/suppliers regularly involved in providing care):   Patient Care Team:  Elvia Shaffer MD as PCP - General (Family Medicine)  Elvia Shaffer MD as PCP - [facility]  Alena Simmons MD as Consulting Physician (Obstetrics & Gynecology)    [facility] Readings from Last 3 Encounters:   10/28/21 228 lb 12.8 oz (103.8 kg)   10/19/21 230 lb (104.3 kg)   08/05/21 231 lb 12.8 oz (105.1 kg)     Vitals:    10/28/21 0819   BP: (!) 148/80   Site: Left Upper Arm   Position: Sitting   Cuff Size: Large Adult   Pulse: 67   SpO2: 99%   Weight: 228 lb 12.8 oz (103.8 kg)   Height: 5' 6\" (1.676 m)     Body mass index is 36.93 kg/m². Based upon direct observation of the patient, evaluation of cognition reveals recent and remote memory intact. Patient's complete Health Risk Assessment and screening values have been reviewed and are found in Flowsheets. The following problems were reviewed today and where indicated follow up appointments were made and/or referrals ordered. Positive Risk Factor Screenings with Interventions:            General Health and ACP:  General  In general, how would you say your health is?: Good  In the past 7 days, have you experienced any of the following?  New or Increased Pain, New or Increased Fatigue, Loneliness, Social Isolation, Stress or Anger?: (!) New or Increased Pain  Do you get the social and emotional support that you need?: Yes  Do you have a Living Will?: (!) No  Advance Directives     Power of  Living Will ACP-Advance Directive ACP-Power of     Not on File Not on File Not on File Not on File      General Health Risk Interventions:  · need to work on the living will    Health Habits/Nutrition:  Health Habits/Nutrition  Do you exercise for at least 20 minutes 2-3 times per week?: Yes  Have you lost any weight without trying in the past 3 months?: No  Do you eat only one meal per day?: No  Have you seen the dentist within the past year?: (!) No  Body mass index: (!) 36.93  Health Habits/Nutrition Interventions:  · doing fine, has upper and lower denture    Hearing/Vision:  No exam data present  Hearing/Vision  Do you or your family notice any trouble with your hearing that hasn't been managed with hearing aids?: No  Do you have difficulty driving, watching TV, or doing any of your daily activities because of your eyesight?: (!) Yes  Have you had an eye exam within the past year?: (!) No  Hearing/Vision Interventions:  · doing okay      Personalized Preventive Plan   Current Health Maintenance Status    There is no immunization history on file for this patient. Health Maintenance   Topic Date Due    Hepatitis C screen  Never done    COVID-19 Vaccine (1) Never done    HIV screen  Never done    DTaP/Tdap/Td vaccine (1 - Tdap) Never done    Shingles Vaccine (1 of 2) Never done    Flu vaccine (1) Never done    Lipid screen  09/17/2021    Annual Wellness Visit (AWV)  09/18/2021    Potassium monitoring  02/09/2022    Creatinine monitoring  02/09/2022    A1C test (Diabetic or Prediabetic)  08/05/2022    Colon Cancer Screen FIT/FOBT  08/06/2022    Breast cancer screen  09/17/2023    Cervical cancer screen  10/19/2026    Hepatitis A vaccine  Aged Out    Hepatitis B vaccine  Aged Out    Hib vaccine  Aged Out    Meningococcal (ACWY) vaccine  Aged Out    Pneumococcal 0-64 years Vaccine  Aged Out     Recommendations for Noxilizer Due: see orders and patient instructions/AVS.  . Recommended screening schedule for the next 5-10 years is provided to the patient in written form: see Patient Instructions/AVS.    Gary He was seen today for medicare awv. Diagnoses and all orders for this visit:    Routine general medical examination at a health care facility    Mixed hyperlipidemia  -     Lipid Panel;  Future Patient refuse the flu shot, patient going to see the GI for colonoscopy

## 2021-11-03 ENCOUNTER — TELEPHONE (OUTPATIENT)
Dept: GASTROENTEROLOGY | Age: 61
End: 2021-11-03

## 2021-11-10 ENCOUNTER — TELEPHONE (OUTPATIENT)
Dept: GASTROENTEROLOGY | Age: 61
End: 2021-11-10

## 2022-01-04 ENCOUNTER — OFFICE VISIT (OUTPATIENT)
Dept: CARDIOLOGY CLINIC | Age: 62
End: 2022-01-04
Payer: MEDICARE

## 2022-01-04 VITALS
SYSTOLIC BLOOD PRESSURE: 126 MMHG | BODY MASS INDEX: 37.57 KG/M2 | WEIGHT: 233.8 LBS | HEIGHT: 66 IN | HEART RATE: 75 BPM | DIASTOLIC BLOOD PRESSURE: 82 MMHG

## 2022-01-04 DIAGNOSIS — I20.8 OTHER FORMS OF ANGINA PECTORIS (HCC): ICD-10-CM

## 2022-01-04 DIAGNOSIS — E78.5 DYSLIPIDEMIA: ICD-10-CM

## 2022-01-04 DIAGNOSIS — R07.9 CHEST PAIN, UNSPECIFIED TYPE: ICD-10-CM

## 2022-01-04 DIAGNOSIS — R00.2 PALPITATIONS: Primary | ICD-10-CM

## 2022-01-04 DIAGNOSIS — I10 ESSENTIAL HYPERTENSION: ICD-10-CM

## 2022-01-04 DIAGNOSIS — E66.9 OBESITY (BMI 30-39.9): ICD-10-CM

## 2022-01-04 PROCEDURE — G8484 FLU IMMUNIZE NO ADMIN: HCPCS | Performed by: INTERNAL MEDICINE

## 2022-01-04 PROCEDURE — 93000 ELECTROCARDIOGRAM COMPLETE: CPT | Performed by: INTERNAL MEDICINE

## 2022-01-04 PROCEDURE — G9899 SCRN MAM PERF RSLTS DOC: HCPCS | Performed by: INTERNAL MEDICINE

## 2022-01-04 PROCEDURE — G8417 CALC BMI ABV UP PARAM F/U: HCPCS | Performed by: INTERNAL MEDICINE

## 2022-01-04 PROCEDURE — 1036F TOBACCO NON-USER: CPT | Performed by: INTERNAL MEDICINE

## 2022-01-04 PROCEDURE — 99214 OFFICE O/P EST MOD 30 MIN: CPT | Performed by: INTERNAL MEDICINE

## 2022-01-04 PROCEDURE — G8428 CUR MEDS NOT DOCUMENT: HCPCS | Performed by: INTERNAL MEDICINE

## 2022-01-04 PROCEDURE — 3017F COLORECTAL CA SCREEN DOC REV: CPT | Performed by: INTERNAL MEDICINE

## 2022-01-04 NOTE — PROGRESS NOTES
Armida Hernandez MD                                  CARDIOLOGY  NOTE       Chief Complaint:    Follow up palpitations     Rare Flutter sensation in the chest   Rare intermittent chest discomfort/pinching sensation   Shortness of breath - improved      [facility] 2/15/2021      Conclusions     Normal coronary angiogram   Normal LVEF with no WMA      Recommendations   Cont medical management   Risk Factor Modifications        Stress Test 1/13/2021    Nemesio Perea .    Small sized defect of moderate severity which is persistent involving    anterior wall of myocardium likely secondary to breast artifact however    anterior wall infarct can not be ruled out. No reversible defect noted.    Normal EF 77 % with normal ventricular contractility.        Recommendation    Probably normal Stress MPI with anterior wall breast attenuation.    Normal wall motion with preserved LVEF    Discuss the results with patient as outpatient         ECHOCARDIOGRAM 10/14/2020     Summary   Left ventricular function is normal, EF is estimated at 55-60%. Mild left ventricular hypertrophy. E/A reversal; indeterminate diastolic function. Mild tricuspid regurgitation. No significant valvular disease noted. Left ventricular function is normal, EF is estimated at 55-60%. Mild left ventricular hypertrophy. E/A reversal; indeterminate diastolic function. No evidence of pericardial effusion. HOLTER  MONITOR 10/1/2020     Patient Name: David Lynne   Medical Record Number: [id number]  Date: 10/12/2020     62 y.o. 1960     INDICATIONS:  Palpitations     FINDINGS:  The patient had predominantly sinus rhythm. Heart rate varied from 48 bpm to 99 bpm.  The patients average heart rate was 67 bpm.    The patient had a holter monitor tracing done for 48 hour(s)  The patient had 3195 ventricular ectopic activity. All single beats, no runs noted. 1.75 % total burden.    The patient had 48 supraventricular ectopy.    The longest R/R interval was 1.42 seconds. No other major arrhythmia was noted.     Events of fluttering in chest correspond to Vent. Ectopies and Normal Sinus Rhythm      CONCLUSION:    Normal Sinus Rhythm   Rare Ventricular Ectopies with burden of 1.75%     RECOMMENDATIONS:      Medical Management. Recent Cardiac HPI:     Naun Griffin is a 62y.o. year old female with past medical history significant for hypertension, back pain presents to the clinic with a chief complaint of fluttering sensation in the chest.  According to the patient, she has very frequent fluttering sensation originating in the abdomen/epigastrium and radiating up the chest.  The frequency of the sensations is every 1-2 hours the usually last for few minutes. Patient denies any presyncope syncope. Patient denies any particular chest pain. Patient denies palpitations  She does complain of occasional shortness of breath and leg edema    Patient has been hypertensive and has been maintained on atenolol 50 mg twice a day. Recently she was started on lisinopril 10 mg by PCP however patient did not take due to dizziness. In the past patient was maintained on amlodipine but due to leg edema she stopped that. She was also on hydrochlorothiazide and she stopped that secondary to hair loss. In the office today her blood pressure is 140/80. Does mention that she checks her blood pressure at home 2-3 times a day and usually her blood pressure is in the 130s to 545N range systolic.     Patient denies any further complaints    EKG office today shows normal sinus rhythm          Current Outpatient Medications   Medication Sig Dispense Refill    atorvastatin (LIPITOR) 20 MG tablet Take 1 tablet by mouth daily 90 tablet 3    atenolol (TENORMIN) 50 MG tablet TAKE 1 TABLET BY MOUTH TWICE DAILY 180 tablet 3    losartan (COZAAR) 50 MG tablet Take 1 tablet by mouth daily 90 tablet 3    aspirin 81 MG chewable tablet Take 81 mg by mouth daily      Cholecalciferol (VITAMIN D) 50 MCG ( UT) CAPS capsule Take by mouth      Multiple Vitamins-Minerals (THERAPEUTIC MULTIVITAMIN-MINERALS) tablet Take 1 tablet by mouth daily       No current facility-administered medications for this visit. Allergies:     Metoprolol and Naproxen    Patient History:    Past Medical History:   Diagnosis Date    Abnormal Pap smear of cervix     Arthritis     H/O echocardiogram 10/14/2020    EF 55-60%,  No significant valvular disease noted.  Heart disease     Hypertension     Osteoarthritis      Past Surgical History:   Procedure Laterality Date     SECTION      TUBAL LIGATION       Family History   Problem Relation Age of Onset    Heart Surgery Father     High Cholesterol Father     Hypertension Father     Hypertension Sister     Hypertension Brother     Dementia Mother     Hypertension Sister      Social History     Tobacco Use    Smoking status: Never Smoker    Smokeless tobacco: Never Used   Substance Use Topics    Alcohol use: No     Comment: caffeine 1 cup of coffee a day        Review of Systems:     · Constitutional:  No Fever or Weight Loss   · Eyes: No Decreased Vision  · ENT: No Headaches, Hearing Loss or Vertigo  · Cardiovascular: No Chest Pain,  + Shortness of breath, No Palpitations. No Edema. Fluttering sensation in the chest  · Respiratory: No cough or wheezing .  No Respiratory distress   · Gastrointestinal: No abdominal pain, appetite loss, blood in stools, constipation, diarrhea or heartburn  · Genitourinary: No dysuria, trouble voiding, or hematuria  · Musculoskeletal:  denies any new  joint aches , or pain   · Integumentary: No rash or pruritis  · Neurological: No TIA or stroke symptoms  · Psychiatric: No anxiety or depression  · Endocrine: No malaise, fatigue or temperature intolerance  · Hematologic/Lymphatic: No bleeding problems, blood clots or swollen lymph nodes  · Allergic/Immunologic: No nasal congestion or hives        Objective:      Physical Exam:    /82   Pulse 75   Ht 5' 6\" (1.676 m)   Wt 233 lb 12.8 oz (106.1 kg)   BMI 37.74 kg/m²   Wt Readings from Last 3 Encounters:   01/04/22 233 lb 12.8 oz (106.1 kg)   10/28/21 228 lb 12.8 oz (103.8 kg)   10/19/21 230 lb (104.3 kg)     Body mass index is 37.74 kg/m². Vitals:    01/04/22 0914   BP: 126/82   Pulse: 75        General Appearance and Constitutional: Conversant, Well developed, Well nourished, No acute distress, Non-toxic appearance. HEENT:  Normocephalic, Atraumatic, Bilateral external ears normal, Oropharynx moist, No oral exudates,   Nose normal.   Neck- Normal range of motion, No tenderness, Supple  Eyes:  Conjunctiva normal, No discharge. Respiratory:  Normal breath sounds, No respiratory distress, No wheezing, No Rales, No Ronchi. No chest tenderness. Cardiovascular: S1-S2, no added heart sounds, No Mumurs appreciated. No gallops, rubs. No Pedal Edema   GI:  Bowel sounds normal, Soft, No tenderness,  :  No costovertebral angle tenderness   Musculoskeletal:  No gross deformities.  Back- No tenderness  Integument:  Well hydrated, no rash   Lymphatic:  No lymphadenopathy noted   Neurologic:  Alert & oriented x 3, Normal motor function, normal sensory function, no focal deficits noted   Psychiatric:  Speech and behavior appropriate       Medical decision making and Data review:    DATA:    Lab Results   Component Value Date    TROPONINT <0.010 12/17/2018     BNP:  No results found for: PROBNP  PT/INR:  No results found for: PTINR  Lab Results   Component Value Date    LABA1C 5.9 08/05/2021    LABA1C 6.3 09/17/2020     Lab Results   Component Value Date    CHOL 233 (H) 09/17/2020    TRIG 75 09/17/2020    HDL 68 (H) 09/17/2020    LDLCALC 150 (H) 09/17/2020     Lab Results   Component Value Date    WBC 6.3 02/09/2021    HGB 13.2 02/09/2021    HCT 42.6 02/09/2021    MCV 90.8 02/09/2021     02/09/2021     TSH:   Lab Results

## 2022-01-04 NOTE — PROGRESS NOTES
Pt did not bring medications or lists.  Pt can't remember what medication she is taking for Urinary Tract infection

## 2022-01-04 NOTE — LETTER
Jr Murrieta  1960  [id number]    Have you had any Chest Pain that is not new? - Yes  If Yes DO EKG - How does it feel - Patient states that it feels like a pinch   How long does the pain last -  seconds    How long have you been having the pain - Patient said it happened one day almost a week ago. Around 12/23/2021    DO EKG IF: Patient has a Heart Rate above 100 or below 40     CAD (Coronary Artery Disease) patient should have one on file every 6 months      Have you had any Shortness of Breath - No    Have you had any dizziness - No    Have you had any palpitations that are not new? - Yes  If Yes DO EKG - Do you feel your heart fluttering  How long does it last - .  seconds     Do you have any edema - swelling in No      When did you have your last labs drawn 10/19/2021  Where did you have them done   What doctor ordered [address]    If we do not have these labs you are retrieve these labs for these providers!     Do you have a surgery or procedure scheduled in the near future - No     Ask patient if they want to sign up for [employer] if they are not already signed up     Check to see if we have an E-MAIL on file for the patient     Check medication list thoroughly!!! AND RECONCILE OUTSIDE MEDICATIONS  If dose has changed change the entire order not just the MG  BE SURE TO ASK PATIENT IF THEY NEED MEDICATION REFILLS     At check out add to every patient's \"wrap up\" the following dot phrase AFTERHOURSEDUCATION and ensure we explain this to our patients

## 2022-01-04 NOTE — PATIENT INSTRUCTIONS
**It is YOUR responsibilty to bring medication bottles and/or updated medication list to [address] [place] [address]. This will allow us to better serve you and all your healthcare needs**  Please be informed that if you contact our office outside of normal business hours the physician on call cannot help with any scheduling or rescheduling issues, procedure instruction questions or any type of medication issue. We advise you for any urgent/emergency that you go to the nearest emergency room!     PLEASE CALL OUR OFFICE DURING NORMAL BUSINESS HOURS    Monday - Friday   8 am to 5 pm    [place]: [address]: [phone]    [place]:  [phone]

## 2022-03-28 ENCOUNTER — OFFICE VISIT (OUTPATIENT)
Dept: GASTROENTEROLOGY | Age: 62
End: 2022-03-28
Payer: MEDICARE

## 2022-03-28 VITALS
HEART RATE: 70 BPM | WEIGHT: 241 LBS | HEIGHT: 66 IN | OXYGEN SATURATION: 97 % | TEMPERATURE: 97.4 F | SYSTOLIC BLOOD PRESSURE: 136 MMHG | DIASTOLIC BLOOD PRESSURE: 72 MMHG | BODY MASS INDEX: 38.73 KG/M2

## 2022-03-28 DIAGNOSIS — R19.5 POSITIVE FIT (FECAL IMMUNOCHEMICAL TEST): Primary | ICD-10-CM

## 2022-03-28 DIAGNOSIS — E66.01 SEVERE OBESITY (BMI 35.0-39.9) WITH COMORBIDITY (HCC): ICD-10-CM

## 2022-03-28 PROCEDURE — 3017F COLORECTAL CA SCREEN DOC REV: CPT | Performed by: SPECIALIST

## 2022-03-28 PROCEDURE — G8484 FLU IMMUNIZE NO ADMIN: HCPCS | Performed by: SPECIALIST

## 2022-03-28 PROCEDURE — 1036F TOBACCO NON-USER: CPT | Performed by: SPECIALIST

## 2022-03-28 PROCEDURE — G8427 DOCREV CUR MEDS BY ELIG CLIN: HCPCS | Performed by: SPECIALIST

## 2022-03-28 PROCEDURE — G8417 CALC BMI ABV UP PARAM F/U: HCPCS | Performed by: SPECIALIST

## 2022-03-28 PROCEDURE — 99203 OFFICE O/P NEW LOW 30 MIN: CPT | Performed by: SPECIALIST

## 2022-03-28 PROCEDURE — G9899 SCRN MAM PERF RSLTS DOC: HCPCS | Performed by: SPECIALIST

## 2022-03-28 RX ORDER — CEPHALEXIN 500 MG/1
CAPSULE ORAL
COMMUNITY
Start: 2022-02-25 | End: 2022-04-28

## 2022-03-28 RX ORDER — NITROFURANTOIN MACROCRYSTALS 50 MG/1
CAPSULE ORAL
COMMUNITY
Start: 2022-02-17 | End: 2022-04-28

## 2022-03-28 NOTE — PROGRESS NOTES
Gastroenterology Consult Note  Anabela Wu. Adonis RAMÍREZ      Reason for Consult:  Positive FIT  Primary Care / referring Physician:  Ata Rodriguez MD      History Obtained From:  patient    CC: positive FIT    HISTORY OF PRESENT ILLNESS:          Had positive FIT and has occasional blood on the tissue. Some difficulty getting stool out-- painful during BM. Had colonoscopy at age age 51- nothing found. No diarrhea, melena. No FH colon cancer         Past Medical History:        Diagnosis Date    Abnormal Pap smear of cervix     Arthritis     H/O echocardiogram 10/14/2020    EF 55-60%,  No significant valvular disease noted.  Heart disease     Hypertension     Osteoarthritis        Past Surgical History:        Procedure Laterality Date     SECTION      TUBAL LIGATION         Social History:   Social History     Tobacco Use    Smoking status: Never Smoker    Smokeless tobacco: Never Used   Substance Use Topics    Alcohol use: No     Comment: caffeine 1 cup of coffee a day       Medications:   Prior to Admission medications    Medication Sig Start Date End Date Taking?  Authorizing Provider   cephALEXin (KEFLEX) 500 MG capsule TAKE 1 CAPSULE BY MOUTH EVERY 8 HOURS 22  Yes Historical Provider, MD   nitrofurantoin (MACRODANTIN) 50 MG capsule TAKE 1 CAPSULE BY MOUTH EVERY NIGHT 22  Yes Historical Provider, MD   atorvastatin (LIPITOR) 20 MG tablet Take 1 tablet by mouth daily 21  Yes Deborah Gao MD   atenolol (TENORMIN) 50 MG tablet TAKE 1 TABLET BY MOUTH TWICE DAILY 21  Yes Ata Rodriguez MD   losartan (COZAAR) 50 MG tablet Take 1 tablet by mouth daily 5/4/21 3/28/22 Yes Deborah Gao MD   aspirin 81 MG chewable tablet Take 81 mg by mouth daily   Yes Historical Provider, MD   Cholecalciferol (VITAMIN D) 50 MCG (2000 UT) CAPS capsule Take by mouth   Yes Historical Provider, MD   Multiple Vitamins-Minerals (THERAPEUTIC MULTIVITAMIN-MINERALS) tablet Take 1 tablet by mouth daily Yes Historical Provider, MD       Allergies: Allergies   Allergen Reactions    Metoprolol      Pt states it gives her a HA    Naproxen    . Family History:   reviewed and positives included in HPI- all other pertinent GI family history negative    REVIEW OF SYSTEMS:   see HPI for positives and pertinent negatives.  All other systems reviewed and are negative    PHYSICAL EXAM:    Vitals:  /72 (Site: Left Upper Arm, Position: Sitting, Cuff Size: Large Adult)   Pulse 70   Temp 97.4 °F (36.3 °C) (Infrared)   Ht 5' 6\" (1.676 m)   Wt 241 lb (109.3 kg)   SpO2 97%   BMI 38.90 kg/m²   CONSTITUTIONAL: alert, cooperative, no apparent distress,   EYES:  pupils equal, round and reactive to light and sclera clear and non-icteric  ENT:  normocepalic, without obvious abnormality, airway Class 1  NECK:  supple, symmetrical, trachea midline,thyroid not enlarged and without masses  HEMATOLOGIC/LYMPHATICS:  no cervical lymphadenopathy and no supraclavicular lymphadenopathy  LUNGS:  no increased respiratory effort, lungs clear to auscultation  CARDIOVASCULAR:  regular rate and rhythm and no murmur noted, no evidence of AAA  ABDOMEN:  normal bowel sounds, soft, non-distended, non-tender with no hernias apprecioated nor masses palpated--no hepatosplenomegaly  NEUROLOGIC: cranial nerves 2-12 grossly intact,no focal deficit detected, no asterixis  SKIN:  no lesions, jaundice  EXTREMITIES: no clubbing, cyanosis, or edema    DATA:      CBC:    Lab Results   Component Value Date    WBC 6.3 02/09/2021    HGB 13.2 02/09/2021    MCV 90.8 02/09/2021     02/09/2021     CMP:    Lab Results   Component Value Date     02/09/2021    K 4.1 02/09/2021     02/09/2021    CO2 25 02/09/2021    BUN 13 02/09/2021    CREATININE 0.8 02/09/2021    GFRAA >60 02/09/2021    AGRATIO 1.1 01/28/2020    LABGLOM >60 02/09/2021    GLUCOSE 84 02/09/2021    PROT 8.5 01/28/2020    PROT 7.2 04/11/2011    LABALBU 4.4 01/28/2020    CALCIUM 9.5 02/09/2021    BILITOT 0.3 01/28/2020    ALKPHOS 83 01/28/2020    AST 18 01/28/2020    ALT 17 01/28/2020         IMPRESSION:  1) positive FIT and anal pain- rule out anal fissure, anal or colon lesion      RECOMMENDATIONS:  1) colonoscopy scheduled          Jovon Lamb M.D.

## 2022-04-11 ENCOUNTER — TELEPHONE (OUTPATIENT)
Dept: GASTROENTEROLOGY | Age: 62
End: 2022-04-11

## 2022-04-11 DIAGNOSIS — R19.5 POSITIVE FIT (FECAL IMMUNOCHEMICAL TEST): Primary | ICD-10-CM

## 2022-04-11 RX ORDER — SIMETHICONE 80 MG
TABLET,CHEWABLE ORAL
Qty: 3 TABLET | Refills: 0 | Status: SHIPPED | OUTPATIENT
Start: 2022-04-11 | End: 2022-06-30

## 2022-04-11 RX ORDER — POLYETHYLENE GLYCOL 3350, SODIUM SULFATE, SODIUM CHLORIDE, POTASSIUM CHLORIDE, ASCORBIC ACID, SODIUM ASCORBATE 140-9-5.2G
KIT ORAL
Qty: 1 EACH | Refills: 0 | Status: SHIPPED | OUTPATIENT
Start: 2022-04-11 | End: 2022-06-30

## 2022-04-28 ENCOUNTER — OFFICE VISIT (OUTPATIENT)
Dept: FAMILY MEDICINE CLINIC | Age: 62
End: 2022-04-28
Payer: MEDICARE

## 2022-04-28 VITALS
SYSTOLIC BLOOD PRESSURE: 126 MMHG | WEIGHT: 240 LBS | OXYGEN SATURATION: 98 % | HEART RATE: 76 BPM | TEMPERATURE: 98 F | BODY MASS INDEX: 38.74 KG/M2 | DIASTOLIC BLOOD PRESSURE: 78 MMHG

## 2022-04-28 DIAGNOSIS — R73.03 PREDIABETES: ICD-10-CM

## 2022-04-28 DIAGNOSIS — R30.0 DYSURIA: ICD-10-CM

## 2022-04-28 DIAGNOSIS — E78.2 MIXED HYPERLIPIDEMIA: ICD-10-CM

## 2022-04-28 DIAGNOSIS — N30.01 ACUTE CYSTITIS WITH HEMATURIA: ICD-10-CM

## 2022-04-28 DIAGNOSIS — I10 ESSENTIAL HYPERTENSION: Primary | ICD-10-CM

## 2022-04-28 DIAGNOSIS — R82.90 URINE ABNORMALITY: ICD-10-CM

## 2022-04-28 LAB
BILIRUBIN, POC: NEGATIVE
BLOOD URINE, POC: ABNORMAL
CLARITY, POC: CLEAR
COLOR, POC: YELLOW
GLUCOSE URINE, POC: NEGATIVE
KETONES, POC: NEGATIVE
LEUKOCYTE EST, POC: ABNORMAL
NITRITE, POC: NEGATIVE
PH, POC: 6
PROTEIN, POC: NEGATIVE
SPECIFIC GRAVITY, POC: 1.01
UROBILINOGEN, POC: ABNORMAL

## 2022-04-28 PROCEDURE — 99214 OFFICE O/P EST MOD 30 MIN: CPT | Performed by: FAMILY MEDICINE

## 2022-04-28 PROCEDURE — G9899 SCRN MAM PERF RSLTS DOC: HCPCS | Performed by: FAMILY MEDICINE

## 2022-04-28 PROCEDURE — 3017F COLORECTAL CA SCREEN DOC REV: CPT | Performed by: FAMILY MEDICINE

## 2022-04-28 PROCEDURE — G8417 CALC BMI ABV UP PARAM F/U: HCPCS | Performed by: FAMILY MEDICINE

## 2022-04-28 PROCEDURE — 81002 URINALYSIS NONAUTO W/O SCOPE: CPT | Performed by: FAMILY MEDICINE

## 2022-04-28 PROCEDURE — 1036F TOBACCO NON-USER: CPT | Performed by: FAMILY MEDICINE

## 2022-04-28 PROCEDURE — G8427 DOCREV CUR MEDS BY ELIG CLIN: HCPCS | Performed by: FAMILY MEDICINE

## 2022-04-28 RX ORDER — NITROFURANTOIN 25; 75 MG/1; MG/1
100 CAPSULE ORAL 2 TIMES DAILY
Qty: 20 CAPSULE | Refills: 0 | Status: SHIPPED | OUTPATIENT
Start: 2022-04-28 | End: 2022-05-08

## 2022-04-28 NOTE — PROGRESS NOTES
Stevan Hartman  1960  04/29/22    Chief Complaint   Patient presents with    6 Month Follow-Up     regarding HTN, HLD and prediabetic    Discuss Medications     losartan making her gain weight and causing fatigue     Urinary Tract Infection     painful urination pt reports blood in urine            Patient here for 6 months f/u regarding HTN, HLD, prediabetic, The patient is taking hypertensive medications, but stats the losartan make her tired and fatigue. Denies chest pain, dyspnea, edema, or TIA's,  She is taking cholesterol medication with no side effects and watch diet for her BS. Patient also c/o burning urination, and lower abdominal pain going on for 1 wk, no fever, no nausea and no vominting. Past Medical History:   Diagnosis Date    Abnormal Pap smear of cervix     Arthritis     Fracture     right foot, left wrist, lumbar    H/O echocardiogram 10/14/2020    EF 55-60%,  No significant valvular disease noted.  Heart disease     Hypertension     Kidney scarring     Osteoarthritis     PONV (postoperative nausea and vomiting)     Rectal bleeding     UTI (urinary tract infection)      Past Surgical History:   Procedure Laterality Date    CARDIAC CATHETERIZATION      no stents.      SECTION      COLONOSCOPY      TUBAL LIGATION       Family History   Problem Relation Age of Onset    Heart Surgery Father     High Cholesterol Father     Hypertension Father     Hypertension Sister     Hypertension Brother     Dementia Mother     Hypertension Sister      Social History     Socioeconomic History    Marital status: Legally      Spouse name: Not on file    Number of children: Not on file    Years of education: Not on file    Highest education level: Not on file   Occupational History    Not on file   Tobacco Use    Smoking status: Never Smoker    Smokeless tobacco: Never Used   Vaping Use    Vaping Use: Never used   Substance and Sexual Activity  Alcohol use: No     Comment: caffeine 1 cup of coffee a day    Drug use: No    Sexual activity: Not on file   Other Topics Concern    Not on file   Social History Narrative    Not on file     Social Determinants of Health     Financial Resource Strain: Low Risk     Difficulty of Paying Living Expenses: Not very hard   Food Insecurity: No Food Insecurity    Worried About Running Out of Food in the Last Year: Never true    [place] of Food in the Last Year: Never true   Transportation Needs:     Lack of Transportation (Medical): Not on file    Lack of Transportation (Non-Medical):  Not on file   Physical Activity:     Days of Exercise per Week: Not on file    Minutes of Exercise per Session: Not on file   Stress:     Feeling of Stress : Not on file   Social Connections:     Frequency of Communication with Friends and Family: Not on file    Frequency of Social Gatherings with Friends and Family: Not on file    Attends [employer] Services: Not on file    Active Member of [address] [place] [address] or Organizations: Not on file    Attends Club or Organization Meetings: Not on file    Marital Status: Not on file   Intimate Partner Violence:     Fear of Current or Ex-Partner: Not on file    Emotionally Abused: Not on file    Physically Abused: Not on file    Sexually Abused: Not on file   Housing Stability:     Unable to Pay for Housing in the Last Year: Not on file    Number of [place] in the Last Year: Not on file    Unstable Housing in the Last Year: Not on file       Allergies   Allergen Reactions    Metoprolol      Pt states it gives her a HA    Naproxen      Current Outpatient Medications   Medication Sig Dispense Refill    nitrofurantoin, macrocrystal-monohydrate, (MACROBID) 100 MG capsule Take 1 capsule by mouth 2 times daily for 10 days 20 capsule 0    atenolol (TENORMIN) 50 MG tablet TAKE 1 TABLET BY MOUTH TWICE DAILY 180 tablet 3    aspirin 81 MG chewable tablet Take 81 mg by mouth daily      PLENVU 140 g SOLR AS DIRECTED FOR COLONOSCOPY PREP (Patient not taking: Reported on 4/28/2022) 1 each 0    simethicone (MYLICON) 80 MG chewable tablet AS DIRECTED FOR COLONOSCOPY PREP (Patient not taking: Reported on 4/28/2022) 3 tablet 0    atorvastatin (LIPITOR) 20 MG tablet Take 1 tablet by mouth daily (Patient not taking: Reported on 4/28/2022) 90 tablet 3     No current facility-administered medications for this visit. Review of Systems   Constitutional: Negative for activity change, chills and fever. Respiratory: Negative for cough and shortness of breath. Cardiovascular: Negative for chest pain and leg swelling. Gastrointestinal: Positive for abdominal pain. Genitourinary: Positive for dysuria. Negative for flank pain, frequency and hematuria. Musculoskeletal: Negative for back pain. Skin: Negative for rash. Neurological: Negative for dizziness and headaches. Psychiatric/Behavioral: Negative for agitation and sleep disturbance. The patient is not nervous/anxious.         Lab Results   Component Value Date    WBC 6.3 02/09/2021    HGB 13.2 02/09/2021    HCT 42.6 02/09/2021    MCV 90.8 02/09/2021     02/09/2021     Lab Results   Component Value Date     02/09/2021    K 4.1 02/09/2021     02/09/2021    CO2 25 02/09/2021    BUN 13 02/09/2021    CREATININE 0.8 02/09/2021    GLUCOSE 84 02/09/2021    CALCIUM 9.5 02/09/2021    PROT 8.5 (H) 01/28/2020    LABALBU 4.4 01/28/2020    BILITOT 0.3 01/28/2020    ALKPHOS 83 01/28/2020    AST 18 01/28/2020    ALT 17 01/28/2020    LABGLOM >60 02/09/2021    GFRAA >60 02/09/2021    AGRATIO 1.1 01/28/2020    GLOB 4.1 01/28/2020     Lab Results   Component Value Date    CHOL 233 (H) 09/17/2020    CHOL 254 (H) 01/28/2020     Lab Results   Component Value Date    TRIG 75 09/17/2020    TRIG 68 01/28/2020     Lab Results   Component Value Date    HDL 68 (H) 09/17/2020    HDL 81 (H) 01/28/2020     Lab Results   Component Value Date    LDLCALC 150 (H) 09/17/2020    LDLCALC 159 (H) 01/28/2020     Lab Results   Component Value Date    LABA1C 5.9 08/05/2021     Lab Results   Component Value Date    TSH 1.65 01/28/2020    TSHHS 1.250 12/17/2018         /78 (Site: Left Upper Arm, Position: Sitting, Cuff Size: Medium Adult)   Pulse 76   Temp 98 °F (36.7 °C)   Wt 240 lb (108.9 kg)   SpO2 98%   BMI 38.74 kg/m²     BP Readings from Last 3 Encounters:   04/28/22 126/78   03/28/22 136/72   01/04/22 126/82       Wt Readings from Last 3 Encounters:   04/28/22 240 lb (108.9 kg)   03/28/22 241 lb (109.3 kg)   01/04/22 233 lb 12.8 oz (106.1 kg)         Physical Exam  Constitutional:       General: She is not in acute distress. Appearance: Normal appearance. She is obese. She is not ill-appearing. HENT:      Head: Normocephalic and atraumatic. Eyes:      General: No scleral icterus. Pupils: Pupils are equal, round, and reactive to light. Cardiovascular:      Rate and Rhythm: Normal rate and regular rhythm. Heart sounds: Normal heart sounds. No murmur heard. Pulmonary:      Effort: Pulmonary effort is normal.      Breath sounds: Normal breath sounds. No wheezing. Abdominal:      General: There is no distension. Palpations: Abdomen is soft. Tenderness: There is abdominal tenderness (lower abdominal tenderness). Musculoskeletal:         General: Normal range of motion. Cervical back: Normal range of motion and neck supple. No rigidity. Right lower leg: No edema. Neurological:      General: No focal deficit present. Mental Status: She is alert and oriented to person, place, and time. Psychiatric:         Mood and Affect: Mood normal.         Behavior: Behavior normal.         ASSESSMENT/ PLAN:    1.  Essential hypertension  - stable, patient dose not like the losartan, so will d/c it, and will check the blood pressure at home, if has reading 140/90 call the clinic, will just keep the tenormin  - Comprehensive Metabolic Panel, Fasting; Future  - CBC with Auto Differential; Future    2. Mixed hyperlipidemia  - on atorvastatin, last  on 9/20  - Lipid Panel; Future  - Comprehensive Metabolic Panel, Fasting; Future  - CBC with Auto Differential; Future    3. Prediabetes  - on dietlast A1C 5.9 on 8/21    4. Urine abnormality  - u/a positive for leukocytes  - POCT Urinalysis no Micro    5. Dysuria  - u/a positive for leukocytes    6. Acute cystitis with hematuria  - nitrofurantoin, macrocrystal-monohydrate, (MACROBID) 100 MG capsule; Take 1 capsule by mouth 2 times daily for 10 days  Dispense: 20 capsule; Refill: 0              - All old blood work reviewed with the patient  - Appropriate prescription are addressed. - After visit summery provided. - Questions answered and patient verbalizes understanding.  - Call for any problem, questions, or concerns.  - RTC if symptoms worse. Return in about 2 months (around 6/28/2022).

## 2022-04-29 PROBLEM — N30.01 ACUTE CYSTITIS WITH HEMATURIA: Status: ACTIVE | Noted: 2022-04-29

## 2022-04-29 ASSESSMENT — ENCOUNTER SYMPTOMS
COUGH: 0
BACK PAIN: 0
SHORTNESS OF BREATH: 0
ABDOMINAL PAIN: 1

## 2022-05-02 ENCOUNTER — TELEPHONE (OUTPATIENT)
Dept: GASTROENTEROLOGY | Age: 62
End: 2022-05-02

## 2022-05-06 RX ORDER — ATENOLOL 50 MG/1
TABLET ORAL
Qty: 180 TABLET | Refills: 3 | Status: SHIPPED | OUTPATIENT
Start: 2022-05-06

## 2022-05-09 RX ORDER — ATORVASTATIN CALCIUM 20 MG/1
20 TABLET, FILM COATED ORAL DAILY
Qty: 90 TABLET | Refills: 3 | Status: SHIPPED | OUTPATIENT
Start: 2022-05-09

## 2022-05-09 RX ORDER — ATORVASTATIN CALCIUM 20 MG/1
20 TABLET, FILM COATED ORAL DAILY
Qty: 90 TABLET | Refills: 3 | OUTPATIENT
Start: 2022-05-09

## 2022-05-09 RX ORDER — LOSARTAN POTASSIUM 50 MG/1
50 TABLET ORAL DAILY
Qty: 90 TABLET | Refills: 3 | OUTPATIENT
Start: 2022-05-09 | End: 2022-06-08

## 2022-05-20 DIAGNOSIS — I10 ESSENTIAL HYPERTENSION: ICD-10-CM

## 2022-05-20 DIAGNOSIS — E78.2 MIXED HYPERLIPIDEMIA: ICD-10-CM

## 2022-05-20 LAB
A/G RATIO: 1.3 (ref 1.1–2.2)
ALBUMIN SERPL-MCNC: 4.4 G/DL (ref 3.4–5)
ALP BLD-CCNC: 115 U/L (ref 40–129)
ALT SERPL-CCNC: 13 U/L (ref 10–40)
ANION GAP SERPL CALCULATED.3IONS-SCNC: 16 MMOL/L (ref 3–16)
AST SERPL-CCNC: 15 U/L (ref 15–37)
BASOPHILS ABSOLUTE: 0.1 K/UL (ref 0–0.2)
BASOPHILS RELATIVE PERCENT: 1.2 %
BILIRUB SERPL-MCNC: 0.4 MG/DL (ref 0–1)
BUN BLDV-MCNC: 11 MG/DL (ref 7–20)
CALCIUM SERPL-MCNC: 9.5 MG/DL (ref 8.3–10.6)
CHLORIDE BLD-SCNC: 104 MMOL/L (ref 99–110)
CHOLESTEROL, TOTAL: 163 MG/DL (ref 0–199)
CO2: 19 MMOL/L (ref 21–32)
CREAT SERPL-MCNC: 0.7 MG/DL (ref 0.6–1.2)
EOSINOPHILS ABSOLUTE: 0.1 K/UL (ref 0–0.6)
EOSINOPHILS RELATIVE PERCENT: 2.3 %
GFR AFRICAN AMERICAN: >60
GFR NON-AFRICAN AMERICAN: >60
GLUCOSE FASTING: 107 MG/DL (ref 70–99)
HCT VFR BLD CALC: 36.4 % (ref 36–48)
HDLC SERPL-MCNC: 73 MG/DL (ref 40–60)
HEMOGLOBIN: 12.3 G/DL (ref 12–16)
LDL CHOLESTEROL CALCULATED: 79 MG/DL
LYMPHOCYTES ABSOLUTE: 1.2 K/UL (ref 1–5.1)
LYMPHOCYTES RELATIVE PERCENT: 21 %
MCH RBC QN AUTO: 29.3 PG (ref 26–34)
MCHC RBC AUTO-ENTMCNC: 33.7 G/DL (ref 31–36)
MCV RBC AUTO: 87 FL (ref 80–100)
MONOCYTES ABSOLUTE: 0.4 K/UL (ref 0–1.3)
MONOCYTES RELATIVE PERCENT: 8.1 %
NEUTROPHILS ABSOLUTE: 3.7 K/UL (ref 1.7–7.7)
NEUTROPHILS RELATIVE PERCENT: 67.4 %
PDW BLD-RTO: 13.9 % (ref 12.4–15.4)
PLATELET # BLD: 290 K/UL (ref 135–450)
PMV BLD AUTO: 7.8 FL (ref 5–10.5)
POTASSIUM SERPL-SCNC: 4.3 MMOL/L (ref 3.5–5.1)
RBC # BLD: 4.18 M/UL (ref 4–5.2)
SODIUM BLD-SCNC: 139 MMOL/L (ref 136–145)
TOTAL PROTEIN: 7.8 G/DL (ref 6.4–8.2)
TRIGL SERPL-MCNC: 56 MG/DL (ref 0–150)
VLDLC SERPL CALC-MCNC: 11 MG/DL
WBC # BLD: 5.5 K/UL (ref 4–11)

## 2022-06-30 ENCOUNTER — OFFICE VISIT (OUTPATIENT)
Dept: FAMILY MEDICINE CLINIC | Age: 62
End: 2022-06-30
Payer: MEDICARE

## 2022-06-30 VITALS
WEIGHT: 243.2 LBS | HEART RATE: 58 BPM | SYSTOLIC BLOOD PRESSURE: 128 MMHG | DIASTOLIC BLOOD PRESSURE: 82 MMHG | BODY MASS INDEX: 39.25 KG/M2 | OXYGEN SATURATION: 98 %

## 2022-06-30 DIAGNOSIS — I10 ESSENTIAL HYPERTENSION: Primary | ICD-10-CM

## 2022-06-30 DIAGNOSIS — R30.0 DYSURIA: ICD-10-CM

## 2022-06-30 DIAGNOSIS — N39.0 RECURRENT UTI: ICD-10-CM

## 2022-06-30 LAB
BILIRUBIN, POC: NEGATIVE
BLOOD URINE, POC: ABNORMAL
CLARITY, POC: CLEAR
COLOR, POC: YELLOW
GLUCOSE URINE, POC: NEGATIVE
KETONES, POC: NEGATIVE
LEUKOCYTE EST, POC: ABNORMAL
NITRITE, POC: NEGATIVE
PH, POC: 7
PROTEIN, POC: NEGATIVE
SPECIFIC GRAVITY, POC: 1.02
UROBILINOGEN, POC: ABNORMAL

## 2022-06-30 PROCEDURE — 1036F TOBACCO NON-USER: CPT | Performed by: FAMILY MEDICINE

## 2022-06-30 PROCEDURE — 81002 URINALYSIS NONAUTO W/O SCOPE: CPT | Performed by: FAMILY MEDICINE

## 2022-06-30 PROCEDURE — G8417 CALC BMI ABV UP PARAM F/U: HCPCS | Performed by: FAMILY MEDICINE

## 2022-06-30 PROCEDURE — G9899 SCRN MAM PERF RSLTS DOC: HCPCS | Performed by: FAMILY MEDICINE

## 2022-06-30 PROCEDURE — 99214 OFFICE O/P EST MOD 30 MIN: CPT | Performed by: FAMILY MEDICINE

## 2022-06-30 PROCEDURE — G8427 DOCREV CUR MEDS BY ELIG CLIN: HCPCS | Performed by: FAMILY MEDICINE

## 2022-06-30 PROCEDURE — 3017F COLORECTAL CA SCREEN DOC REV: CPT | Performed by: FAMILY MEDICINE

## 2022-06-30 RX ORDER — SULFAMETHOXAZOLE AND TRIMETHOPRIM 800; 160 MG/1; MG/1
1 TABLET ORAL 2 TIMES DAILY
Qty: 20 TABLET | Refills: 0 | Status: SHIPPED | OUTPATIENT
Start: 2022-06-30 | End: 2022-07-10

## 2022-06-30 ASSESSMENT — ENCOUNTER SYMPTOMS
SHORTNESS OF BREATH: 0
COUGH: 0
BACK PAIN: 0
ABDOMINAL PAIN: 1

## 2022-06-30 ASSESSMENT — PATIENT HEALTH QUESTIONNAIRE - PHQ9
SUM OF ALL RESPONSES TO PHQ QUESTIONS 1-9: 0
6. FEELING BAD ABOUT YOURSELF - OR THAT YOU ARE A FAILURE OR HAVE LET YOURSELF OR YOUR FAMILY DOWN: 0
5. POOR APPETITE OR OVEREATING: 0
SUM OF ALL RESPONSES TO PHQ QUESTIONS 1-9: 0
SUM OF ALL RESPONSES TO PHQ QUESTIONS 1-9: 0
SUM OF ALL RESPONSES TO PHQ9 QUESTIONS 1 & 2: 0
4. FEELING TIRED OR HAVING LITTLE ENERGY: 0
8. MOVING OR SPEAKING SO SLOWLY THAT OTHER PEOPLE COULD HAVE NOTICED. OR THE OPPOSITE, BEING SO FIGETY OR RESTLESS THAT YOU HAVE BEEN MOVING AROUND A LOT MORE THAN USUAL: 0
7. TROUBLE CONCENTRATING ON THINGS, SUCH AS READING THE NEWSPAPER OR WATCHING TELEVISION: 0
9. THOUGHTS THAT YOU WOULD BE BETTER OFF DEAD, OR OF HURTING YOURSELF: 0
SUM OF ALL RESPONSES TO PHQ QUESTIONS 1-9: 0
10. IF YOU CHECKED OFF ANY PROBLEMS, HOW DIFFICULT HAVE THESE PROBLEMS MADE IT FOR YOU TO DO YOUR WORK, TAKE CARE OF THINGS AT HOME, OR GET ALONG WITH OTHER PEOPLE: 0
2. FEELING DOWN, DEPRESSED OR HOPELESS: 0
1. LITTLE INTEREST OR PLEASURE IN DOING THINGS: 0
3. TROUBLE FALLING OR STAYING ASLEEP: 0

## 2022-06-30 NOTE — PROGRESS NOTES
Nissa Lopez  1960  06/30/22    Chief Complaint   Patient presents with    Other     Patient here for 2 month F/U regarding UTI and HTN           Patient here for a 2 months follow-up regarding her urinary tract infection and hypertension, did give her Macrobid last time but patient could not tolerate that and she could not take it, patient also we stopped her lisinopril and she is taking just Tenormin for her blood pressure. Her blood pressure reading being normal at home and patient denies any other complaints. Past Medical History:   Diagnosis Date    Abnormal Pap smear of cervix     Arthritis     Fracture     right foot, left wrist, lumbar    H/O echocardiogram 10/14/2020    EF 55-60%,  No significant valvular disease noted.  Heart disease     Hypertension     Kidney scarring     Osteoarthritis     PONV (postoperative nausea and vomiting)     Rectal bleeding     UTI (urinary tract infection)      Past Surgical History:   Procedure Laterality Date    CARDIAC CATHETERIZATION      no stents.      SECTION      COLONOSCOPY      TUBAL LIGATION       Family History   Problem Relation Age of Onset    Heart Surgery Father     High Cholesterol Father     Hypertension Father     Hypertension Sister     Hypertension Brother     Dementia Mother     Hypertension Sister      Social History     Socioeconomic History    Marital status: Legally      Spouse name: Not on file    Number of children: Not on file    Years of education: Not on file    Highest education level: Not on file   Occupational History    Not on file   Tobacco Use    Smoking status: Never Smoker    Smokeless tobacco: Never Used   Vaping Use    Vaping Use: Never used   Substance and Sexual Activity    Alcohol use: No     Comment: caffeine 1 cup of coffee a day    Drug use: No    Sexual activity: Not on file   Other Topics Concern    Not on file   Social History Narrative    Not on file Social Determinants of Health     Financial Resource Strain: Low Risk     Difficulty of Paying Living Expenses: Not very hard   Food Insecurity: No Food Insecurity    Worried About Running Out of Food in the Last Year: Never true    [place] of Food in the Last Year: Never true   Transportation Needs:     Lack of Transportation (Medical): Not on file    Lack of Transportation (Non-Medical): Not on file   Physical Activity:     Days of Exercise per Week: Not on file    Minutes of Exercise per Session: Not on file   Stress:     Feeling of Stress : Not on file   Social Connections:     Frequency of Communication with Friends and Family: Not on file    Frequency of Social Gatherings with Friends and Family: Not on file    Attends Faith Services: Not on file    Active Member of [address] [place] [address] or Organizations: Not on file    Attends Club or Organization Meetings: Not on file    Marital Status: Not on file   Intimate Partner Violence:     Fear of Current or Ex-Partner: Not on file    Emotionally Abused: Not on file    Physically Abused: Not on file    Sexually Abused: Not on file   Housing Stability:     Unable to Pay for Housing in the Last Year: Not on file    Number of [place] in the Last Year: Not on file    Unstable Housing in the Last Year: Not on file       Allergies   Allergen Reactions    Metoprolol      Pt states it gives her a HA    Naproxen      Current Outpatient Medications   Medication Sig Dispense Refill    sulfamethoxazole-trimethoprim (BACTRIM DS;SEPTRA DS) 800-160 MG per tablet Take 1 tablet by mouth 2 times daily for 10 days 20 tablet 0    atorvastatin (LIPITOR) 20 MG tablet Take 1 tablet by mouth daily 90 tablet 3    atenolol (TENORMIN) 50 MG tablet TAKE 1 TABLET BY MOUTH TWICE DAILY 180 tablet 3    aspirin 81 MG chewable tablet Take 81 mg by mouth daily       No current facility-administered medications for this visit.        Review of Systems   Constitutional: Negative for activity change, chills and fever. Respiratory: Negative for cough and shortness of breath. Cardiovascular: Negative for chest pain and leg swelling. Gastrointestinal: Positive for abdominal pain. Genitourinary: Positive for dysuria. Negative for flank pain, frequency, hematuria, vaginal bleeding and vaginal discharge. Musculoskeletal: Negative for back pain. Neurological: Negative for dizziness and headaches. Psychiatric/Behavioral: Negative for agitation and sleep disturbance. The patient is not nervous/anxious.         Lab Results   Component Value Date    WBC 5.5 05/20/2022    HGB 12.3 05/20/2022    HCT 36.4 05/20/2022    MCV 87.0 05/20/2022     05/20/2022     Lab Results   Component Value Date     05/20/2022    K 4.3 05/20/2022     05/20/2022    CO2 19 (L) 05/20/2022    BUN 11 05/20/2022    CREATININE 0.7 05/20/2022    GLUCOSE 84 02/09/2021    CALCIUM 9.5 05/20/2022    PROT 7.8 05/20/2022    LABALBU 4.4 05/20/2022    BILITOT 0.4 05/20/2022    ALKPHOS 115 05/20/2022    AST 15 05/20/2022    ALT 13 05/20/2022    LABGLOM >60 05/20/2022    GFRAA >60 05/20/2022    AGRATIO 1.3 05/20/2022    GLOB 4.1 01/28/2020     Lab Results   Component Value Date    CHOL 163 05/20/2022    CHOL 233 (H) 09/17/2020    CHOL 254 (H) 01/28/2020     Lab Results   Component Value Date    TRIG 56 05/20/2022    TRIG 75 09/17/2020    TRIG 68 01/28/2020     Lab Results   Component Value Date    HDL 73 (H) 05/20/2022    HDL 68 (H) 09/17/2020    HDL 81 (H) 01/28/2020     Lab Results   Component Value Date    LDLCALC 79 05/20/2022    LDLCALC 150 (H) 09/17/2020    LDLCALC 159 (H) 01/28/2020     Lab Results   Component Value Date    LABA1C 5.9 08/05/2021     Lab Results   Component Value Date    TSH 1.65 01/28/2020    TSHHS 1.250 12/17/2018         /82 (Site: Left Upper Arm, Position: Sitting, Cuff Size: Large Adult)   Pulse 58   Wt 243 lb 3.2 oz (110.3 kg)   SpO2 98%   BMI 39.25 kg/m²     BP Readings from Last 3 Encounters:   06/30/22 128/82   04/28/22 126/78   03/28/22 136/72       Wt Readings from Last 3 Encounters:   06/30/22 243 lb 3.2 oz (110.3 kg)   04/28/22 240 lb (108.9 kg)   03/28/22 241 lb (109.3 kg)         Physical Exam  Constitutional:       General: She is not in acute distress. Appearance: Normal appearance. She is obese. She is not ill-appearing. HENT:      Head: Normocephalic and atraumatic. Eyes:      General: No scleral icterus. Pupils: Pupils are equal, round, and reactive to light. Cardiovascular:      Rate and Rhythm: Normal rate and regular rhythm. Heart sounds: Normal heart sounds. No murmur heard. Pulmonary:      Effort: Pulmonary effort is normal.      Breath sounds: Normal breath sounds. No wheezing. Abdominal:      General: There is no distension. Palpations: Abdomen is soft. Tenderness: There is abdominal tenderness (lower abdominal tenderness). Musculoskeletal:         General: Normal range of motion. Cervical back: Normal range of motion and neck supple. No rigidity. Right lower leg: No edema. Left lower leg: No edema. Neurological:      General: No focal deficit present. Mental Status: She is alert and oriented to person, place, and time. Psychiatric:         Mood and Affect: Mood normal.         Behavior: Behavior normal.         ASSESSMENT/ PLAN:    1. Essential hypertension  -Blood pressure is stable patient taking just Tenormin    2. Dysuria  -Positive for leukocyte, but was given last time patient could not tolerate that so we will switch to the Bactrim  - POCT Urinalysis no Micro    3. Recurrent UTI  -Will start Bactrim  - sulfamethoxazole-trimethoprim (BACTRIM DS;SEPTRA DS) 800-160 MG per tablet; Take 1 tablet by mouth 2 times daily for 10 days  Dispense: 20 tablet;  Refill: 0    -If patient have a recurrent attack we will send her to the urology for further work-up          - All old blood work reviewed with the patient  - Appropriate prescription are addressed. - After visit summery provided. - Questions answered and patient verbalizes understanding.  - Call for any problem, questions, or concerns.  - RTC if symptoms worse. Return in about 6 months (around 12/30/2022).

## 2022-08-25 ENCOUNTER — HOSPITAL ENCOUNTER (EMERGENCY)
Age: 62
Discharge: HOME | End: 2022-08-25
Payer: MEDICAID

## 2022-08-25 ENCOUNTER — APPOINTMENT (OUTPATIENT)
Dept: GENERAL RADIOLOGY | Age: 62
End: 2022-08-25
Payer: MEDICAID

## 2022-08-25 VITALS
TEMPERATURE: 98.1 F | OXYGEN SATURATION: 98 % | HEART RATE: 73 BPM | DIASTOLIC BLOOD PRESSURE: 70 MMHG | RESPIRATION RATE: 16 BRPM | HEIGHT: 66 IN | WEIGHT: 230 LBS | BODY MASS INDEX: 36.96 KG/M2 | SYSTOLIC BLOOD PRESSURE: 141 MMHG

## 2022-08-25 DIAGNOSIS — M54.2 NECK PAIN: Primary | ICD-10-CM

## 2022-08-25 PROCEDURE — 6360000002 HC RX W HCPCS: Performed by: PHYSICIAN ASSISTANT

## 2022-08-25 PROCEDURE — 96372 THER/PROPH/DIAG INJ SC/IM: CPT

## 2022-08-25 PROCEDURE — 99284 EMERGENCY DEPT VISIT MOD MDM: CPT

## 2022-08-25 PROCEDURE — 72050 X-RAY EXAM NECK SPINE 4/5VWS: CPT

## 2022-08-25 RX ORDER — KETOROLAC TROMETHAMINE 30 MG/ML
30 INJECTION, SOLUTION INTRAMUSCULAR; INTRAVENOUS ONCE
Status: COMPLETED | OUTPATIENT
Start: 2022-08-25 | End: 2022-08-25

## 2022-08-25 RX ORDER — ACETAMINOPHEN 500 MG
500 TABLET ORAL EVERY 6 HOURS PRN
Qty: 30 TABLET | Refills: 1 | Status: SHIPPED | OUTPATIENT
Start: 2022-08-25 | End: 2022-11-03

## 2022-08-25 RX ORDER — METHOCARBAMOL 500 MG/1
500 TABLET, FILM COATED ORAL 3 TIMES DAILY
Qty: 15 TABLET | Refills: 0 | Status: SHIPPED | OUTPATIENT
Start: 2022-08-25 | End: 2022-08-30

## 2022-08-25 RX ORDER — ORPHENADRINE CITRATE 30 MG/ML
60 INJECTION INTRAMUSCULAR; INTRAVENOUS ONCE
Status: COMPLETED | OUTPATIENT
Start: 2022-08-25 | End: 2022-08-25

## 2022-08-25 RX ADMIN — ORPHENADRINE CITRATE 60 MG: 30 INJECTION INTRAMUSCULAR; INTRAVENOUS at 19:04

## 2022-08-25 RX ADMIN — KETOROLAC TROMETHAMINE 30 MG: 30 INJECTION, SOLUTION INTRAMUSCULAR; INTRAVENOUS at 19:04

## 2022-08-25 ASSESSMENT — PAIN SCALES - GENERAL
PAINLEVEL_OUTOF10: 8
PAINLEVEL_OUTOF10: 9

## 2022-08-25 ASSESSMENT — PAIN DESCRIPTION - DESCRIPTORS: DESCRIPTORS: PRESSURE

## 2022-08-25 ASSESSMENT — PAIN DESCRIPTION - FREQUENCY: FREQUENCY: CONTINUOUS

## 2022-08-25 ASSESSMENT — PAIN DESCRIPTION - PAIN TYPE: TYPE: ACUTE PAIN

## 2022-08-25 ASSESSMENT — PAIN DESCRIPTION - ORIENTATION: ORIENTATION: POSTERIOR

## 2022-08-25 ASSESSMENT — PAIN DESCRIPTION - LOCATION: LOCATION: NECK

## 2022-08-25 NOTE — ED NOTES
Pt complaining of neck stiffness for the last 2 days worsening today. Pt states her pain is 8/10 at this time. Pt states her neck was stiff and painful about 2 months ago but it went away with time. Pt states she has not taken anything at home for pain.       Mary Jo Herrera RN  08/25/22 [id number]

## 2022-08-25 NOTE — ED PROVIDER NOTES
other review of systems are negative  See HPI and nursing notes for additional information       PAST MEDICAL & SURGICAL HISTORY    Past Medical History:   Diagnosis Date    Abnormal Pap smear of cervix     Arthritis     Fracture     right foot, left wrist, lumbar    H/O echocardiogram 10/14/2020    EF 55-60%,  No significant valvular disease noted. Heart disease     Hypertension     Kidney scarring     Osteoarthritis     PONV (postoperative nausea and vomiting)     Rectal bleeding     UTI (urinary tract infection)      Past Surgical History:   Procedure Laterality Date    CARDIAC CATHETERIZATION      no stents.      SECTION      COLONOSCOPY      TUBAL LIGATION         CURRENT MEDICATIONS    Current Outpatient Rx   Medication Sig Dispense Refill    methocarbamol (ROBAXIN) 500 MG tablet Take 1 tablet by mouth 3 times daily for 5 days 15 tablet 0    acetaminophen (TYLENOL) 500 MG tablet Take 1 tablet by mouth every 6 hours as needed for Pain 30 tablet 1    atorvastatin (LIPITOR) 20 MG tablet Take 1 tablet by mouth daily 90 tablet 3    atenolol (TENORMIN) 50 MG tablet TAKE 1 TABLET BY MOUTH TWICE DAILY 180 tablet 3    aspirin 81 MG chewable tablet Take 81 mg by mouth daily         ALLERGIES    Allergies   Allergen Reactions    Metoprolol      Pt states it gives her a HA    Naproxen        SOCIAL HISTORY    Social History     Socioeconomic History    Marital status: Legally      Spouse name: None    Number of children: None    Years of education: None    Highest education level: None   Tobacco Use    Smoking status: Never    Smokeless tobacco: Never   Vaping Use    Vaping Use: Never used   Substance and Sexual Activity    Alcohol use: No     Comment: caffeine 1 cup of coffee a day    Drug use: No     Social Determinants of Health     Financial Resource Strain: Low Risk     Difficulty of Paying Living Expenses: Not very hard   Food Insecurity: No Food Insecurity    Worried About Running Out of Food in the Last Year: Never true    Ran Out of Food in the Last Year: Never true       PHYSICAL EXAM    VITAL SIGNS: BP (!) 141/70   Pulse 73   Temp 98.1 °F (36.7 °C) (Oral)   Resp 16   Ht 5' 6\" (1.676 m)   Wt 230 lb (104.3 kg)   SpO2 98%   BMI 37.12 kg/m²    Patient was noted to be afebrile    Constitutional:  Well developed, well nourished. In no acute distress, sitting upright in bed with a stiff posturing of neck  Head:  Atraumatic,  Normocephalic  Eyes:  No scleral icterus. Conjuctiva clear, No discharge  Neck: No JVD, supple. No enlarged lymph nodes. Trachea midline. No anterior neck tenderness or swelling or skin changes. No carotid bruits. - No gross deformity, swelling, or discoloration.    - + Generalized mid to upper paracervical muscle tenderness without masses, fluctuance, warmth, or skin changes.  - No localized midline bony tenderness. No palpable step off, creptitus  - Spurlings test negative  - ROM limited by pain, especially with side-to-side rotation of neck and neck extension. Respiratory: Respirations nonlabored  Abdomen: Bowel sounds normal, Soft, No tenderness, no masses. Musculoskeletal:  No edema, no deformities. Integument:  Well hydrated, no rash, no pallor. Neurologic:    - No obvious neurological deficits.   Patient moves without gross gait deficits   HIGH SENSITIVITY NEURO EXAM:  - Intact C1-4, Sensation back of head and neck, Equal throughout  - Intact C5 Deltoid motor, 5/5 bilaterally  - Intact C5-C6 Biceps Reflex, 2+ bilaterally  - Intact C6 Biceps motor, 5/5 bilaterally  - Intact C7 Extend Wrist/Fingers, 5/5 bilaterally  - Intact C6-C7 Triceps Reflex, 2+ bilaterally  - Intact C8, Flex Fingers, 5/5 bilaterally  - Intact T1, Move fingers apart, 5/5 bilaterally  -  Intact T2-T12 Trunk Sensation, Equal throughout  Vascular:  Distal pulses and capillary refill intact bilateral upper extremities      RADIOLOGY/PROCEDURES       XR CERVICAL SPINE (4-5 VIEWS) (Final result)  Result time 08/25/22 20:11:51  Procedure changed from XR CERVICAL SPINE (2-3 VIEWS)  Final result by Stanley De La O MD (08/25/22 20:11:51)                Impression:    No acute abnormality of the cervical spine. Narrative:    EXAMINATION:   5 XRAY VIEWS OF THE CERVICAL SPINE     8/25/2022 6:54 pm     COMPARISON:   None. HISTORY:   ORDERING SYSTEM PROVIDED HISTORY: neck pain x2 months   TECHNOLOGIST PROVIDED HISTORY:   Reason for exam:->neck pain x2 months   Reason for Exam: neck pain x2 months   Additional signs and symptoms: heasache   Relevant Medical/Surgical History: osteoarthritis     FINDINGS:   All 7 cervical vertebrae are visualized and appear normal in height and   alignment. There is no evidence of prevertebral soft tissue edema or   fracture. The base of the odontoid appears intact. ED COURSE & MEDICAL DECISION MAKING       Vital signs and nursing notes reviewed during ED course. I have independently evaluated this patient . Supervising MD - Dr. Radha Orozco - present in the Emergency Department, available for consultation, throughout entirety of  patient care. All pertinent Lab data and radiographic results reviewed with patient at bedside. The patient and/or the family were informed of the results of any tests/labs/imaging, the treatment plan, and time was allotted to answer questions. Differential Diagnosis: Epidural Abscess,TAA, Metastases to back, Cord Compression, Spinal Fracture, Disk herniation, DDD    Clinical  IMPRESSION    1. Neck pain        Patient presents with neck pain, acute on chronic. On exam, pleasant nontoxic 27-year-old female, no acute distress. Sitting upright stiffly in bed but in no acute distress. No midline step-offs tenderness or crepitus of the bony cervical spine but there is noticeable paraspinal muscle tenderness to palpation with limited range of motion including rotation of the neck.   No signs of managements. Anterior neck is soft and nontender. Patient is neurovascular intact in the upper extremities. No wrist drop. Negative Spurling's. Patient is given IM Toradol and Norflex in the ED. X-ray of the cervical spine shows no evidence of acute fracture or subluxation or significant degenerative disc disease. At this time, given the chronic nature of her neck pain reproducibility with palpation as well as range of motion, suspect likely musculoskeletal including spasm versus torticollis. She is otherwise afebrile without headache complaints or neurodeficits, low clinical suspicion for acute central cord compression, meningitis/encephalitis or other epidural abscess, CVA/TIA, acute dissection, airway obstruction. We discussed supportive symptomatic care and outpatient follow-up with family doctor she may benefit from physical therapy versus additional outpatient imaging including MRI to rule out underlying disc injury or disease. Patient is comfortable and agreeable this plan. She is discharged stable condition with Tylenol muscle relaxer. Encouraged alternating ice/heat applications to the affected area with gentle range of motion stretches of the neck. We will follow-up with family doctor next to 3 days for recheck. Return warnings back to the ED discussed. I recommend followup with primary care provider - call in a.m. Chuck eDsai Diagnosis and plan discussed in detail with patient who understands and agrees. Patient agrees to return emergency department if symptoms worsen or any new symptoms develop. Comment: Please note this report has been produced using speech recognition software and may contain errors related to that system including errors in grammar, punctuation, and spelling, as well as words and phrases that may be inappropriate. If there are any questions or concerns please feel free to contact the dictating provider for clarification.          Calista Simmons PA-C  08/25/22 [id number]

## 2022-09-29 ENCOUNTER — OFFICE VISIT (OUTPATIENT)
Dept: FAMILY MEDICINE CLINIC | Age: 62
End: 2022-09-29
Payer: MEDICAID

## 2022-09-29 VITALS
HEART RATE: 72 BPM | OXYGEN SATURATION: 99 % | BODY MASS INDEX: 39.71 KG/M2 | WEIGHT: 246 LBS | SYSTOLIC BLOOD PRESSURE: 170 MMHG | DIASTOLIC BLOOD PRESSURE: 90 MMHG

## 2022-09-29 DIAGNOSIS — N30.01 ACUTE CYSTITIS WITH HEMATURIA: ICD-10-CM

## 2022-09-29 DIAGNOSIS — R30.0 DYSURIA: ICD-10-CM

## 2022-09-29 DIAGNOSIS — M54.2 NECK PAIN: Primary | ICD-10-CM

## 2022-09-29 LAB
BILIRUBIN, POC: NEGATIVE
BLOOD URINE, POC: ABNORMAL
CLARITY, POC: CLEAR
COLOR, POC: YELLOW
GLUCOSE URINE, POC: NEGATIVE
KETONES, POC: NEGATIVE
LEUKOCYTE EST, POC: ABNORMAL
NITRITE, POC: NEGATIVE
PH, POC: 6.5
PROTEIN, POC: NEGATIVE
SPECIFIC GRAVITY, POC: 1.02
UROBILINOGEN, POC: ABNORMAL

## 2022-09-29 PROCEDURE — 99214 OFFICE O/P EST MOD 30 MIN: CPT | Performed by: FAMILY MEDICINE

## 2022-09-29 PROCEDURE — 81002 URINALYSIS NONAUTO W/O SCOPE: CPT | Performed by: FAMILY MEDICINE

## 2022-09-29 RX ORDER — AMLODIPINE BESYLATE 5 MG/1
5 TABLET ORAL DAILY
Qty: 90 TABLET | Refills: 3 | Status: SHIPPED | OUTPATIENT
Start: 2022-09-29

## 2022-09-29 RX ORDER — AMLODIPINE BESYLATE 5 MG/1
5 TABLET ORAL DAILY
Qty: 30 TABLET | Refills: 3 | Status: SHIPPED | OUTPATIENT
Start: 2022-09-29 | End: 2022-09-29

## 2022-09-29 RX ORDER — SULFAMETHOXAZOLE AND TRIMETHOPRIM 800; 160 MG/1; MG/1
1 TABLET ORAL 2 TIMES DAILY
Qty: 14 TABLET | Refills: 0 | Status: SHIPPED | OUTPATIENT
Start: 2022-09-29 | End: 2022-10-06

## 2022-09-29 NOTE — PROGRESS NOTES
Nissa Overton  1960  10/09/22    Chief Complaint   Patient presents with    Follow-up     From the ED for neck pain    Other     Patient states having burning and pressure with urination           Patient here for f/u from the ED for neck pain which getting better, patient also c/o dysuria for few days, patient was referred to the urology but no body contact her. Dysuria   This is a recurrent problem. The current episode started in the past 7 days. The problem occurs every urination. The problem has been unchanged. The quality of the pain is described as aching. The pain is at a severity of 5/10. There has been no fever. There is No history of pyelonephritis. Associated symptoms include hesitancy. Pertinent negatives include no chills, discharge, flank pain, frequency, hematuria, nausea or vomiting. She has tried nothing (many courses of antibiotic) for the symptoms. Her past medical history is significant for recurrent UTIs. Past Medical History:   Diagnosis Date    Abnormal Pap smear of cervix     Arthritis     Fracture     right foot, left wrist, lumbar    H/O echocardiogram 10/14/2020    EF 55-60%,  No significant valvular disease noted. Heart disease     Hypertension     Kidney scarring     Osteoarthritis     PONV (postoperative nausea and vomiting)     Rectal bleeding     UTI (urinary tract infection)      Past Surgical History:   Procedure Laterality Date    CARDIAC CATHETERIZATION      no stents.      SECTION      COLONOSCOPY      TUBAL LIGATION       Family History   Problem Relation Age of Onset    Heart Surgery Father     High Cholesterol Father     Hypertension Father     Hypertension Sister     Hypertension Brother     Dementia Mother     Hypertension Sister      Social History     Socioeconomic History    Marital status: Legally      Spouse name: Not on file    Number of children: Not on file    Years of education: Not on file    Highest education level: Not on file   Occupational History    Not on file   Tobacco Use    Smoking status: Never    Smokeless tobacco: Never   Vaping Use    Vaping Use: Never used   Substance and Sexual Activity    Alcohol use: No     Comment: caffeine 1 cup of coffee a day    Drug use: No    Sexual activity: Not on file   Other Topics Concern    Not on file   Social History Narrative    Not on file     Social Determinants of Health     Financial Resource Strain: Low Risk     Difficulty of Paying Living Expenses: Not very hard   Food Insecurity: No Food Insecurity    Worried About Running Out of Food in the Last Year: Never true    Ran Out of Food in the Last Year: Never true   Transportation Needs: Not on file   Physical Activity: Not on file   Stress: Not on file   Social Connections: Not on file   Intimate Partner Violence: Not on file   Housing Stability: Not on file       Allergies   Allergen Reactions    Metoprolol      Pt states it gives her a HA    Naproxen      Current Outpatient Medications   Medication Sig Dispense Refill    acetaminophen (TYLENOL) 500 MG tablet Take 1 tablet by mouth every 6 hours as needed for Pain 30 tablet 1    atorvastatin (LIPITOR) 20 MG tablet Take 1 tablet by mouth daily 90 tablet 3    atenolol (TENORMIN) 50 MG tablet TAKE 1 TABLET BY MOUTH TWICE DAILY 180 tablet 3    aspirin 81 MG chewable tablet Take 81 mg by mouth daily      amLODIPine (NORVASC) 5 MG tablet TAKE 1 TABLET BY MOUTH DAILY 90 tablet 3     No current facility-administered medications for this visit. Review of Systems   Constitutional:  Negative for activity change, chills and fever. Respiratory:  Negative for cough and shortness of breath. Cardiovascular:  Negative for chest pain and leg swelling. Gastrointestinal:  Positive for abdominal pain. Negative for nausea and vomiting. Genitourinary:  Positive for dysuria and hesitancy. Negative for flank pain, frequency, hematuria, vaginal bleeding and vaginal discharge. Musculoskeletal:  Positive for neck pain (better). Negative for back pain. Neurological:  Negative for dizziness and headaches. Psychiatric/Behavioral:  Negative for agitation and sleep disturbance. The patient is not nervous/anxious. Lab Results   Component Value Date    WBC 5.5 05/20/2022    HGB 12.3 05/20/2022    HCT 36.4 05/20/2022    MCV 87.0 05/20/2022     05/20/2022     Lab Results   Component Value Date     05/20/2022    K 4.3 05/20/2022     05/20/2022    CO2 19 (L) 05/20/2022    BUN 11 05/20/2022    CREATININE 0.7 05/20/2022    GLUCOSE 84 02/09/2021    CALCIUM 9.5 05/20/2022    PROT 7.8 05/20/2022    LABALBU 4.4 05/20/2022    BILITOT 0.4 05/20/2022    ALKPHOS 115 05/20/2022    AST 15 05/20/2022    ALT 13 05/20/2022    LABGLOM >60 05/20/2022    GFRAA >60 05/20/2022    AGRATIO 1.3 05/20/2022    GLOB 4.1 01/28/2020     Lab Results   Component Value Date    CHOL 163 05/20/2022    CHOL 233 (H) 09/17/2020    CHOL 254 (H) 01/28/2020     Lab Results   Component Value Date    TRIG 56 05/20/2022    TRIG 75 09/17/2020    TRIG 68 01/28/2020     Lab Results   Component Value Date    HDL 73 (H) 05/20/2022    HDL 68 (H) 09/17/2020    HDL 81 (H) 01/28/2020     Lab Results   Component Value Date    LDLCALC 79 05/20/2022    LDLCALC 150 (H) 09/17/2020    LDLCALC 159 (H) 01/28/2020     Lab Results   Component Value Date    LABA1C 5.9 08/05/2021     Lab Results   Component Value Date    TSH 1.65 01/28/2020    TSHHS 1.250 12/17/2018         BP (!) 170/90   Pulse 72   Wt 246 lb (111.6 kg)   SpO2 99%   BMI 39.71 kg/m²     BP Readings from Last 3 Encounters:   09/29/22 (!) 170/90   08/25/22 (!) 141/70   06/30/22 128/82       Wt Readings from Last 3 Encounters:   09/29/22 246 lb (111.6 kg)   08/25/22 230 lb (104.3 kg)   06/30/22 243 lb 3.2 oz (110.3 kg)         Physical Exam  Constitutional:       General: She is not in acute distress. Appearance: Normal appearance. She is obese.  She is not ill-appearing. HENT:      Head: Normocephalic and atraumatic. Eyes:      General: No scleral icterus. Pupils: Pupils are equal, round, and reactive to light. Cardiovascular:      Rate and Rhythm: Normal rate and regular rhythm. Heart sounds: Normal heart sounds. No murmur heard. Pulmonary:      Effort: Pulmonary effort is normal.      Breath sounds: Normal breath sounds. No wheezing. Abdominal:      General: There is no distension. Palpations: Abdomen is soft. Tenderness: There is abdominal tenderness (lower abdominal tenderness). There is no right CVA tenderness or left CVA tenderness. Musculoskeletal:         General: Normal range of motion. Cervical back: Normal, normal range of motion and neck supple. No edema, deformity, rigidity or tenderness. Normal range of motion. Right lower leg: No edema. Left lower leg: No edema. Neurological:      General: No focal deficit present. Mental Status: She is alert and oriented to person, place, and time. Psychiatric:         Mood and Affect: Mood normal.         Behavior: Behavior normal.       ASSESSMENT/ PLAN:    1. Neck pain  - evaluated by the ED, normal Xray  -  No acute abnormality of the cervical spine. 2. Dysuria  - POCT Urinalysis no Micro    3. Acute cystitis with hematuria  - start:  - sulfamethoxazole-trimethoprim (BACTRIM DS;SEPTRA DS) 800-160 MG per tablet; Take 1 tablet by mouth 2 times daily for 7 days  Dispense: 14 tablet; Refill: 0  - already referred to the urology for recurrent UTI. - All old blood work reviewed with the patient  - Appropriate prescription are addressed. - After visit summery provided. - Questions answered and patient verbalizes understanding.  - Call for any problem, questions, or concerns. Return if symptoms worsen or fail to improve.

## 2022-10-05 ENCOUNTER — COMMUNITY OUTREACH (OUTPATIENT)
Dept: FAMILY MEDICINE CLINIC | Age: 62
End: 2022-10-05

## 2022-10-09 ASSESSMENT — ENCOUNTER SYMPTOMS
SHORTNESS OF BREATH: 0
BACK PAIN: 0
ABDOMINAL PAIN: 1
VOMITING: 0
COUGH: 0
NAUSEA: 0

## 2022-11-03 ENCOUNTER — OFFICE VISIT (OUTPATIENT)
Dept: FAMILY MEDICINE CLINIC | Age: 62
End: 2022-11-03
Payer: MEDICAID

## 2022-11-03 VITALS
HEART RATE: 80 BPM | SYSTOLIC BLOOD PRESSURE: 155 MMHG | HEIGHT: 66 IN | DIASTOLIC BLOOD PRESSURE: 82 MMHG | BODY MASS INDEX: 39.21 KG/M2 | WEIGHT: 244 LBS | OXYGEN SATURATION: 96 %

## 2022-11-03 DIAGNOSIS — Z00.00 MEDICARE ANNUAL WELLNESS VISIT, SUBSEQUENT: Primary | ICD-10-CM

## 2022-11-03 DIAGNOSIS — Z12.11 COLON CANCER SCREENING: ICD-10-CM

## 2022-11-03 DIAGNOSIS — I10 ESSENTIAL HYPERTENSION: ICD-10-CM

## 2022-11-03 PROCEDURE — 3078F DIAST BP <80 MM HG: CPT | Performed by: FAMILY MEDICINE

## 2022-11-03 PROCEDURE — G0439 PPPS, SUBSEQ VISIT: HCPCS | Performed by: FAMILY MEDICINE

## 2022-11-03 PROCEDURE — 3074F SYST BP LT 130 MM HG: CPT | Performed by: FAMILY MEDICINE

## 2022-11-03 SDOH — HEALTH STABILITY: PHYSICAL HEALTH: ON AVERAGE, HOW MANY DAYS PER WEEK DO YOU ENGAGE IN MODERATE TO STRENUOUS EXERCISE (LIKE A BRISK WALK)?: 5 DAYS

## 2022-11-03 SDOH — HEALTH STABILITY: PHYSICAL HEALTH: ON AVERAGE, HOW MANY MINUTES DO YOU ENGAGE IN EXERCISE AT THIS LEVEL?: 30 MIN

## 2022-11-03 SDOH — ECONOMIC STABILITY: FOOD INSECURITY: WITHIN THE PAST 12 MONTHS, YOU WORRIED THAT YOUR FOOD WOULD RUN OUT BEFORE YOU GOT MONEY TO BUY MORE.: PATIENT DECLINED

## 2022-11-03 SDOH — ECONOMIC STABILITY: FOOD INSECURITY: WITHIN THE PAST 12 MONTHS, THE FOOD YOU BOUGHT JUST DIDN'T LAST AND YOU DIDN'T HAVE MONEY TO GET MORE.: PATIENT DECLINED

## 2022-11-03 ASSESSMENT — PATIENT HEALTH QUESTIONNAIRE - PHQ9
SUM OF ALL RESPONSES TO PHQ QUESTIONS 1-9: 0
2. FEELING DOWN, DEPRESSED OR HOPELESS: 0
SUM OF ALL RESPONSES TO PHQ QUESTIONS 1-9: 0
SUM OF ALL RESPONSES TO PHQ QUESTIONS 1-9: 0
SUM OF ALL RESPONSES TO PHQ9 QUESTIONS 1 & 2: 0
1. LITTLE INTEREST OR PLEASURE IN DOING THINGS: 0
SUM OF ALL RESPONSES TO PHQ QUESTIONS 1-9: 0

## 2022-11-03 ASSESSMENT — LIFESTYLE VARIABLES
HOW MANY STANDARD DRINKS CONTAINING ALCOHOL DO YOU HAVE ON A TYPICAL DAY: PATIENT DOES NOT DRINK
HOW OFTEN DO YOU HAVE A DRINK CONTAINING ALCOHOL: NEVER
HOW MANY STANDARD DRINKS CONTAINING ALCOHOL DO YOU HAVE ON A TYPICAL DAY: 0
HOW OFTEN DO YOU HAVE SIX OR MORE DRINKS ON ONE OCCASION: 1
HOW OFTEN DO YOU HAVE A DRINK CONTAINING ALCOHOL: 1

## 2022-11-03 ASSESSMENT — SOCIAL DETERMINANTS OF HEALTH (SDOH): HOW HARD IS IT FOR YOU TO PAY FOR THE VERY BASICS LIKE FOOD, HOUSING, MEDICAL CARE, AND HEATING?: PATIENT DECLINED

## 2022-11-03 NOTE — PATIENT INSTRUCTIONS
Personalized Preventive Plan for [place] - 11/3/2022  Medicare offers a range of preventive health benefits. Some of the tests and screenings are paid in full while other may be subject to a deductible, co-insurance, and/or copay. Some of these benefits include a comprehensive review of your medical history including lifestyle, illnesses that may run in your family, and various assessments and screenings as appropriate. After reviewing your medical record and screening and assessments performed today your provider may have ordered immunizations, labs, imaging, and/or referrals for you. A list of these orders (if applicable) as well as your Preventive Care list are included within your After Visit Summary for your review. Other Preventive Recommendations:    A preventive eye exam performed by an eye specialist is recommended every 1-2 years to screen for glaucoma; cataracts, macular degeneration, and other eye disorders. A preventive dental visit is recommended every 6 months. Try to get at least 150 minutes of exercise per week or 10,000 steps per day on a pedometer . Order or download the FREE \"Exercise & Physical Activity: Your Everyday Guide\" from [employer]. Call 3-498.492.2103 or search [employer] online. You need 1730-1214 mg of calcium and 2086-9568 IU of vitamin D per day. It is possible to meet your calcium requirement with diet alone, but a vitamin D supplement is usually necessary to meet this goal.  When exposed to the sun, use a sunscreen that protects against both UVA and UVB radiation with an SPF of 30 or greater. Reapply every 2 to 3 hours or after sweating, drying off with a towel, or swimming. Always wear a seat belt when traveling in a car. Always wear a helmet when riding a bicycle or motorcycle.

## 2022-11-03 NOTE — PROGRESS NOTES
Medicare Annual Wellness Visit    [place] is here for Medicare AWV    Assessment & Plan   Medicare annual wellness visit, subsequent  Essential hypertension  Patient has no symptoms blood pressure little elevated today we recommend to check her blood pressure at home and call us if she has a reading 140/90 or above. She already taking the amlodipine and Tenormin  Colon cancer screening  -     Fecal DNA Colorectal cancer screening (Cologuard)    Recommendations for Preventive Services Due: see orders and patient instructions/AVS.  Recommended screening schedule for the next 5-10 years is provided to the patient in written form: see Patient Instructions/AVS.     Return for Medicare Annual Wellness Visit in 1 year. Subjective   The following acute and/or chronic problems were also addressed today:  Doing fine and has no complaints    Patient's complete Health Risk Assessment and screening values have been reviewed and are found in Flowsheets. The following problems were reviewed today and where indicated follow up appointments were made and/or referrals ordered.     Positive Risk Factor Screenings with Interventions:             General Health and ACP:  General  In general, how would you say your health is?: Fair  In the past 7 days, have you experienced any of the following: New or Increased Pain, New or Increased Fatigue, Loneliness, Social Isolation, Stress or Anger?: (!) Yes  Select all that apply: (!) New or Increased Pain, New or Increased Fatigue  Do you get the social and emotional support that you need?: Yes  Do you have a Living Will?: (!) No    Advance Directives       Power of  Living Will ACP-Advance Directive ACP-Power of     Not on File Not on File Not on File Not on File        General Health Risk Interventions:  Needs to work on the living will    Health Habits/Nutrition:  Physical Activity: Sufficiently Active    Days of Exercise per Week: 5 days    Minutes of Exercise per Session: 30 min     Have you lost any weight without trying in the past 3 months?: No  Body mass index: (!) 39.38  Have you seen the dentist within the past year?: N/A - wear dentures  Health Habits/Nutrition Interventions:  Has a denture    Hearing/Vision:  Do you or your family notice any trouble with your hearing that hasn't been managed with hearing aids?: (!) Yes  Do you have difficulty driving, watching TV, or doing any of your daily activities because of your eyesight?: No  Have you had an eye exam within the past year?: (!) No  No results found. Hearing/Vision Interventions:  Doing fine    Safety:  Do you have working smoke detectors?: Yes  Do you have any tripping hazards - loose or unsecured carpets or rugs?: (!) Yes  Do you have any tripping hazards - clutter in doorways, halls, or stairs?: No  Do you have either shower bars, grab bars, non-slip mats or non-slip surfaces in your shower or bathtub?: Yes  Do all of your stairways have a railing or banister?: Yes  Do you always fasten your seatbelt when you are in a car?: Yes  Safety Interventions:  Encourage walking           Objective   Vitals:    11/03/22 0806 11/03/22 0838   BP: (!) 160/80 (!) 155/82   Pulse: 80    SpO2: 96%    Weight: 244 lb (110.7 kg)    Height: 5' 6\" (1.676 m)       Body mass index is 39.38 kg/m². BP Readings from Last 3 Encounters:   11/03/22 (!) 155/82   09/29/22 (!) 170/90   08/25/22 (!) 141/70              Allergies   Allergen Reactions    Metoprolol      Pt states it gives her a HA    Naproxen      Prior to Visit Medications    Medication Sig Taking? Authorizing Provider   amLODIPine (NORVASC) 5 MG tablet TAKE 1 TABLET BY MOUTH DAILY Yes Rashmi Salgado MD   atorvastatin (LIPITOR) 20 MG tablet Take 1 tablet by mouth daily Yes Alejandro Chinchilla, APRN - CNP   atenolol (TENORMIN) 50 MG tablet TAKE 1 TABLET BY MOUTH TWICE DAILY Yes Rashmi Salgado MD   aspirin 81 MG chewable tablet Take 81 mg by mouth daily Yes Historical Provider, MD Krishnamurthy (Including outside providers/suppliers regularly involved in providing care):   Patient Care Team:  Rafiq Cazares MD as PCP - General (Family Medicine)  Rafiq Cazares MD as PCP - [facility] Empaneled Provider  Karen Porter MD as Consulting Physician (Obstetrics & Gynecology)     Reviewed and updated this visit:  Tobacco  Allergies  Meds  Med Hx  Surg Hx  Soc Hx  Fam Hx

## 2022-11-21 LAB — NONINV COLON CA DNA+OCC BLD SCRN STL QL: NORMAL

## 2023-01-05 ENCOUNTER — HOSPITAL ENCOUNTER (OUTPATIENT)
Age: 63
Setting detail: SPECIMEN
Discharge: HOME | End: 2023-01-05

## 2023-01-05 ENCOUNTER — OFFICE VISIT (OUTPATIENT)
Dept: OBGYN | Age: 63
End: 2023-01-05

## 2023-01-05 VITALS — WEIGHT: 240 LBS | BODY MASS INDEX: 38.74 KG/M2 | SYSTOLIC BLOOD PRESSURE: 141 MMHG | DIASTOLIC BLOOD PRESSURE: 85 MMHG

## 2023-01-05 DIAGNOSIS — N89.8 VAGINAL IRRITATION: ICD-10-CM

## 2023-01-05 DIAGNOSIS — R14.0 BLOATING: Primary | ICD-10-CM

## 2023-01-05 DIAGNOSIS — R10.2 PELVIC PRESSURE IN FEMALE: ICD-10-CM

## 2023-01-05 DIAGNOSIS — Z12.4 CERVICAL CANCER SCREENING: ICD-10-CM

## 2023-01-05 DIAGNOSIS — Z12.31 SCREENING MAMMOGRAM FOR BREAST CANCER: ICD-10-CM

## 2023-01-05 DIAGNOSIS — Z01.419 ENCOUNTER FOR ANNUAL ROUTINE GYNECOLOGICAL EXAMINATION: ICD-10-CM

## 2023-01-05 DIAGNOSIS — N95.2 ATROPHIC VAGINITIS: ICD-10-CM

## 2023-01-05 DIAGNOSIS — N74 FEMALE PELVIC INFLAMMATORY DISORDERS IN DISEASES CLASSIFIED ELSEWHERE: ICD-10-CM

## 2023-01-05 DIAGNOSIS — R39.15 URINARY URGENCY: ICD-10-CM

## 2023-01-05 LAB
BACTERIA: ABNORMAL /HPF
BILIRUBIN URINE: NEGATIVE
BILIRUBIN, POC: NORMAL
BLOOD URINE, POC: NORMAL
BLOOD, URINE: NEGATIVE
CLARITY, POC: NORMAL
CLARITY: CLEAR
COLOR, POC: NORMAL
COLOR: YELLOW
EPITHELIAL CELLS, UA: 5 /HPF (ref 0–5)
GLUCOSE URINE, POC: NORMAL
GLUCOSE URINE: NEGATIVE MG/DL
HYALINE CASTS: 0 /LPF (ref 0–8)
KETONES, POC: NORMAL
KETONES, URINE: NEGATIVE MG/DL
LEUKOCYTE EST, POC: NORMAL
LEUKOCYTE ESTERASE, URINE: ABNORMAL
MICROSCOPIC EXAMINATION: YES
NITRITE, POC: NORMAL
NITRITE, URINE: NEGATIVE
PH UA: 6 (ref 5–8)
PH, POC: NORMAL
PROTEIN UA: NEGATIVE MG/DL
PROTEIN, POC: NORMAL
RBC UA: 1 /HPF (ref 0–4)
SPECIFIC GRAVITY UA: 1.02 (ref 1–1.03)
SPECIFIC GRAVITY, POC: NORMAL
URINE TYPE: ABNORMAL
UROBILINOGEN, POC: NORMAL
UROBILINOGEN, URINE: 0.2 E.U./DL
WBC UA: 3 /HPF (ref 0–5)

## 2023-01-05 RX ORDER — ESTRADIOL 0.1 MG/G
1 CREAM VAGINAL
Qty: 3 EACH | Refills: 3 | Status: SHIPPED | OUTPATIENT
Start: 2023-01-06

## 2023-01-05 ASSESSMENT — ENCOUNTER SYMPTOMS
RESPIRATORY NEGATIVE: 1
EYES NEGATIVE: 1
ALLERGIC/IMMUNOLOGIC NEGATIVE: 1

## 2023-01-05 NOTE — PROGRESS NOTES
23    Eliseo Mcbride  1960    Chief Complaint   Patient presents with    Annual Exam     Pap 10/19/21 Neg. Mammo 21 [facility] Normal. Dexa  Normal bone density. Pt states she is waiting results on her cologuard. Colonoscopy 12+ years ago normal. Pt is not currently sexually active. Not hysterectomy. Other     Pt c/o vaginal itching. Pt also states she feels pressure in her lower abdominal area and feels like something is leaning on her bowels. Hx of uti. pt thinks she might have one currently. The patient is a 65 y.o. female,  who presents for her annual exam.  She is menopausal.  She is not taking HRT. Sherryle Moder She is  sexually active. She reports additional symptoms of recurrent urinary tract infections for which she is seeing Dr. Zohra Peterson of urology, vaginal irritation, bloating, urinary urgency and frequency . Pap smear history: Her last PAP smear was in . Her results were normal.    Breast history: her most recent mammogram was in . The results were: Normal    Osteoporosis Status: her bone density scan was in . The results were normal bone density    Colonoscopy Status: she had a colonoscopy in cologauard pending per patient. The results were pending . Past Medical History:   Diagnosis Date    Abnormal Pap smear of cervix     Arthritis     Fracture     right foot, left wrist, lumbar    H/O echocardiogram 10/14/2020    EF 55-60%,  No significant valvular disease noted. Heart disease     Hypertension     Kidney scarring     Osteoarthritis     Pelvic pressure in female     PONV (postoperative nausea and vomiting)     Rectal bleeding     Sensation of pressure in bladder area     UTI (urinary tract infection)     Vaginal itching        Past Surgical History:   Procedure Laterality Date    CARDIAC CATHETERIZATION      no stents.      SECTION      COLONOSCOPY      TUBAL LIGATION         Family History   Problem Relation Age of Onset    Heart Surgery Father     High Cholesterol Father         Stroke    Hypertension Father     Hypertension Sister     Hypertension Brother     Dementia Mother     High Cholesterol Mother         Dementia    Hypertension Sister        Social History     Tobacco Use    Smoking status: Never    Smokeless tobacco: Never   Vaping Use    Vaping Use: Never used   Substance Use Topics    Alcohol use: No     Comment: caffeine 1 cup of coffee a day    Drug use: No       Current Outpatient Medications   Medication Sig Dispense Refill    Probiotic Product (UP4 PROBIOTICS ADULT PO) Take by mouth      [START ON 2023] estradiol (ESTRACE VAGINAL) 0.1 MG/GM vaginal cream Place 1 g vaginally three times a week Use 1 g vaginally nightly for 2 weeks, then 1 g nightly 2-3 times per week. 3 each 3    atorvastatin (LIPITOR) 20 MG tablet Take 1 tablet by mouth daily 90 tablet 3    atenolol (TENORMIN) 50 MG tablet TAKE 1 TABLET BY MOUTH TWICE DAILY 180 tablet 3    aspirin 81 MG chewable tablet Take 81 mg by mouth daily      amLODIPine (NORVASC) 5 MG tablet TAKE 1 TABLET BY MOUTH DAILY (Patient not taking: Reported on 2023) 90 tablet 3     No current facility-administered medications for this visit. Allergies   Allergen Reactions    Metoprolol      Pt states it gives her a HA    Naproxen              There is no immunization history on file for this patient. Review of Systems   Constitutional: Negative. HENT: Negative. Eyes: Negative. Respiratory: Negative. Cardiovascular: Negative. Gastrointestinal:         Bloating   Endocrine: Negative. Genitourinary:  Positive for vaginal pain. Musculoskeletal: Negative. Skin: Negative. Allergic/Immunologic: Negative. Neurological: Negative. Hematological: Negative. Psychiatric/Behavioral: Negative. BP (!) 141/85   Wt 240 lb (108.9 kg)   BMI 38.74 kg/m²     Physical Exam  Exam conducted with a chaperone present.    Constitutional:       Appearance: Normal appearance. HENT:      Head: Normocephalic and atraumatic. Nose: Nose normal.   Eyes:      Conjunctiva/sclera: Conjunctivae normal.   Cardiovascular:      Rate and Rhythm: Normal rate. Pulses: Normal pulses. Pulmonary:      Effort: Pulmonary effort is normal.   Chest:   Breasts:     Right: Normal.      Left: Normal.   Abdominal:      General: Abdomen is flat. Bowel sounds are normal.      Palpations: Abdomen is soft. Hernia: There is no hernia in the left inguinal area or right inguinal area. Genitourinary:     General: Normal vulva. Exam position: Lithotomy position. Labia:         Right: No rash, tenderness or lesion. Left: No rash, tenderness or lesion. Urethra: No prolapse. Vagina: Normal. No foreign body. No vaginal discharge, erythema, tenderness, bleeding, lesions or prolapsed vaginal walls. Cervix: No cervical motion tenderness, discharge, friability, lesion, erythema or cervical bleeding. Uterus: Normal. Not enlarged, not tender and no uterine prolapse. Adnexa: Right adnexa normal and left adnexa normal.        Right: No mass, tenderness or fullness. Left: No mass, tenderness or fullness. Musculoskeletal:      Cervical back: Normal range of motion and neck supple. Lymphadenopathy:      Upper Body:      Right upper body: No supraclavicular, axillary or pectoral adenopathy. Left upper body: No supraclavicular, axillary or pectoral adenopathy. Skin:     General: Skin is warm. Coloration: Skin is not ashen or cyanotic. Findings: No abrasion, abscess or bruising. Rash is not crusting or macular. Neurological:      Mental Status: She is alert and oriented to person, place, and time.        Results for orders placed or performed in visit on 01/05/23   POCT Urinalysis no Micro   Result Value Ref Range    Color, UA      Clarity, UA      Glucose, UA POC neg     Bilirubin, UA      Ketones, UA      Spec Grav, UA Blood, UA POC neg     pH, UA      Protein, UA POC neg     Urobilinogen, UA      Leukocytes, UA 1+ 70     Nitrite, UA neg        Assessment and Plan   Diagnosis Orders   1. Bloating  US NON OB TRANSVAGINAL    Urinalysis with Microscopic      2. Pelvic pressure in female  Probiotic Product (UP4 PROBIOTICS ADULT PO)    POCT Urinalysis no Micro    Urinalysis with Microscopic      3. Encounter for annual routine gynecological examination  PAP SMEAR      4. Screening mammogram for breast cancer  JOSE DANIEL DIGITAL SCREEN W OR WO CAD BILATERAL      5. Cervical cancer screening  PAP SMEAR      6. Urinary urgency  Culture, Urine    Urinalysis with Microscopic      7. Vaginal irritation  Vaginal Pathogens Probes *A      8. Female pelvic inflammatory disorders in diseases classified elsewhere  Vaginal Pathogens Probes *A      9. Atrophic vaginitis  estradiol (ESTRACE VAGINAL) 0.1 MG/GM vaginal cream      Discussed that the atrophic vaginitis may be a cause of many of her symptoms including the urinary urgency, vaginal irritation, frequent UTIs. We discussed the risk and benefits of vaginal Estrace cream and she desires a trial.  In addition due to her abdominal bloating we will perform a pelvic ultrasound to ensure there is no ovarian cystic masses. No follow-ups on file.     Jojo Ramsay MD

## 2023-01-06 LAB
CANDIDA SPECIES, DNA PROBE: NORMAL
GARDNERELLA VAGINALIS, DNA PROBE: NORMAL
ORGANISM: ABNORMAL
TRICHOMONAS VAGINALIS DNA: NORMAL
URINE CULTURE, ROUTINE: ABNORMAL

## 2023-02-08 SDOH — ECONOMIC STABILITY: TRANSPORTATION INSECURITY
IN THE PAST 12 MONTHS, HAS LACK OF TRANSPORTATION KEPT YOU FROM MEETINGS, WORK, OR FROM GETTING THINGS NEEDED FOR DAILY LIVING?: PATIENT DECLINED

## 2023-02-08 SDOH — ECONOMIC STABILITY: FOOD INSECURITY: WITHIN THE PAST 12 MONTHS, YOU WORRIED THAT YOUR FOOD WOULD RUN OUT BEFORE YOU GOT MONEY TO BUY MORE.: PATIENT DECLINED

## 2023-02-08 SDOH — ECONOMIC STABILITY: HOUSING INSECURITY
IN THE LAST 12 MONTHS, WAS THERE A TIME WHEN YOU DID NOT HAVE A STEADY PLACE TO SLEEP OR SLEPT IN A SHELTER (INCLUDING NOW)?: PATIENT REFUSED

## 2023-02-08 SDOH — ECONOMIC STABILITY: FOOD INSECURITY: WITHIN THE PAST 12 MONTHS, THE FOOD YOU BOUGHT JUST DIDN'T LAST AND YOU DIDN'T HAVE MONEY TO GET MORE.: PATIENT DECLINED

## 2023-02-08 SDOH — ECONOMIC STABILITY: INCOME INSECURITY: HOW HARD IS IT FOR YOU TO PAY FOR THE VERY BASICS LIKE FOOD, HOUSING, MEDICAL CARE, AND HEATING?: PATIENT DECLINED

## 2023-02-09 ENCOUNTER — OFFICE VISIT (OUTPATIENT)
Dept: FAMILY MEDICINE CLINIC | Age: 63
End: 2023-02-09
Payer: MEDICARE

## 2023-02-09 VITALS
SYSTOLIC BLOOD PRESSURE: 140 MMHG | WEIGHT: 241 LBS | OXYGEN SATURATION: 98 % | HEART RATE: 66 BPM | DIASTOLIC BLOOD PRESSURE: 85 MMHG | HEIGHT: 67 IN | BODY MASS INDEX: 37.83 KG/M2

## 2023-02-09 DIAGNOSIS — K21.9 GASTROESOPHAGEAL REFLUX DISEASE WITHOUT ESOPHAGITIS: ICD-10-CM

## 2023-02-09 DIAGNOSIS — Z12.11 COLON CANCER SCREENING: ICD-10-CM

## 2023-02-09 DIAGNOSIS — R73.03 PREDIABETES: ICD-10-CM

## 2023-02-09 DIAGNOSIS — E78.2 MIXED HYPERLIPIDEMIA: ICD-10-CM

## 2023-02-09 DIAGNOSIS — I10 ESSENTIAL HYPERTENSION: Primary | ICD-10-CM

## 2023-02-09 LAB — HBA1C MFR BLD: 6.4 %

## 2023-02-09 PROCEDURE — G8484 FLU IMMUNIZE NO ADMIN: HCPCS | Performed by: FAMILY MEDICINE

## 2023-02-09 PROCEDURE — 3017F COLORECTAL CA SCREEN DOC REV: CPT | Performed by: FAMILY MEDICINE

## 2023-02-09 PROCEDURE — G8427 DOCREV CUR MEDS BY ELIG CLIN: HCPCS | Performed by: FAMILY MEDICINE

## 2023-02-09 PROCEDURE — G8417 CALC BMI ABV UP PARAM F/U: HCPCS | Performed by: FAMILY MEDICINE

## 2023-02-09 PROCEDURE — 1036F TOBACCO NON-USER: CPT | Performed by: FAMILY MEDICINE

## 2023-02-09 PROCEDURE — 3077F SYST BP >= 140 MM HG: CPT | Performed by: FAMILY MEDICINE

## 2023-02-09 PROCEDURE — 3079F DIAST BP 80-89 MM HG: CPT | Performed by: FAMILY MEDICINE

## 2023-02-09 PROCEDURE — 83036 HEMOGLOBIN GLYCOSYLATED A1C: CPT | Performed by: FAMILY MEDICINE

## 2023-02-09 PROCEDURE — 99214 OFFICE O/P EST MOD 30 MIN: CPT | Performed by: FAMILY MEDICINE

## 2023-02-09 RX ORDER — OMEPRAZOLE 20 MG/1
20 CAPSULE, DELAYED RELEASE ORAL
Qty: 30 CAPSULE | Refills: 5 | Status: SHIPPED | OUTPATIENT
Start: 2023-02-09 | End: 2023-02-10

## 2023-02-09 ASSESSMENT — PATIENT HEALTH QUESTIONNAIRE - PHQ9
5. POOR APPETITE OR OVEREATING: 1
SUM OF ALL RESPONSES TO PHQ QUESTIONS 1-9: 6
9. THOUGHTS THAT YOU WOULD BE BETTER OFF DEAD, OR OF HURTING YOURSELF: 0
8. MOVING OR SPEAKING SO SLOWLY THAT OTHER PEOPLE COULD HAVE NOTICED. OR THE OPPOSITE, BEING SO FIGETY OR RESTLESS THAT YOU HAVE BEEN MOVING AROUND A LOT MORE THAN USUAL: 0
SUM OF ALL RESPONSES TO PHQ9 QUESTIONS 1 & 2: 2
2. FEELING DOWN, DEPRESSED OR HOPELESS: 1
SUM OF ALL RESPONSES TO PHQ QUESTIONS 1-9: 6
1. LITTLE INTEREST OR PLEASURE IN DOING THINGS: 1
SUM OF ALL RESPONSES TO PHQ QUESTIONS 1-9: 6
7. TROUBLE CONCENTRATING ON THINGS, SUCH AS READING THE NEWSPAPER OR WATCHING TELEVISION: 0
10. IF YOU CHECKED OFF ANY PROBLEMS, HOW DIFFICULT HAVE THESE PROBLEMS MADE IT FOR YOU TO DO YOUR WORK, TAKE CARE OF THINGS AT HOME, OR GET ALONG WITH OTHER PEOPLE: 0
SUM OF ALL RESPONSES TO PHQ QUESTIONS 1-9: 6
4. FEELING TIRED OR HAVING LITTLE ENERGY: 1
6. FEELING BAD ABOUT YOURSELF - OR THAT YOU ARE A FAILURE OR HAVE LET YOURSELF OR YOUR FAMILY DOWN: 0
3. TROUBLE FALLING OR STAYING ASLEEP: 2
DEPRESSION UNABLE TO ASSESS: FUNCTIONAL CAPACITY MOTIVATION LIMITS ACCURACY

## 2023-02-09 ASSESSMENT — ENCOUNTER SYMPTOMS
SHORTNESS OF BREATH: 0
ABDOMINAL PAIN: 0
BACK PAIN: 0
COUGH: 0

## 2023-02-09 NOTE — PROGRESS NOTES
Chace Han BEKAH Karthik Tri Mcgee  1960  02/09/23    Chief Complaint   Patient presents with    Follow-up    Hypertension    Hyperlipidemia    Other     prediabetic           Patient here for 6 months f/u regarding HTN, HLD, prediabetic, The patient is taking hypertensive medications, but stats the losartan make her tired and fatigue. Denies chest pain, dyspnea, edema, or TIA's,  She is taking cholesterol medication with no side effects and watch diet for her BS. Patient would like to get medication for acid reflux         Past Medical History:   Diagnosis Date    Abnormal Pap smear of cervix     Arthritis     Fracture     right foot, left wrist, lumbar    H/O echocardiogram 10/14/2020    EF 55-60%,  No significant valvular disease noted. Heart disease     Hypertension     Kidney scarring     Osteoarthritis     Pelvic pressure in female     PONV (postoperative nausea and vomiting)     Rectal bleeding     Sensation of pressure in bladder area     UTI (urinary tract infection)     Vaginal itching      Past Surgical History:   Procedure Laterality Date    CARDIAC CATHETERIZATION      no stents.      SECTION      COLONOSCOPY      TUBAL LIGATION       Family History   Problem Relation Age of Onset    Heart Surgery Father     High Cholesterol Father         Stroke    Hypertension Father     Hypertension Sister     Hypertension Brother     Dementia Mother     High Cholesterol Mother         Dementia    Hypertension Sister      Social History     Socioeconomic History    Marital status: Legally      Spouse name: Not on file    Number of children: Not on file    Years of education: Not on file    Highest education level: Not on file   Occupational History    Not on file   Tobacco Use    Smoking status: Never    Smokeless tobacco: Never   Vaping Use    Vaping Use: Never used   Substance and Sexual Activity    Alcohol use: No     Comment: caffeine 1 cup of coffee a day    Drug use: No    Sexual activity: Never Other Topics Concern    Not on file   Social History Narrative    Not on file     Social Determinants of Health     Financial Resource Strain: Unknown    Difficulty of Paying Living Expenses: Patient refused   Food Insecurity: Unknown    Worried About Running Out of Food in the Last Year: Patient refused    [address] in the Last Year: Patient refused   Transportation Needs: Unknown    Lack of Transportation (Medical): Not on file    Lack of Transportation (Non-Medical): Patient refused   Physical Activity: Sufficiently Active    Days of Exercise per Week: 5 days    Minutes of Exercise per Session: 30 min   Stress: Not on file   Social Connections: Not on file   Intimate Partner Violence: Not on file   Housing Stability: Unknown    Unable to Pay for Housing in the Last Year: Not on file    Number of [place] in the Last Year: Not on file    Unstable Housing in the Last Year: Patient refused       Allergies   Allergen Reactions    Metoprolol      Pt states it gives her a HA    Naproxen      Current Outpatient Medications   Medication Sig Dispense Refill    omeprazole (PRILOSEC) 20 MG delayed release capsule Take 1 capsule by mouth every morning (before breakfast) 30 capsule 5    Probiotic Product (UP4 PROBIOTICS ADULT PO) Take by mouth      estradiol (ESTRACE VAGINAL) 0.1 MG/GM vaginal cream Place 1 g vaginally three times a week Use 1 g vaginally nightly for 2 weeks, then 1 g nightly 2-3 times per week. 3 each 3    amLODIPine (NORVASC) 5 MG tablet TAKE 1 TABLET BY MOUTH DAILY 90 tablet 3    atorvastatin (LIPITOR) 20 MG tablet Take 1 tablet by mouth daily 90 tablet 3    atenolol (TENORMIN) 50 MG tablet TAKE 1 TABLET BY MOUTH TWICE DAILY 180 tablet 3    aspirin 81 MG chewable tablet Take 81 mg by mouth daily       No current facility-administered medications for this visit. Review of Systems   Constitutional:  Negative for activity change, chills and fever.    Respiratory:  Negative for cough and shortness of breath. Cardiovascular:  Negative for chest pain and leg swelling. Gastrointestinal:  Negative for abdominal pain. Genitourinary:  Negative for dysuria and flank pain. Musculoskeletal:  Negative for back pain. Skin:  Negative for rash. Neurological:  Negative for dizziness and headaches. Psychiatric/Behavioral:  Negative for agitation and sleep disturbance. The patient is not nervous/anxious.       Lab Results   Component Value Date    WBC 5.5 05/20/2022    HGB 12.3 05/20/2022    HCT 36.4 05/20/2022    MCV 87.0 05/20/2022     05/20/2022     Lab Results   Component Value Date     05/20/2022    K 4.3 05/20/2022     05/20/2022    CO2 19 (L) 05/20/2022    BUN 11 05/20/2022    CREATININE 0.7 05/20/2022    GLUCOSE 84 02/09/2021    CALCIUM 9.5 05/20/2022    PROT 7.8 05/20/2022    LABALBU 4.4 05/20/2022    BILITOT 0.4 05/20/2022    ALKPHOS 115 05/20/2022    AST 15 05/20/2022    ALT 13 05/20/2022    LABGLOM >60 05/20/2022    GFRAA >60 05/20/2022    AGRATIO 1.3 05/20/2022    GLOB 4.1 01/28/2020     Lab Results   Component Value Date    CHOL 163 05/20/2022    CHOL 233 (H) 09/17/2020    CHOL 254 (H) 01/28/2020     Lab Results   Component Value Date    TRIG 56 05/20/2022    TRIG 75 09/17/2020    TRIG 68 01/28/2020     Lab Results   Component Value Date    HDL 73 (H) 05/20/2022    HDL 68 (H) 09/17/2020    HDL 81 (H) 01/28/2020     Lab Results   Component Value Date    LDLCALC 79 05/20/2022    LDLCALC 150 (H) 09/17/2020    LDLCALC 159 (H) 01/28/2020     Lab Results   Component Value Date    LABA1C 6.4 02/09/2023     Lab Results   Component Value Date    TSH 1.65 01/28/2020    TSHHS 1.250 12/17/2018         BP (!) 140/85   Pulse 66   Ht 5' 7\" (1.702 m)   Wt 241 lb (109.3 kg)   SpO2 98%   BMI 37.75 kg/m²     BP Readings from Last 3 Encounters:   02/09/23 (!) 140/85   01/05/23 (!) 141/85   11/03/22 (!) 155/82       Wt Readings from Last 3 Encounters:   02/09/23 241 lb (109.3 kg) 01/05/23 240 lb (108.9 kg)   11/03/22 244 lb (110.7 kg)         Physical Exam  Constitutional:       General: She is not in acute distress. Appearance: Normal appearance. She is obese. She is not ill-appearing. HENT:      Head: Normocephalic and atraumatic. Eyes:      General: No scleral icterus. Pupils: Pupils are equal, round, and reactive to light. Cardiovascular:      Rate and Rhythm: Normal rate and regular rhythm. Heart sounds: Normal heart sounds. No murmur heard. Pulmonary:      Effort: Pulmonary effort is normal.      Breath sounds: Normal breath sounds. No wheezing. Musculoskeletal:         General: Normal range of motion. Cervical back: Normal range of motion and neck supple. No rigidity. Right lower leg: No edema. Neurological:      General: No focal deficit present. Mental Status: She is alert and oriented to person, place, and time. Psychiatric:         Mood and Affect: Mood normal.         Behavior: Behavior normal.       ASSESSMENT/ PLAN:    1. Essential hypertension  - home readingare okay 120/75 so will keep same medication the norvasc and the atenolol    2. Mixed hyperlipidemia  - on lipitor, last LDL 73 on 5/22    3. Prediabetes  - last A1C 5.9 on8/21 today went up recommend to watch diet and loss wt  - POCT glycosylated hemoglobin (Hb A1C)    4. Gastroesophageal reflux disease without esophagitis  - try the omeprazole    5. Colon cancer screening  - the fit test failed at home  - Clara House MD, Gastroenterology, [address] old blood work reviewed with the patient  - Appropriate prescription are addressed. - After visit summery provided. - Questions answered and patient verbalizes understanding.  - Call for any problem, questions, or concerns. Return in about 6 months (around 8/9/2023).

## 2023-02-10 RX ORDER — OMEPRAZOLE 20 MG/1
CAPSULE, DELAYED RELEASE ORAL
Qty: 90 CAPSULE | Refills: 3 | Status: SHIPPED | OUTPATIENT
Start: 2023-02-10

## 2023-02-15 ENCOUNTER — OFFICE VISIT (OUTPATIENT)
Dept: CARDIOLOGY CLINIC | Age: 63
End: 2023-02-15
Payer: MEDICARE

## 2023-02-15 VITALS
WEIGHT: 241.8 LBS | DIASTOLIC BLOOD PRESSURE: 80 MMHG | SYSTOLIC BLOOD PRESSURE: 138 MMHG | HEART RATE: 74 BPM | HEIGHT: 67 IN | BODY MASS INDEX: 37.95 KG/M2

## 2023-02-15 DIAGNOSIS — E78.5 DYSLIPIDEMIA: ICD-10-CM

## 2023-02-15 DIAGNOSIS — R06.02 SHORTNESS OF BREATH: ICD-10-CM

## 2023-02-15 DIAGNOSIS — R00.2 PALPITATIONS: Primary | ICD-10-CM

## 2023-02-15 DIAGNOSIS — I10 ESSENTIAL HYPERTENSION: ICD-10-CM

## 2023-02-15 PROCEDURE — 99214 OFFICE O/P EST MOD 30 MIN: CPT | Performed by: INTERNAL MEDICINE

## 2023-02-15 PROCEDURE — 3017F COLORECTAL CA SCREEN DOC REV: CPT | Performed by: INTERNAL MEDICINE

## 2023-02-15 PROCEDURE — G8417 CALC BMI ABV UP PARAM F/U: HCPCS | Performed by: INTERNAL MEDICINE

## 2023-02-15 PROCEDURE — 3075F SYST BP GE 130 - 139MM HG: CPT | Performed by: INTERNAL MEDICINE

## 2023-02-15 PROCEDURE — 1036F TOBACCO NON-USER: CPT | Performed by: INTERNAL MEDICINE

## 2023-02-15 PROCEDURE — 3079F DIAST BP 80-89 MM HG: CPT | Performed by: INTERNAL MEDICINE

## 2023-02-15 PROCEDURE — G8484 FLU IMMUNIZE NO ADMIN: HCPCS | Performed by: INTERNAL MEDICINE

## 2023-02-15 PROCEDURE — G8427 DOCREV CUR MEDS BY ELIG CLIN: HCPCS | Performed by: INTERNAL MEDICINE

## 2023-02-15 NOTE — PROGRESS NOTES
Rosendo Mohamud MD                                  CARDIOLOGY  NOTE       Chief Complaint:    Follow up palpitations     Rare Flutter sensation in the chest   Rare intermittent chest discomfort/pinching sensation   Shortness of breath - improved      [facility] 2/15/2021      Conclusions     Normal coronary angiogram   Normal LVEF with no WMA      Recommendations   Cont medical management   Risk Factor Modifications        Stress Test 1/13/2021     Summary    Supervising physician Dr. Henrietta Barnett . Small sized defect of moderate severity which is persistent involving    anterior wall of myocardium likely secondary to breast artifact however    anterior wall infarct can not be ruled out. No reversible defect noted. Normal EF 77 % with normal ventricular contractility. Recommendation    Probably normal Stress MPI with anterior wall breast attenuation. Normal wall motion with preserved LVEF    Discuss the results with patient as outpatient         ECHOCARDIOGRAM 10/14/2020     Summary   Left ventricular function is normal, EF is estimated at 55-60%. Mild left ventricular hypertrophy. E/A reversal; indeterminate diastolic function. Mild tricuspid regurgitation. No significant valvular disease noted. Left ventricular function is normal, EF is estimated at 55-60%. Mild left ventricular hypertrophy. E/A reversal; indeterminate diastolic function. No evidence of pericardial effusion. HOLTER  MONITOR 10/1/2020     Patient Name: Jeremi Cuevas Record Number: [id number]  Date: 10/12/2020     62 y.o. 1960     INDICATIONS:  Palpitations     FINDINGS:  The patient had predominantly sinus rhythm. Heart rate varied from 48 bpm to 99 bpm.  The patients average heart rate was 67 bpm.    The patient had a holter monitor tracing done for 48 hour(s)  The patient had 3195 ventricular ectopic activity. All single beats, no runs noted. 1.75 % total burden.    The patient had 48 supraventricular ectopy. The longest R/R interval was 1.42 seconds. No other major arrhythmia was noted. Events of fluttering in chest correspond to Vent. Ectopies and Normal Sinus Rhythm      CONCLUSION:    Normal Sinus Rhythm   Rare Ventricular Ectopies with burden of 1.75%     RECOMMENDATIONS:      Medical Management. Recent Cardiac HPI:     Alex Sierra is a 65y.o. year old female with past medical history significant for hypertension, back pain presents to the clinic with a chief complaint of fluttering sensation in the chest.  According to the patient, she has very frequent fluttering sensation originating in the abdomen/epigastrium and radiating up the chest.  The frequency of the sensations is every 1-2 hours the usually last for few minutes. Patient denies any presyncope syncope. Patient denies any particular chest pain. Patient denies palpitations  She does complain of occasional shortness of breath and leg edema    Patient has been hypertensive and has been maintained on atenolol 50 mg twice a day. Recently she was started on lisinopril 10 mg by PCP however patient did not take due to dizziness. In the past patient was maintained on amlodipine but due to leg edema she stopped that. She was also on hydrochlorothiazide and she stopped that secondary to hair loss. In the office today her blood pressure is 140/80. Does mention that she checks her blood pressure at home 2-3 times a day and usually her blood pressure is in the 130s to 826L range systolic.     Patient denies any further complaints    EKG office today shows normal sinus rhythm          Current Outpatient Medications   Medication Sig Dispense Refill    Probiotic Product (UP4 PROBIOTICS ADULT PO) Take by mouth      amLODIPine (NORVASC) 5 MG tablet TAKE 1 TABLET BY MOUTH DAILY 90 tablet 3    atorvastatin (LIPITOR) 20 MG tablet Take 1 tablet by mouth daily 90 tablet 3    atenolol (TENORMIN) 50 MG tablet TAKE 1 TABLET BY MOUTH TWICE DAILY 180 tablet 3    omeprazole (PRILOSEC) 20 MG delayed release capsule TAKE 1 CAPSULE BY MOUTH EVERY MORNING BEFORE BREAKFAST (Patient not taking: Reported on 2/15/2023) 90 capsule 3    estradiol (ESTRACE VAGINAL) 0.1 MG/GM vaginal cream Place 1 g vaginally three times a week Use 1 g vaginally nightly for 2 weeks, then 1 g nightly 2-3 times per week. (Patient not taking: Reported on 2/15/2023) 3 each 3    aspirin 81 MG chewable tablet Take 81 mg by mouth daily (Patient not taking: Reported on 2/15/2023)       No current facility-administered medications for this visit. Allergies:     Metoprolol and Naproxen    Patient History:    Past Medical History:   Diagnosis Date    Abnormal Pap smear of cervix     Arthritis     Fracture     right foot, left wrist, lumbar    H/O echocardiogram 10/14/2020    EF 55-60%,  No significant valvular disease noted. Heart disease     Hypertension     Kidney scarring     Osteoarthritis     Pelvic pressure in female     PONV (postoperative nausea and vomiting)     Rectal bleeding     Sensation of pressure in bladder area     UTI (urinary tract infection)     Vaginal itching      Past Surgical History:   Procedure Laterality Date    CARDIAC CATHETERIZATION      no stents.      SECTION      COLONOSCOPY      TUBAL LIGATION       Family History   Problem Relation Age of Onset    Heart Surgery Father     High Cholesterol Father         Stroke    Hypertension Father     Hypertension Sister     Hypertension Brother     Dementia Mother     High Cholesterol Mother         Dementia    Hypertension Sister      Social History     Tobacco Use    Smoking status: Never    Smokeless tobacco: Never   Substance Use Topics    Alcohol use: No     Comment: caffeine 1 cup of coffee a day        Review of Systems:     Constitutional:  No Fever or Weight Loss   Eyes: No Decreased Vision  ENT: No Headaches, Hearing Loss or Vertigo  Cardiovascular: No Chest Pain,  + Shortness of breath, No Palpitations. No Edema. Fluttering sensation in the chest  Respiratory: No cough or wheezing . No Respiratory distress   Gastrointestinal: No abdominal pain, appetite loss, blood in stools, constipation, diarrhea or heartburn  Genitourinary: No dysuria, trouble voiding, or hematuria  Musculoskeletal:  denies any new  joint aches , or pain   Integumentary: No rash or pruritis  Neurological: No TIA or stroke symptoms  Psychiatric: No anxiety or depression  Endocrine: No malaise, fatigue or temperature intolerance  Hematologic/Lymphatic: No bleeding problems, blood clots or swollen lymph nodes  Allergic/Immunologic: No nasal congestion or hives        Objective:      Physical Exam:    /80 (Site: Left Upper Arm, Position: Sitting, Cuff Size: Large Adult)   Pulse 74   Ht 5' 7\" (1.702 m)   Wt 241 lb 12.8 oz (109.7 kg)   BMI 37.87 kg/m²   Wt Readings from Last 3 Encounters:   02/15/23 241 lb 12.8 oz (109.7 kg)   02/09/23 241 lb (109.3 kg)   01/05/23 240 lb (108.9 kg)     Body mass index is 37.87 kg/m². Vitals:    02/15/23 0951   BP: 138/80   Pulse: 74        General Appearance and Constitutional: Conversant, Well developed, Well nourished, No acute distress, Non-toxic appearance. HEENT:  Normocephalic, Atraumatic, Bilateral external ears normal, Oropharynx moist, No oral exudates,   Nose normal.   Neck- Normal range of motion, No tenderness, Supple  Eyes:  Conjunctiva normal, No discharge. Respiratory:  Normal breath sounds, No respiratory distress, No wheezing, No Rales, No Ronchi. No chest tenderness. Cardiovascular: S1-S2, no added heart sounds, No Mumurs appreciated. No gallops, rubs. No Pedal Edema   GI:  Bowel sounds normal, Soft, No tenderness,  :  No costovertebral angle tenderness   Musculoskeletal:  No gross deformities.  Back- No tenderness  Integument:  Well hydrated, no rash   Lymphatic:  No lymphadenopathy noted   Neurologic:  Alert & oriented x 3, Normal motor function, normal sensory function, no focal deficits noted   Psychiatric:  Speech and behavior appropriate       Medical decision making and Data review:    DATA:    Lab Results   Component Value Date    TROPONINT <0.010 12/17/2018     BNP:  No results found for: PROBNP  PT/INR:  No results found for: PTINR  Lab Results   Component Value Date    LABA1C 6.4 02/09/2023    LABA1C 5.9 08/05/2021     Lab Results   Component Value Date    CHOL 163 05/20/2022    TRIG 56 05/20/2022    HDL 73 (H) 05/20/2022    LDLCALC 79 05/20/2022     Lab Results   Component Value Date    WBC 5.5 05/20/2022    HGB 12.3 05/20/2022    HCT 36.4 05/20/2022    MCV 87.0 05/20/2022     05/20/2022     TSH:   Lab Results   Component Value Date    TSH 1.65 01/28/2020     Lab Results   Component Value Date    AST 15 05/20/2022    ALT 13 05/20/2022    BILITOT 0.4 05/20/2022    ALKPHOS 115 05/20/2022         All labs, medications and tests reviewed by myself including data and history from outside source , patient and available family . 1. Palpitations    2. Shortness of breath    3. Essential hypertension    4. Dyslipidemia           Impression and Plan:      Palpitations / PVCs:  EKG shows normal sinus rhythm. 48 hours Holter monitor 10/2020 - shows rare PVCs accounting for 1.75% total QRS. On Atenolol    Could not tolerate metoprolol. Chest pains - non cardiac     Rare PVCs. Stress test shows anterior wall perfusion defect, left heart Cath normal.  Risk Factor Modification. Shortness of breath, improved: Patient has mild dyspnea on exertion - Echocardiogram is within normal limits. Likely secondary to debility    Hyperlipidemia: Cont Lipitor 20 mg po daily     Essential hypertension: Better controlled. Cont Norvasc 5 mg daily /atenolol 50 mg daily. Obesity BMI 37.87: Diet and Exercise - Low Fat Diet / Counseled about weight loss and exercise. Return in about 1 year (around 2/15/2024).        Counseled extensively and medication compliance urged. We discussed that for the  prevention of ASCVD our  goal is aggressive risk modification. Patient is encouraged to exercise even a brisk walk for 30 minutes  at least 3 to 4 times a week   Various goals were discussed and questions answered. Continue current medications. Appropriate prescriptions are addressed and refills ordered. Questions answered and patient verbalizes understanding. Call for any problems, questions, or concerns.

## 2023-02-17 ENCOUNTER — TELEPHONE (OUTPATIENT)
Dept: GASTROENTEROLOGY | Age: 63
End: 2023-02-17

## 2023-02-24 ENCOUNTER — TELEPHONE (OUTPATIENT)
Dept: GASTROENTEROLOGY | Age: 63
End: 2023-02-24

## 2023-02-24 NOTE — TELEPHONE ENCOUNTER
Called and LM for pt. To inform her that b/c she has already been seen by dr. Meera Danielle she would not have to keep her 4/6/23 appt for a colon screening and I could just go ahead and schedule the procedure.

## 2023-04-06 ENCOUNTER — OFFICE VISIT (OUTPATIENT)
Dept: GASTROENTEROLOGY | Age: 63
End: 2023-04-06
Payer: MEDICARE

## 2023-04-06 VITALS
SYSTOLIC BLOOD PRESSURE: 144 MMHG | HEIGHT: 67 IN | OXYGEN SATURATION: 99 % | HEART RATE: 65 BPM | BODY MASS INDEX: 37.73 KG/M2 | DIASTOLIC BLOOD PRESSURE: 85 MMHG | WEIGHT: 240.4 LBS | TEMPERATURE: 97.2 F

## 2023-04-06 DIAGNOSIS — K62.5 RECTAL BLEED: Primary | ICD-10-CM

## 2023-04-06 PROCEDURE — 99213 OFFICE O/P EST LOW 20 MIN: CPT | Performed by: SPECIALIST

## 2023-04-06 PROCEDURE — 3017F COLORECTAL CA SCREEN DOC REV: CPT | Performed by: SPECIALIST

## 2023-04-06 PROCEDURE — 3077F SYST BP >= 140 MM HG: CPT | Performed by: SPECIALIST

## 2023-04-06 PROCEDURE — G8417 CALC BMI ABV UP PARAM F/U: HCPCS | Performed by: SPECIALIST

## 2023-04-06 PROCEDURE — G8427 DOCREV CUR MEDS BY ELIG CLIN: HCPCS | Performed by: SPECIALIST

## 2023-04-06 PROCEDURE — 1036F TOBACCO NON-USER: CPT | Performed by: SPECIALIST

## 2023-04-06 PROCEDURE — 3079F DIAST BP 80-89 MM HG: CPT | Performed by: SPECIALIST

## 2023-04-06 NOTE — PROGRESS NOTES
Chang Vinson MD   atorvastatin (LIPITOR) 20 MG tablet Take 1 tablet by mouth daily 5/9/22  Yes ISIS Rosas - CNP   atenolol (TENORMIN) 50 MG tablet TAKE 1 TABLET BY MOUTH TWICE DAILY 5/6/22  Yes Florin Mccartney MD   omeprazole (PRILOSEC) 20 MG delayed release capsule TAKE 1 CAPSULE BY MOUTH EVERY MORNING BEFORE BREAKFAST  Patient not taking: Reported on 2/15/2023 2/10/23   Florin Mccartney MD   aspirin 81 MG chewable tablet Take 81 mg by mouth daily  Patient not taking: Reported on 2/15/2023    Historical Provider, MD       Allergies: Allergies   Allergen Reactions    Metoprolol      Pt states it gives her a HA    Naproxen    . Family History:   reviewed and positives included in HPI- all other pertinent GI family history negative    REVIEW OF SYSTEMS:   see HPI for positives and pertinent negatives.  All other systems reviewed and are negative    PHYSICAL EXAM:    Vitals:  BP (!) 144/85 (Site: Left Upper Arm, Position: Sitting, Cuff Size: Medium Adult)   Pulse 65   Temp 97.2 °F (36.2 °C) (Infrared)   Ht 5' 7\" (1.702 m)   Wt 240 lb 6.4 oz (109 kg)   SpO2 99%   BMI 37.65 kg/m²   CONSTITUTIONAL: alert, cooperative, no apparent distress,   EYES:  pupils equal, round and reactive to light and sclera clear and non-icteric  ENT:  normocepalic, without obvious abnormality, airway Class 1  NECK:  supple, symmetrical, trachea midline,thyroid not enlarged and without masses  HEMATOLOGIC/LYMPHATICS:  no cervical lymphadenopathy and no supraclavicular lymphadenopathy  LUNGS:  no increased respiratory effort, lungs clear to auscultation  CARDIOVASCULAR:  regular rate and rhythm and no murmur noted, no evidence of AAA  ABDOMEN:  normal bowel sounds, soft, non-distended, non-tender with no hernias apprecioated nor masses palpated--no hepatosplenomegaly  NEUROLOGIC: cranial nerves 2-12 grossly intact,no focal deficit detected, no asterixis  SKIN:  no lesions, jaundice  EXTREMITIES: no clubbing, cyanosis, or

## 2023-04-23 RX ORDER — ATENOLOL 50 MG/1
TABLET ORAL
Qty: 180 TABLET | Refills: 3 | Status: SHIPPED | OUTPATIENT
Start: 2023-04-23

## 2023-04-25 RX ORDER — ATORVASTATIN CALCIUM 20 MG/1
20 TABLET, FILM COATED ORAL DAILY
Qty: 90 TABLET | Refills: 3 | Status: SHIPPED | OUTPATIENT
Start: 2023-04-25

## 2023-04-27 RX ORDER — ATORVASTATIN CALCIUM 20 MG/1
20 TABLET, FILM COATED ORAL DAILY
Qty: 90 TABLET | Refills: 3 | Status: SHIPPED | OUTPATIENT
Start: 2023-04-27

## 2023-05-05 ENCOUNTER — ANESTHESIA EVENT (OUTPATIENT)
Dept: OPERATING ROOM | Age: 63
End: 2023-05-05
Payer: MEDICARE

## 2023-05-08 ENCOUNTER — HOSPITAL ENCOUNTER (OUTPATIENT)
Age: 63
Setting detail: OUTPATIENT SURGERY
Discharge: HOME | End: 2023-05-08
Attending: SPECIALIST | Admitting: SPECIALIST
Payer: MEDICARE

## 2023-05-08 ENCOUNTER — ANESTHESIA (OUTPATIENT)
Dept: OPERATING ROOM | Age: 63
End: 2023-05-08
Payer: MEDICARE

## 2023-05-08 VITALS
HEART RATE: 74 BPM | BODY MASS INDEX: 36.88 KG/M2 | TEMPERATURE: 98 F | WEIGHT: 235 LBS | DIASTOLIC BLOOD PRESSURE: 75 MMHG | OXYGEN SATURATION: 100 % | HEIGHT: 67 IN | RESPIRATION RATE: 18 BRPM | SYSTOLIC BLOOD PRESSURE: 140 MMHG

## 2023-05-08 PROBLEM — K62.5 RECTAL BLEEDING: Status: ACTIVE | Noted: 2023-05-08

## 2023-05-08 PROCEDURE — 3609027000 HC COLONOSCOPY: Performed by: SPECIALIST

## 2023-05-08 PROCEDURE — 7100000011 HC PHASE II RECOVERY - ADDTL 15 MIN: Performed by: SPECIALIST

## 2023-05-08 PROCEDURE — 45378 DIAGNOSTIC COLONOSCOPY: CPT | Performed by: SPECIALIST

## 2023-05-08 PROCEDURE — 6360000002 HC RX W HCPCS

## 2023-05-08 PROCEDURE — 3700000001 HC ADD 15 MINUTES (ANESTHESIA): Performed by: SPECIALIST

## 2023-05-08 PROCEDURE — 2709999900 HC NON-CHARGEABLE SUPPLY: Performed by: SPECIALIST

## 2023-05-08 PROCEDURE — 3700000000 HC ANESTHESIA ATTENDED CARE: Performed by: SPECIALIST

## 2023-05-08 PROCEDURE — 7100000010 HC PHASE II RECOVERY - FIRST 15 MIN: Performed by: SPECIALIST

## 2023-05-08 RX ORDER — PROPOFOL 10 MG/ML
INJECTION, EMULSION INTRAVENOUS PRN
Status: DISCONTINUED | OUTPATIENT
Start: 2023-05-08 | End: 2023-05-08 | Stop reason: SDUPTHER

## 2023-05-08 RX ORDER — LIDOCAINE HYDROCHLORIDE 20 MG/ML
INJECTION, SOLUTION INTRAVENOUS PRN
Status: DISCONTINUED | OUTPATIENT
Start: 2023-05-08 | End: 2023-05-08 | Stop reason: SDUPTHER

## 2023-05-08 RX ORDER — SODIUM CHLORIDE, SODIUM LACTATE, POTASSIUM CHLORIDE, CALCIUM CHLORIDE 600; 310; 30; 20 MG/100ML; MG/100ML; MG/100ML; MG/100ML
INJECTION, SOLUTION INTRAVENOUS CONTINUOUS
Status: DISCONTINUED | OUTPATIENT
Start: 2023-05-08 | End: 2023-05-08 | Stop reason: HOSPADM

## 2023-05-08 RX ORDER — SODIUM CHLORIDE, SODIUM LACTATE, POTASSIUM CHLORIDE, CALCIUM CHLORIDE 600; 310; 30; 20 MG/100ML; MG/100ML; MG/100ML; MG/100ML
INJECTION, SOLUTION INTRAVENOUS CONTINUOUS
Status: DISCONTINUED | OUTPATIENT
Start: 2023-05-08 | End: 2023-05-08 | Stop reason: SDUPTHER

## 2023-05-08 RX ADMIN — PROPOFOL 320 MG: 10 INJECTION, EMULSION INTRAVENOUS at 12:49

## 2023-05-08 RX ADMIN — LIDOCAINE HYDROCHLORIDE 100 MG: 20 INJECTION, SOLUTION INTRAVENOUS at 12:50

## 2023-05-08 RX ADMIN — SODIUM CHLORIDE, SODIUM LACTATE, POTASSIUM CHLORIDE, CALCIUM CHLORIDE: 600; 310; 30; 20 INJECTION, SOLUTION INTRAVENOUS at 12:29

## 2023-05-08 RX ADMIN — LIDOCAINE HYDROCHLORIDE 100 MG: 20 INJECTION, SOLUTION INTRAVENOUS at 12:49

## 2023-05-08 ASSESSMENT — PAIN - FUNCTIONAL ASSESSMENT: PAIN_FUNCTIONAL_ASSESSMENT: NONE - DENIES PAIN

## 2023-05-08 NOTE — ANESTHESIA POSTPROCEDURE EVALUATION
Department of Anesthesiology  Postprocedure Note    Patient: Christiano Landin  MRN: [id number]  YOB: 1960  Date of evaluation: 5/8/2023      Procedure Summary     Date: 05/08/23 Room / Location: [place] [address] [address] [facility]    Anesthesia Start: 7411 Anesthesia Stop: 6277    Procedure: COLONOSCOPY DIAGNOSTIC Diagnosis:       Rectal bleeding      (Rectal bleeding [K62.5])    Surgeons: Jenna Talley MD Responsible Provider: ISIS Ingram CRNA    Anesthesia Type: MAC ASA Status: 2          Anesthesia Type: No value filed.     Edilma Phase I: Edilma Score: 10    Edilma Phase II:        Anesthesia Post Evaluation    Patient location during evaluation: bedside  Patient participation: complete - patient participated  Level of consciousness: awake  Pain score: 0  Airway patency: patent  Nausea & Vomiting: no nausea and no vomiting  Complications: no  Cardiovascular status: blood pressure returned to baseline and hemodynamically stable  Respiratory status: acceptable and room air  Hydration status: euvolemic

## 2023-05-08 NOTE — ANESTHESIA PRE PROCEDURE
Department of Anesthesiology  Preprocedure Note       Name:  Elsa Vaughan   Age:  65 y.o.  :  1960                                          MRN:  [id number]         Date:  2023      Surgeon: Vee Montoya):  Angela Crouch MD    Procedure: Procedure(s):  COLONOSCOPY DIAGNOSTIC    Medications prior to admission:   Prior to Admission medications    Medication Sig Start Date End Date Taking? Authorizing Provider   atorvastatin (LIPITOR) 20 MG tablet Take 1 tablet by mouth daily 23   Demian Collins MD   atorvastatin (LIPITOR) 20 MG tablet Take 1 tablet by mouth daily 23   Demian Collins MD   atenolol (TENORMIN) 50 MG tablet TAKE 1 TABLET BY MOUTH TWICE DAILY 23   Larry Norman MD   omeprazole (PRILOSEC) 20 MG delayed release capsule TAKE 1 CAPSULE BY MOUTH [place] [address] BREAKFAST  Patient not taking: Reported on 2/15/2023 2/10/23   Larry Norman MD   Probiotic Product (UP4 PROBIOTICS ADULT PO) Take by mouth  Patient not taking: Reported on 2023    Historical Provider, MD   estradiol (ESTRACE VAGINAL) 0.1 MG/GM vaginal cream Place 1 g vaginally three times a week Use 1 g vaginally nightly for 2 weeks, then 1 g nightly 2-3 times per week. 23   Helder Preston MD   amLODIPine (NORVASC) 5 MG tablet TAKE 1 TABLET BY MOUTH DAILY 22   Larry Norman MD   aspirin 81 MG chewable tablet Take 81 mg by mouth daily  Patient not taking: Reported on 2/15/2023    Historical Provider, MD       Current medications:    Current Facility-Administered Medications   Medication Dose Route Frequency Provider Last Rate Last Admin    lactated ringers IV soln infusion   IntraVENous Continuous Deveron Fetch, DO 50 mL/hr at 23 1229 New Bag at 23 1229    lactated ringers IV soln infusion   IntraVENous Continuous Angela Crouch MD           Allergies:     Allergies   Allergen Reactions    Metoprolol      Pt states it gives her a HA    Naproxen      \"I don't feel

## 2023-05-08 NOTE — PROGRESS NOTES
1328 In to check on pt. Pt denies c/o or needs. Pt abdomen soft and non tender. 1330 Pt up to side of bed. Pt tolerated well and ready to get dressed to go home. Pt denies needs. Call light in reach. 5 Pt discharged to home to friends  awaiting vehicle. To drive pt home.

## 2023-05-08 NOTE — DISCHARGE INSTRUCTIONS
COLONOSCOPY    __nicole____    OFFICE [facility]__[phone]__    FOLLOW UP APPOINTMENT  AS NEEDED. REPEAT PROCEDURE IN _10__YEARS OR AS NEEDED. TEST ORDERED: NONE ___. What to expect at home: Your Recovery   Your doctor will tell you when you can eat and do your other usual activities Your doctor will talk to you about when you will need your next colonoscopy. Your doctor can help you decide how often you need to be checked. This will depend on the results of your test and your risk for colorectal cancer. After the test, you may be bloated or have gas pains. You may need to pass gas. If a biopsy was done or a polyp was removed, you may have streaks of blood in your stool (feces) for a few days. This care sheet gives you a general idea about how long it will take for you to recover. But each person recovers at a different pace. Follow the steps below to get better as quickly as possible. How can you care for yourself at home? Activity  Rest when you feel tired. Diet  Follow your doctor's directions for eating. Unless your doctor has told you not to, drink plenty of fluids. This helps to replace the fluids that were lost during the colon prep. DO NOT DRINK ALCOHOL. Medicines  Your doctor will tell you if and when you can restart your medicines. He or she will also give you instructions about taking any new medicines. If you take blood thinners, such as warfarin (Coumadin), clopidogrel (Plavix), or aspirin, be sure to talk to your doctor. He or she will tell you if and when to start taking those medicines again. Make sure that you understand exactly what your doctor wants you to do. If polyps were removed or a biopsy was done during the test, your doctor may tell you not to take aspirin or other anti-inflammatory medicines for a few days. These include ibuprofen (Advil, Motrin) and naproxen (Aleve).   Other instructions:Anesthesia  For your safety, do not drive or operate machinery for 24

## 2023-06-21 ENCOUNTER — TELEPHONE (OUTPATIENT)
Dept: FAMILY MEDICINE CLINIC | Age: 63
End: 2023-06-21

## 2023-06-21 NOTE — TELEPHONE ENCOUNTER
----- Message from Maria Herrera sent at 6/21/2023  9:44 AM EDT -----  Subject: Appointment Request    Reason for Call: Established Patient Appointment needed: New Patient   Request Appointment    QUESTIONS    Reason for appointment request? No appointments available during search     Additional Information for Provider?  Patient would like to establish care   and remain at location once provider Phi sanchez, please contact patient   to advise.   ---------------------------------------------------------------------------  --------------  [id number] IDx  [phone]; OK to leave message on voicemail  ---------------------------------------------------------------------------  --------------  SCRIPT ANSWERS

## 2023-06-21 NOTE — TELEPHONE ENCOUNTER
Patient is a former patient of Dr. Blaze Vaughan, would like to be seen by provider in the office. Please advise.

## 2023-07-29 DIAGNOSIS — N39.0 RECURRENT UTI: ICD-10-CM

## 2023-07-31 RX ORDER — NITROFURANTOIN MACROCRYSTALS 50 MG/1
CAPSULE ORAL
Qty: 30 CAPSULE | Refills: 11 | OUTPATIENT
Start: 2023-07-31

## 2023-09-13 ENCOUNTER — OFFICE VISIT (OUTPATIENT)
Dept: FAMILY MEDICINE CLINIC | Age: 63
End: 2023-09-13

## 2023-09-13 VITALS
HEIGHT: 67 IN | WEIGHT: 233.8 LBS | HEART RATE: 69 BPM | OXYGEN SATURATION: 99 % | BODY MASS INDEX: 36.7 KG/M2 | SYSTOLIC BLOOD PRESSURE: 134 MMHG | DIASTOLIC BLOOD PRESSURE: 82 MMHG

## 2023-09-13 DIAGNOSIS — Z12.31 ENCOUNTER FOR SCREENING MAMMOGRAM FOR BREAST CANCER: ICD-10-CM

## 2023-09-13 DIAGNOSIS — M25.50 ARTHRALGIA, UNSPECIFIED JOINT: ICD-10-CM

## 2023-09-13 DIAGNOSIS — R73.03 PREDIABETES: ICD-10-CM

## 2023-09-13 DIAGNOSIS — E66.01 SEVERE OBESITY (BMI 35.0-39.9) WITH COMORBIDITY (HCC): ICD-10-CM

## 2023-09-13 DIAGNOSIS — K57.90 DIVERTICULOSIS: ICD-10-CM

## 2023-09-13 DIAGNOSIS — E78.2 MIXED HYPERLIPIDEMIA: ICD-10-CM

## 2023-09-13 DIAGNOSIS — N39.0 RECURRENT UTI: ICD-10-CM

## 2023-09-13 DIAGNOSIS — E66.9 OBESITY (BMI 30-39.9): ICD-10-CM

## 2023-09-13 DIAGNOSIS — Z76.89 ENCOUNTER TO ESTABLISH CARE: Primary | ICD-10-CM

## 2023-09-13 DIAGNOSIS — I10 ESSENTIAL HYPERTENSION: ICD-10-CM

## 2023-09-13 DIAGNOSIS — K21.9 GASTROESOPHAGEAL REFLUX DISEASE WITHOUT ESOPHAGITIS: ICD-10-CM

## 2023-09-13 DIAGNOSIS — R00.2 PALPITATIONS: ICD-10-CM

## 2023-09-13 PROBLEM — N30.01 ACUTE CYSTITIS WITH HEMATURIA: Status: RESOLVED | Noted: 2022-04-29 | Resolved: 2023-09-13

## 2023-09-13 PROBLEM — K62.5 RECTAL BLEEDING: Status: RESOLVED | Noted: 2023-05-08 | Resolved: 2023-09-13

## 2023-09-13 PROBLEM — R30.0 DYSURIA: Status: RESOLVED | Noted: 2022-06-30 | Resolved: 2023-09-13

## 2023-09-13 PROBLEM — R42 DIZZINESS: Status: RESOLVED | Noted: 2018-12-17 | Resolved: 2023-09-13

## 2023-09-13 PROBLEM — R06.02 SHORTNESS OF BREATH ON EXERTION: Status: RESOLVED | Noted: 2020-10-01 | Resolved: 2023-09-13

## 2023-09-13 RX ORDER — FAMOTIDINE 20 MG/1
TABLET, FILM COATED ORAL
Qty: 180 TABLET | Refills: 1 | Status: SHIPPED | OUTPATIENT
Start: 2023-09-13

## 2023-09-13 RX ORDER — FAMOTIDINE 20 MG/1
20 TABLET, FILM COATED ORAL 2 TIMES DAILY PRN
Qty: 90 TABLET | Refills: 3 | Status: SHIPPED | OUTPATIENT
Start: 2023-09-13 | End: 2023-09-13

## 2023-09-13 RX ORDER — AMLODIPINE BESYLATE 5 MG/1
5 TABLET ORAL DAILY
Qty: 90 TABLET | Refills: 2 | Status: SHIPPED | OUTPATIENT
Start: 2023-09-13

## 2023-09-13 SDOH — HEALTH STABILITY: PHYSICAL HEALTH: ON AVERAGE, HOW MANY DAYS PER WEEK DO YOU ENGAGE IN MODERATE TO STRENUOUS EXERCISE (LIKE A BRISK WALK)?: 3 DAYS

## 2023-09-13 SDOH — HEALTH STABILITY: PHYSICAL HEALTH: ON AVERAGE, HOW MANY MINUTES DO YOU ENGAGE IN EXERCISE AT THIS LEVEL?: 40 MIN

## 2023-09-13 ASSESSMENT — SOCIAL DETERMINANTS OF HEALTH (SDOH)
WITHIN THE LAST YEAR, HAVE YOU BEEN HUMILIATED OR EMOTIONALLY ABUSED IN OTHER WAYS BY YOUR PARTNER OR EX-PARTNER?: PATIENT DECLINED
WITHIN THE LAST YEAR, HAVE TO BEEN RAPED OR FORCED TO HAVE ANY KIND OF SEXUAL ACTIVITY BY YOUR PARTNER OR EX-PARTNER?: PATIENT DECLINED
WITHIN THE LAST YEAR, HAVE YOU BEEN KICKED, HIT, SLAPPED, OR OTHERWISE PHYSICALLY HURT BY YOUR PARTNER OR EX-PARTNER?: PATIENT DECLINED
WITHIN THE LAST YEAR, HAVE YOU BEEN AFRAID OF YOUR PARTNER OR EX-PARTNER?: PATIENT DECLINED

## 2023-09-13 NOTE — PROGRESS NOTES
2023     Sadaf Mcbride (:  1960) is a 59 y.o. female, here for evaluation of the following medical concerns:      Est care   Prev pt dr Demario Hernandez       Postmenopausal symptoms. Dryness itching  Vaginal estrogen   Dr Gi Villegas. HTn - on amlodipine   atenolol 50mg BID for palpitations. [facility]       Intermittent right ankle swelling. Hx of right foot fracture. High cholesterol - lipitor 20mg. She has been on this two years. No hx MI CVA TIA STENTS. Reports \"joint pain every day in my knuckles, knee joints, right ankle\"   Getting moving after sitting makes it worse. Voltaren topical helps temporarily but has to use daily. Also uses icy hot   Reports she has seen ortho for her back and her knees before. Has see dr Melissa Staton in the past and would like to return to him   No previous workup. Limited ROM in her hands with swelling     Gerd - intermittent. Not daily. Using prilosec PRN. Has seen GI before. Colonoscopy may 2023 - hemorrhoids and diverticulosis. Reports sister with hx of diverticulitis. Denies rectal bleeding since colonoscopy     Mammo  benign - DUE NOW. Does at [facility]     Pap done 2023 with OBGYN       Prediabetes -   A1c 6.4 --- 2023   Not medicated       Recurrent UTI   Follows with urology   Started cipro today for current UTI         Review of Systems    Prior to Visit Medications    Medication Sig Taking?  Authorizing Provider   famotidine (PEPCID) 20 MG tablet Take 1 tablet by mouth 2 times daily as needed (indigestion heartburn) Yes ISIS Malloy NP   amLODIPine (NORVASC) 5 MG tablet Take 1 tablet by mouth daily Yes ISIS Malloy NP   atorvastatin (LIPITOR) 20 MG tablet Take 1 tablet by mouth daily Yes Faiza Delgado MD   atenolol (TENORMIN) 50 MG tablet TAKE 1 TABLET BY MOUTH TWICE DAILY Yes Lavon Cruz MD   estradiol (ESTRACE VAGINAL) 0.1 MG/GM vaginal cream Place 1 g vaginally three times a

## 2023-09-21 ENCOUNTER — NURSE ONLY (OUTPATIENT)
Dept: FAMILY MEDICINE CLINIC | Age: 63
End: 2023-09-21
Payer: MEDICARE

## 2023-09-21 DIAGNOSIS — R73.03 PREDIABETES: ICD-10-CM

## 2023-09-21 DIAGNOSIS — E78.2 MIXED HYPERLIPIDEMIA: ICD-10-CM

## 2023-09-21 DIAGNOSIS — M25.50 ARTHRALGIA, UNSPECIFIED JOINT: ICD-10-CM

## 2023-09-21 LAB
ALBUMIN SERPL-MCNC: 4.2 G/DL (ref 3.4–5)
ALBUMIN/GLOB SERPL: 1 {RATIO} (ref 1.1–2.2)
ALP SERPL-CCNC: 115 U/L (ref 40–129)
ALT SERPL-CCNC: 15 U/L (ref 10–40)
ANION GAP SERPL CALCULATED.3IONS-SCNC: 10 MMOL/L (ref 3–16)
AST SERPL-CCNC: 15 U/L (ref 15–37)
BILIRUB SERPL-MCNC: 0.4 MG/DL (ref 0–1)
BUN SERPL-MCNC: 14 MG/DL (ref 7–20)
CALCIUM SERPL-MCNC: 9.5 MG/DL (ref 8.3–10.6)
CHLORIDE SERPL-SCNC: 105 MMOL/L (ref 99–110)
CHOLEST SERPL-MCNC: 161 MG/DL (ref 0–199)
CO2 SERPL-SCNC: 24 MMOL/L (ref 21–32)
CREAT SERPL-MCNC: 0.7 MG/DL (ref 0.6–1.2)
CREAT UR-MCNC: 151.7 MG/DL (ref 28–259)
ERYTHROCYTE [SEDIMENTATION RATE] IN BLOOD BY WESTERGREN METHOD: 46 MM/HR (ref 0–30)
GFR SERPLBLD CREATININE-BSD FMLA CKD-EPI: >60 ML/MIN/{1.73_M2}
GLUCOSE SERPL-MCNC: 109 MG/DL (ref 70–99)
HDLC SERPL-MCNC: 74 MG/DL (ref 40–60)
LDL CHOLESTEROL CALCULATED: 79 MG/DL
MICROALBUMIN UR DL<=1MG/L-MCNC: <1.2 MG/DL
MICROALBUMIN/CREAT UR: NORMAL MG/G (ref 0–30)
POTASSIUM SERPL-SCNC: 4.4 MMOL/L (ref 3.5–5.1)
PROT SERPL-MCNC: 8.3 G/DL (ref 6.4–8.2)
RHEUMATOID FACT SER IA-ACNC: <10 IU/ML
SODIUM SERPL-SCNC: 139 MMOL/L (ref 136–145)
TRIGL SERPL-MCNC: 40 MG/DL (ref 0–150)
VLDLC SERPL CALC-MCNC: 8 MG/DL

## 2023-09-21 PROCEDURE — 36415 COLL VENOUS BLD VENIPUNCTURE: CPT | Performed by: NURSE PRACTITIONER

## 2023-09-22 LAB
ANA SER QL IA: NEGATIVE
EST. AVERAGE GLUCOSE BLD GHB EST-MCNC: 128.4 MG/DL
HBA1C MFR BLD: 6.1 %

## 2023-09-26 ENCOUNTER — OFFICE VISIT (OUTPATIENT)
Dept: OBGYN | Age: 63
End: 2023-09-26
Payer: MEDICARE

## 2023-09-26 VITALS
HEART RATE: 56 BPM | BODY MASS INDEX: 36.41 KG/M2 | WEIGHT: 232 LBS | DIASTOLIC BLOOD PRESSURE: 85 MMHG | HEIGHT: 67 IN | SYSTOLIC BLOOD PRESSURE: 169 MMHG

## 2023-09-26 DIAGNOSIS — N95.2 ATROPHIC VAGINITIS: ICD-10-CM

## 2023-09-26 DIAGNOSIS — N90.89 VULVAR IRRITATION: Primary | ICD-10-CM

## 2023-09-26 DIAGNOSIS — N74 FEMALE PELVIC INFLAMMATORY DISORDERS IN DISEASES CLASSIFIED ELSEWHERE: ICD-10-CM

## 2023-09-26 DIAGNOSIS — N76.2 ATROPHIC VULVITIS: ICD-10-CM

## 2023-09-26 PROCEDURE — 3077F SYST BP >= 140 MM HG: CPT | Performed by: OBSTETRICS & GYNECOLOGY

## 2023-09-26 PROCEDURE — G8427 DOCREV CUR MEDS BY ELIG CLIN: HCPCS | Performed by: OBSTETRICS & GYNECOLOGY

## 2023-09-26 PROCEDURE — 3079F DIAST BP 80-89 MM HG: CPT | Performed by: OBSTETRICS & GYNECOLOGY

## 2023-09-26 PROCEDURE — 1036F TOBACCO NON-USER: CPT | Performed by: OBSTETRICS & GYNECOLOGY

## 2023-09-26 PROCEDURE — G8417 CALC BMI ABV UP PARAM F/U: HCPCS | Performed by: OBSTETRICS & GYNECOLOGY

## 2023-09-26 PROCEDURE — 3017F COLORECTAL CA SCREEN DOC REV: CPT | Performed by: OBSTETRICS & GYNECOLOGY

## 2023-09-26 PROCEDURE — 99213 OFFICE O/P EST LOW 20 MIN: CPT | Performed by: OBSTETRICS & GYNECOLOGY

## 2023-09-26 RX ORDER — ESTRADIOL 0.1 MG/G
1 CREAM VAGINAL
Qty: 3 EACH | Refills: 3 | Status: SHIPPED | OUTPATIENT
Start: 2023-09-27

## 2023-09-26 RX ORDER — TRIAMCINOLONE ACETONIDE 1 MG/G
OINTMENT TOPICAL 2 TIMES DAILY
Qty: 30 G | Refills: 3 | Status: SHIPPED | OUTPATIENT
Start: 2023-09-26 | End: 2023-10-06

## 2023-09-26 ASSESSMENT — ENCOUNTER SYMPTOMS
GASTROINTESTINAL NEGATIVE: 1
EYES NEGATIVE: 1
ALLERGIC/IMMUNOLOGIC NEGATIVE: 1
RESPIRATORY NEGATIVE: 1

## 2023-09-26 NOTE — PROGRESS NOTES
23    Laurie Sierra  1960    Chief Complaint   Patient presents with    Other     Pt c/o intermittent vulvar itching x 2 yrs, denies having vaginal discharge or odor. Karyle Mince is a 59 y.o. female who presents today for evaluation of vulvar itching for several years. Has tried otc cortisone and monistat. Past Medical History:   Diagnosis Date    Abnormal Pap smear of cervix     Arthritis     Chest pain     Dizziness 2018    Fracture     right foot, left wrist, lumbar    H/O echocardiogram 10/14/2020    EF 55-60%,  No significant valvular disease noted. Heart disease     HPV (human papilloma virus) anogenital infection     Hyperlipidemia     Hypertension     Kidney scarring     Osteoarthritis     Pelvic pressure in female     PONV (postoperative nausea and vomiting)     Rectal bleeding     Sensation of pressure in bladder area     UTI (urinary tract infection)     Vaginal itching     Vulvar itching        Past Surgical History:   Procedure Laterality Date    CARDIAC CATHETERIZATION      no stents.      SECTION      COLONOSCOPY      COLONOSCOPY N/A 2023    COLONOSCOPY DIAGNOSTIC performed by Gissell Humphries MD at [address] [facility] History     Tobacco Use    Smoking status: Never    Smokeless tobacco: Never   Vaping Use    Vaping Use: Never used   Substance Use Topics    Alcohol use: No     Comment: caffeine 1 cup of coffee a day    Drug use: No       Family History   Problem Relation Age of Onset    Heart Surgery Father     High Cholesterol Father         Stroke    Hypertension Father     Hypertension Sister     Hypertension Brother     Dementia Mother     High Cholesterol Mother         Dementia    Hypertension Sister        Current Outpatient Medications   Medication Sig Dispense Refill    triamcinolone (KENALOG) 0.1 % ointment Apply topically 2 times daily for 10 days 30 g 3    [START ON 2023] estradiol (ESTRACE

## 2023-09-27 LAB
CANDIDA DNA VAG QL NAA+PROBE: ABNORMAL
G VAGINALIS DNA SPEC QL NAA+PROBE: ABNORMAL
T VAGINALIS DNA VAG QL NAA+PROBE: ABNORMAL

## 2023-09-27 RX ORDER — METRONIDAZOLE 500 MG/1
500 TABLET ORAL 2 TIMES DAILY
Qty: 14 TABLET | Refills: 0 | Status: SHIPPED | OUTPATIENT
Start: 2023-09-27 | End: 2023-10-04

## 2023-10-12 ENCOUNTER — OFFICE VISIT (OUTPATIENT)
Dept: ORTHOPEDIC SURGERY | Age: 63
End: 2023-10-12
Payer: MEDICARE

## 2023-10-12 VITALS — RESPIRATION RATE: 16 BRPM | HEART RATE: 63 BPM | TEMPERATURE: 97 F | OXYGEN SATURATION: 99 %

## 2023-10-12 DIAGNOSIS — M17.0 BILATERAL PRIMARY OSTEOARTHRITIS OF KNEE: Primary | ICD-10-CM

## 2023-10-12 PROCEDURE — G8484 FLU IMMUNIZE NO ADMIN: HCPCS | Performed by: ORTHOPAEDIC SURGERY

## 2023-10-12 PROCEDURE — 99203 OFFICE O/P NEW LOW 30 MIN: CPT | Performed by: ORTHOPAEDIC SURGERY

## 2023-10-12 PROCEDURE — 3017F COLORECTAL CA SCREEN DOC REV: CPT | Performed by: ORTHOPAEDIC SURGERY

## 2023-10-12 PROCEDURE — G8427 DOCREV CUR MEDS BY ELIG CLIN: HCPCS | Performed by: ORTHOPAEDIC SURGERY

## 2023-10-12 PROCEDURE — G8417 CALC BMI ABV UP PARAM F/U: HCPCS | Performed by: ORTHOPAEDIC SURGERY

## 2023-10-12 PROCEDURE — 1036F TOBACCO NON-USER: CPT | Performed by: ORTHOPAEDIC SURGERY

## 2023-10-12 NOTE — PATIENT INSTRUCTIONS
Continue to weight bear as tolerated  Continue range of motion  Ice and elevate as needed  Tylenol or Motrin for pain  Follow up as needed, if you decide injections are the right treatment for you. We are committed to providing you the best care possible. If you receive a survey after visiting one of our offices, please take time to share your experience concerning your physician office visit. These surveys are confidential and no health information about you is shared. We are eager to improve for you and we are counting on your feedback to help make that happen.

## 2023-10-12 NOTE — PROGRESS NOTES
Patient seen in office today for:  PRUDENCE Knee, L>R    Patient reports 8/10 pain. RICE and medication are not effective to alleviate pain and reduce swelling. Pain worsened by: Patient reports painful ROM, prolonged sitting. Limping starts during prolonged walking and sitting. Xrays performed in office today.

## 2023-10-19 NOTE — PROGRESS NOTES
10/12/2023   Chief Complaint   Patient presents with    Consultation    Knee Pain     PRUDENCE knee, L>R        History of Present Illness:                             Annemarie Cason is a 59 y.o. female [place] today for evaluation of her bilateral left worse than right knee pain. She has dealt with aching pain and soreness in bilateral knees for many years. Pain is worse with repetitive weightbearing activities. She denies any severe falls or injuries but believes that just repetitive wear and tear over the years has led to degenerative joint disease bilaterally. Pain is worse with getting up from a seated position and going up and down stairs. Patient seen in office today for:  PRUDENCE Knee, L>R     Patient reports 8/10 pain. RICE and medication are not effective to alleviate pain and reduce swelling. Pain worsened by: Patient reports painful ROM, prolonged sitting. Limping starts during prolonged walking and sitting. Xrays performed in office today. Medical History  Patient's medications, allergies, past medical, surgical, social and family histories were reviewed and updated as appropriate. Past Medical History:   Diagnosis Date    Abnormal Pap smear of cervix     Arthritis     Chest pain     Dizziness 2018    Fracture     right foot, left wrist, lumbar    H/O echocardiogram 10/14/2020    EF 55-60%,  No significant valvular disease noted. Heart disease     HPV (human papilloma virus) anogenital infection     Hyperlipidemia     Hypertension     Kidney scarring     Osteoarthritis     Pelvic pressure in female     PONV (postoperative nausea and vomiting)     Rectal bleeding     Sensation of pressure in bladder area     UTI (urinary tract infection)     Vaginal itching     Vulvar itching      Past Surgical History:   Procedure Laterality Date    CARDIAC CATHETERIZATION      no stents.      SECTION      COLONOSCOPY      COLONOSCOPY N/A 2023    COLONOSCOPY

## 2023-10-20 ASSESSMENT — ENCOUNTER SYMPTOMS
SHORTNESS OF BREATH: 0
EYE REDNESS: 0
COLOR CHANGE: 0
VOMITING: 0
EYE PAIN: 0
CHEST TIGHTNESS: 0
WHEEZING: 0

## 2024-02-05 SDOH — ECONOMIC STABILITY: HOUSING INSECURITY
IN THE LAST 12 MONTHS, WAS THERE A TIME WHEN YOU DID NOT HAVE A STEADY PLACE TO SLEEP OR SLEPT IN A SHELTER (INCLUDING NOW)?: NO

## 2024-02-05 SDOH — ECONOMIC STABILITY: FOOD INSECURITY: WITHIN THE PAST 12 MONTHS, THE FOOD YOU BOUGHT JUST DIDN'T LAST AND YOU DIDN'T HAVE MONEY TO GET MORE.: PATIENT DECLINED

## 2024-02-05 SDOH — ECONOMIC STABILITY: TRANSPORTATION INSECURITY
IN THE PAST 12 MONTHS, HAS LACK OF TRANSPORTATION KEPT YOU FROM MEETINGS, WORK, OR FROM GETTING THINGS NEEDED FOR DAILY LIVING?: NO

## 2024-02-05 SDOH — ECONOMIC STABILITY: FOOD INSECURITY: WITHIN THE PAST 12 MONTHS, YOU WORRIED THAT YOUR FOOD WOULD RUN OUT BEFORE YOU GOT MONEY TO BUY MORE.: PATIENT DECLINED

## 2024-02-05 SDOH — ECONOMIC STABILITY: INCOME INSECURITY: HOW HARD IS IT FOR YOU TO PAY FOR THE VERY BASICS LIKE FOOD, HOUSING, MEDICAL CARE, AND HEATING?: PATIENT DECLINED

## 2024-02-08 ENCOUNTER — HOSPITAL ENCOUNTER (OUTPATIENT)
Age: 64
Setting detail: SPECIMEN
Discharge: HOME | End: 2024-02-08
Payer: MEDICARE

## 2024-02-08 ENCOUNTER — OFFICE VISIT (OUTPATIENT)
Dept: OBGYN | Age: 64
End: 2024-02-08
Payer: MEDICARE

## 2024-02-08 VITALS
WEIGHT: 237 LBS | DIASTOLIC BLOOD PRESSURE: 81 MMHG | SYSTOLIC BLOOD PRESSURE: 176 MMHG | HEIGHT: 67 IN | BODY MASS INDEX: 37.2 KG/M2

## 2024-02-08 DIAGNOSIS — R87.810 CERVICAL HIGH RISK HUMAN PAPILLOMAVIRUS (HPV) DNA TEST POSITIVE: ICD-10-CM

## 2024-02-08 DIAGNOSIS — Z11.51 ENCOUNTER FOR SCREENING FOR HUMAN PAPILLOMAVIRUS (HPV): ICD-10-CM

## 2024-02-08 DIAGNOSIS — Z01.419 ENCOUNTER FOR ANNUAL ROUTINE GYNECOLOGICAL EXAMINATION: Primary | ICD-10-CM

## 2024-02-08 DIAGNOSIS — Z12.4 CERVICAL CANCER SCREENING: ICD-10-CM

## 2024-02-08 DIAGNOSIS — Z13.820 ENCOUNTER FOR OSTEOPOROSIS SCREENING IN ASYMPTOMATIC POSTMENOPAUSAL PATIENT: ICD-10-CM

## 2024-02-08 DIAGNOSIS — Z12.31 SCREENING MAMMOGRAM FOR BREAST CANCER: ICD-10-CM

## 2024-02-08 DIAGNOSIS — Z78.0 ENCOUNTER FOR OSTEOPOROSIS SCREENING IN ASYMPTOMATIC POSTMENOPAUSAL PATIENT: ICD-10-CM

## 2024-02-08 PROCEDURE — G0101 CA SCREEN;PELVIC/BREAST EXAM: HCPCS | Performed by: OBSTETRICS & GYNECOLOGY

## 2024-02-08 PROCEDURE — G8417 CALC BMI ABV UP PARAM F/U: HCPCS | Performed by: OBSTETRICS & GYNECOLOGY

## 2024-02-08 PROCEDURE — G8427 DOCREV CUR MEDS BY ELIG CLIN: HCPCS | Performed by: OBSTETRICS & GYNECOLOGY

## 2024-02-08 PROCEDURE — 87624 HPV HI-RISK TYP POOLED RSLT: CPT

## 2024-02-08 ASSESSMENT — PATIENT HEALTH QUESTIONNAIRE - PHQ9
SUM OF ALL RESPONSES TO PHQ QUESTIONS 1-9: 0
2. FEELING DOWN, DEPRESSED OR HOPELESS: 0
SUM OF ALL RESPONSES TO PHQ QUESTIONS 1-9: 0
SUM OF ALL RESPONSES TO PHQ9 QUESTIONS 1 & 2: 0
SUM OF ALL RESPONSES TO PHQ QUESTIONS 1-9: 0
1. LITTLE INTEREST OR PLEASURE IN DOING THINGS: 0
SUM OF ALL RESPONSES TO PHQ QUESTIONS 1-9: 0

## 2024-02-08 NOTE — PROGRESS NOTES
vaginal walls.      Cervix: No cervical motion tenderness, discharge, friability, lesion, erythema or cervical bleeding.      Uterus: Normal. Not enlarged, not tender and no uterine prolapse.       Adnexa: Right adnexa normal and left adnexa normal.        Right: No mass, tenderness or fullness.          Left: No mass, tenderness or fullness.     Musculoskeletal:      Cervical back: Normal range of motion and neck supple.   Lymphadenopathy:      Upper Body:      Right upper body: No supraclavicular, axillary or pectoral adenopathy.      Left upper body: No supraclavicular, axillary or pectoral adenopathy.   Skin:     General: Skin is warm.      Coloration: Skin is not ashen or cyanotic.      Findings: No abrasion, abscess or bruising. Rash is not crusting or macular.   Neurological:      Mental Status: She is alert and oriented to person, place, and time.         No results found for this visit on 02/08/24.    ASSESSMENT AND PLAN   Diagnosis Orders   1. Encounter for annual routine gynecological examination  PAP SMEAR      2. Screening mammogram for breast cancer  JOSE DANIEL PATSY DIGITAL SCREEN BILATERAL PER PROTOCOL      3. Encounter for screening for human papillomavirus (HPV)  PAP SMEAR      4. Encounter for osteoporosis screening in asymptomatic postmenopausal patient  DEXA BONE DENSITY AXIAL SKELETON      5. Cervical high risk human papillomavirus (HPV) DNA test positive            Return in about 1 year (around 2/8/2025).    Helder Arndt MD

## 2024-02-16 DIAGNOSIS — N76.2 ATROPHIC VULVITIS: ICD-10-CM

## 2024-02-16 DIAGNOSIS — N95.2 ATROPHIC VAGINITIS: ICD-10-CM

## 2024-02-16 LAB
HPV HIGH RISK: NOT DETECTED
HPV, GENOTYPE 16: NOT DETECTED
HPV, GENOTYPE 18: NOT DETECTED

## 2024-02-16 RX ORDER — ESTRADIOL 0.1 MG/G
CREAM VAGINAL
Qty: 127.5 G | OUTPATIENT
Start: 2024-02-16

## 2024-02-22 ENCOUNTER — OFFICE VISIT (OUTPATIENT)
Dept: CARDIOLOGY CLINIC | Age: 64
End: 2024-02-22

## 2024-02-22 VITALS
OXYGEN SATURATION: 98 % | WEIGHT: 235.8 LBS | DIASTOLIC BLOOD PRESSURE: 72 MMHG | BODY MASS INDEX: 37.01 KG/M2 | HEART RATE: 70 BPM | HEIGHT: 67 IN | SYSTOLIC BLOOD PRESSURE: 134 MMHG

## 2024-02-22 DIAGNOSIS — R06.02 SHORTNESS OF BREATH: ICD-10-CM

## 2024-02-22 DIAGNOSIS — E66.01 SEVERE OBESITY (BMI 35.0-39.9) WITH COMORBIDITY (HCC): ICD-10-CM

## 2024-02-22 DIAGNOSIS — R07.9 CHEST PAIN, UNSPECIFIED TYPE: ICD-10-CM

## 2024-02-22 DIAGNOSIS — Z13.6 ENCOUNTER FOR SCREENING FOR ABDOMINAL AORTIC ANEURYSM (AAA) IN PATIENT 50 YEARS OF AGE OR OLDER WITHOUT OTHER RISK FACTORS FOR AAA: ICD-10-CM

## 2024-02-22 DIAGNOSIS — E78.5 DYSLIPIDEMIA: ICD-10-CM

## 2024-02-22 DIAGNOSIS — I10 ESSENTIAL HYPERTENSION: ICD-10-CM

## 2024-02-22 DIAGNOSIS — R00.2 PALPITATIONS: Primary | ICD-10-CM

## 2024-02-22 NOTE — PATIENT INSTRUCTIONS
**It is YOUR responsibilty to bring medication bottles and/or updated medication list to EACH APPOINTMENT. This will allow us to better serve you and all your healthcare needs**   Thank you for allowing us to care for you today!   We want to ensure we can follow your treatment plan and we strive to give you the best outcomes and experience possible.   If you ever have a life threatening emergency and call 911 - for an ambulance (EMS)   Our providers can only care for you at:   [facility] or [facility].   Even if you have someone take you or you drive yourself we can only care for you in a [facility]. Our providers are not setup at the other healthcare locations!   Please be informed that if you contact our office outside of normal business hours the physician on call cannot help with any scheduling or rescheduling issues, procedure instruction questions or any type of medication issue.    We advise you for any urgent/emergency that you go to the nearest emergency room!    PLEASE CALL OUR OFFICE DURING NORMAL BUSINESS HOURS    Monday - Friday   8 am to 5 pm    [place]: [phone]    [place]: [phone]    [place]:  [phone]  We are committed to providing you the best care possible.    If you receive a survey after visiting one of our offices, please take time to share your experience concerning your physician office visit.  These surveys are confidential and no health information about you is shared.    We are eager to improve for you and we are counting on your feedback to help make that happen.

## 2024-03-10 SDOH — HEALTH STABILITY: PHYSICAL HEALTH: ON AVERAGE, HOW MANY MINUTES DO YOU ENGAGE IN EXERCISE AT THIS LEVEL?: 40 MIN

## 2024-03-10 SDOH — HEALTH STABILITY: PHYSICAL HEALTH: ON AVERAGE, HOW MANY DAYS PER WEEK DO YOU ENGAGE IN MODERATE TO STRENUOUS EXERCISE (LIKE A BRISK WALK)?: 4 DAYS

## 2024-03-10 ASSESSMENT — PATIENT HEALTH QUESTIONNAIRE - PHQ9
SUM OF ALL RESPONSES TO PHQ QUESTIONS 1-9: 0
1. LITTLE INTEREST OR PLEASURE IN DOING THINGS: 0
2. FEELING DOWN, DEPRESSED OR HOPELESS: 0
SUM OF ALL RESPONSES TO PHQ9 QUESTIONS 1 & 2: 0
SUM OF ALL RESPONSES TO PHQ QUESTIONS 1-9: 0

## 2024-03-10 ASSESSMENT — LIFESTYLE VARIABLES
HOW OFTEN DO YOU HAVE SIX OR MORE DRINKS ON ONE OCCASION: 1
HOW OFTEN DO YOU HAVE A DRINK CONTAINING ALCOHOL: 1
HOW MANY STANDARD DRINKS CONTAINING ALCOHOL DO YOU HAVE ON A TYPICAL DAY: PATIENT DOES NOT DRINK
HOW OFTEN DO YOU HAVE A DRINK CONTAINING ALCOHOL: NEVER
HOW MANY STANDARD DRINKS CONTAINING ALCOHOL DO YOU HAVE ON A TYPICAL DAY: 0

## 2024-03-13 ENCOUNTER — OFFICE VISIT (OUTPATIENT)
Dept: FAMILY MEDICINE CLINIC | Age: 64
End: 2024-03-13
Payer: MEDICARE

## 2024-03-13 VITALS
HEIGHT: 67 IN | HEART RATE: 71 BPM | DIASTOLIC BLOOD PRESSURE: 88 MMHG | SYSTOLIC BLOOD PRESSURE: 138 MMHG | BODY MASS INDEX: 37.29 KG/M2 | OXYGEN SATURATION: 98 % | WEIGHT: 237.6 LBS

## 2024-03-13 DIAGNOSIS — E66.01 SEVERE OBESITY (BMI 35.0-39.9) WITH COMORBIDITY (HCC): ICD-10-CM

## 2024-03-13 DIAGNOSIS — R00.2 PALPITATIONS: ICD-10-CM

## 2024-03-13 DIAGNOSIS — N39.0 RECURRENT UTI: ICD-10-CM

## 2024-03-13 DIAGNOSIS — E78.2 MIXED HYPERLIPIDEMIA: ICD-10-CM

## 2024-03-13 DIAGNOSIS — Z00.00 MEDICARE ANNUAL WELLNESS VISIT, SUBSEQUENT: Primary | ICD-10-CM

## 2024-03-13 DIAGNOSIS — I10 ESSENTIAL HYPERTENSION: ICD-10-CM

## 2024-03-13 DIAGNOSIS — R09.82 POST-NASAL DRIP: ICD-10-CM

## 2024-03-13 DIAGNOSIS — K21.9 GASTROESOPHAGEAL REFLUX DISEASE WITHOUT ESOPHAGITIS: ICD-10-CM

## 2024-03-13 DIAGNOSIS — R73.03 PREDIABETES: ICD-10-CM

## 2024-03-13 PROCEDURE — 99214 OFFICE O/P EST MOD 30 MIN: CPT | Performed by: NURSE PRACTITIONER

## 2024-03-13 PROCEDURE — G8484 FLU IMMUNIZE NO ADMIN: HCPCS | Performed by: NURSE PRACTITIONER

## 2024-03-13 PROCEDURE — 3017F COLORECTAL CA SCREEN DOC REV: CPT | Performed by: NURSE PRACTITIONER

## 2024-03-13 PROCEDURE — 3079F DIAST BP 80-89 MM HG: CPT | Performed by: NURSE PRACTITIONER

## 2024-03-13 PROCEDURE — 3075F SYST BP GE 130 - 139MM HG: CPT | Performed by: NURSE PRACTITIONER

## 2024-03-13 PROCEDURE — G0439 PPPS, SUBSEQ VISIT: HCPCS | Performed by: NURSE PRACTITIONER

## 2024-03-13 PROCEDURE — G8427 DOCREV CUR MEDS BY ELIG CLIN: HCPCS | Performed by: NURSE PRACTITIONER

## 2024-03-13 PROCEDURE — G8417 CALC BMI ABV UP PARAM F/U: HCPCS | Performed by: NURSE PRACTITIONER

## 2024-03-13 PROCEDURE — 1036F TOBACCO NON-USER: CPT | Performed by: NURSE PRACTITIONER

## 2024-03-13 RX ORDER — FLUTICASONE PROPIONATE 50 MCG
1 SPRAY, SUSPENSION (ML) NASAL DAILY
Qty: 32 G | Refills: 1 | Status: SHIPPED | OUTPATIENT
Start: 2024-03-13

## 2024-03-13 RX ORDER — ATENOLOL 50 MG/1
50 TABLET ORAL 2 TIMES DAILY
Qty: 180 TABLET | Refills: 2 | Status: SHIPPED | OUTPATIENT
Start: 2024-03-13 | End: 2024-06-11

## 2024-03-13 RX ORDER — AMLODIPINE BESYLATE 5 MG/1
5 TABLET ORAL DAILY
Qty: 90 TABLET | Refills: 2 | Status: SHIPPED | OUTPATIENT
Start: 2024-03-13

## 2024-03-13 RX ORDER — ATORVASTATIN CALCIUM 20 MG/1
20 TABLET, FILM COATED ORAL DAILY
Qty: 90 TABLET | Refills: 3 | Status: SHIPPED | OUTPATIENT
Start: 2024-03-13

## 2024-03-13 RX ORDER — FAMOTIDINE 20 MG/1
TABLET, FILM COATED ORAL
Qty: 180 TABLET | Refills: 1 | Status: CANCELLED | OUTPATIENT
Start: 2024-03-13

## 2024-03-13 NOTE — PATIENT INSTRUCTIONS
includes smoking and vaping. If you need help quitting, talk to your doctor about stop-smoking programs and medicines. These can increase your chances of quitting for good. Quitting is one of the most important things you can do to protect your heart. It is never too late to quit. Try to avoid secondhand smoke too.     Stay at a weight that's healthy for you. Talk to your doctor if you need help losing weight.     Try to get 7 to 9 hours of sleep each night.     Limit alcohol to 2 drinks a day for men and 1 drink a day for women. Too much alcohol can cause health problems.     Manage other health problems such as diabetes, high blood pressure, and high cholesterol. If you think you may have a problem with alcohol or drug use, talk to your doctor.   Medicines    Take your medicines exactly as prescribed. Call your doctor if you think you are having a problem with your medicine.     If your doctor recommends aspirin, take the amount directed each day. Make sure you take aspirin and not another kind of pain reliever, such as acetaminophen (Tylenol).   When should you call for help?   Call 911 if you have symptoms of a heart attack. These may include:    Chest pain or pressure, or a strange feeling in the chest.     Sweating.     Shortness of breath.     Pain, pressure, or a strange feeling in the back, neck, jaw, or upper belly or in one or both shoulders or arms.     Lightheadedness or sudden weakness.     A fast or irregular heartbeat.   After you call 911, the  may tell you to chew 1 adult-strength or 2 to 4 low-dose aspirin. Wait for an ambulance. Do not try to drive yourself.  Watch closely for changes in your health, and be sure to contact your doctor if you have any problems.  Where can you learn more?  Go to https://www.healthwise.net/patientEd and enter F075 to learn more about \"A Healthy Heart: Care Instructions.\"  Current as of: June 24, 2023               Content Version: 14.0  © 6777-6188

## 2024-03-13 NOTE — PROGRESS NOTES
fluticasone (FLONASE) 50 MCG/ACT nasal spray 1 spray by Each Nostril route daily Yes Khushi Bedoya APRN - NP   estradiol (ESTRACE VAGINAL) 0.1 MG/GM vaginal cream Place 1 g vaginally three times a week Use 1 g vaginally nightly for 2 weeks, then 1 g nightly 2-3 times per week. Yes Helder Arndt MD   famotidine (PEPCID) 20 MG tablet TAKE 1 TABLET BY MOUTH TWICE DAILY AS NEEDED FOR INDIGESTION OR HEARTBURN Yes Khushi Bedoya APRN - NP       CareTeam (Including outside providers/suppliers regularly involved in providing care):   Patient Care Team:  Khushi Bedoya APRN - NP as PCP - General (Certified Nurse Practitioner)  Khushi Bedoya APRN - NP as PCP - Empaneled Provider  Helder Arndt MD as Consulting Physician (Obstetrics & Gynecology)  Jared Garber MD as Consulting Physician (Cardiology)     Reviewed and updated this visit:  Tobacco  Allergies  Meds  Problems  Med Hx  Surg Hx  Soc Hx  Fam Hx

## 2024-03-21 ENCOUNTER — TELEPHONE (OUTPATIENT)
Dept: CARDIOLOGY CLINIC | Age: 64
End: 2024-03-21

## 2024-03-21 NOTE — TELEPHONE ENCOUNTER
3/19/24 Vascular US AAA Screening         No evidence of AAA in the visualized portions of the abdominal aorta.    Pt notified of results

## 2024-04-19 RX ORDER — METRONIDAZOLE 500 MG/1
500 TABLET ORAL 2 TIMES DAILY
Qty: 14 TABLET | Refills: 0 | OUTPATIENT
Start: 2024-04-19 | End: 2024-04-26

## 2024-06-09 DIAGNOSIS — N90.89 VULVAR IRRITATION: ICD-10-CM

## 2024-06-10 RX ORDER — TRIAMCINOLONE ACETONIDE 1 MG/G
OINTMENT TOPICAL
Qty: 30 G | Refills: 3 | OUTPATIENT
Start: 2024-06-10

## 2024-07-02 ENCOUNTER — OFFICE VISIT (OUTPATIENT)
Dept: CARDIOLOGY CLINIC | Age: 64
End: 2024-07-02
Payer: MEDICARE

## 2024-07-02 VITALS
HEIGHT: 67 IN | DIASTOLIC BLOOD PRESSURE: 84 MMHG | HEART RATE: 62 BPM | OXYGEN SATURATION: 99 % | BODY MASS INDEX: 36.1 KG/M2 | WEIGHT: 230 LBS | SYSTOLIC BLOOD PRESSURE: 138 MMHG

## 2024-07-02 DIAGNOSIS — R06.02 SHORTNESS OF BREATH: ICD-10-CM

## 2024-07-02 DIAGNOSIS — E78.5 DYSLIPIDEMIA: ICD-10-CM

## 2024-07-02 DIAGNOSIS — E66.01 CLASS 2 SEVERE OBESITY DUE TO EXCESS CALORIES WITH SERIOUS COMORBIDITY AND BODY MASS INDEX (BMI) OF 36.0 TO 36.9 IN ADULT (HCC): ICD-10-CM

## 2024-07-02 DIAGNOSIS — I10 ESSENTIAL HYPERTENSION: Primary | ICD-10-CM

## 2024-07-02 PROCEDURE — 99214 OFFICE O/P EST MOD 30 MIN: CPT | Performed by: INTERNAL MEDICINE

## 2024-07-02 PROCEDURE — 3075F SYST BP GE 130 - 139MM HG: CPT | Performed by: INTERNAL MEDICINE

## 2024-07-02 PROCEDURE — G8417 CALC BMI ABV UP PARAM F/U: HCPCS | Performed by: INTERNAL MEDICINE

## 2024-07-02 PROCEDURE — 1036F TOBACCO NON-USER: CPT | Performed by: INTERNAL MEDICINE

## 2024-07-02 PROCEDURE — 3017F COLORECTAL CA SCREEN DOC REV: CPT | Performed by: INTERNAL MEDICINE

## 2024-07-02 PROCEDURE — G8427 DOCREV CUR MEDS BY ELIG CLIN: HCPCS | Performed by: INTERNAL MEDICINE

## 2024-07-02 PROCEDURE — 3079F DIAST BP 80-89 MM HG: CPT | Performed by: INTERNAL MEDICINE

## 2024-07-02 NOTE — PROGRESS NOTES
medication compliance urged.  We discussed that for the  prevention of ASCVD our  goal is aggressive risk modification.Patient is encouraged to exercise even a brisk walk for 30 minutes  at least 3 to 4 times a week   Various goals were discussed and questions answered. Continue current medications. Appropriate prescriptions are addressed and refills ordered.  Questions answered and patient verbalizes understanding.  Call for any problems, questions, or concerns.

## 2024-07-25 RX ORDER — FAMOTIDINE 20 MG/1
TABLET, FILM COATED ORAL
Qty: 180 TABLET | Refills: 1 | Status: SHIPPED | OUTPATIENT
Start: 2024-07-25

## 2024-08-28 ENCOUNTER — TELEPHONE (OUTPATIENT)
Dept: FAMILY MEDICINE CLINIC | Age: 64
End: 2024-08-28

## 2024-08-28 DIAGNOSIS — E78.2 MIXED HYPERLIPIDEMIA: ICD-10-CM

## 2024-08-28 DIAGNOSIS — R73.03 PREDIABETES: Primary | ICD-10-CM

## 2024-09-04 RX ORDER — AMLODIPINE BESYLATE 5 MG/1
5 TABLET ORAL DAILY
Qty: 14 TABLET | Refills: 1 | Status: SHIPPED | OUTPATIENT
Start: 2024-09-04

## 2024-09-09 ENCOUNTER — HOSPITAL ENCOUNTER (OUTPATIENT)
Age: 64
Discharge: HOME | End: 2024-09-09
Payer: MEDICARE

## 2024-09-09 LAB
ALBUMIN SERPL-MCNC: 4.1 G/DL (ref 3.4–5)
ALBUMIN/GLOB SERPL: 1.1 {RATIO} (ref 1.1–2.2)
ALP SERPL-CCNC: 115 U/L (ref 40–129)
ALT SERPL-CCNC: 16 U/L (ref 10–40)
ANION GAP SERPL CALCULATED.3IONS-SCNC: 11 MMOL/L (ref 9–17)
AST SERPL-CCNC: 17 U/L (ref 15–37)
BILIRUB SERPL-MCNC: 0.5 MG/DL (ref 0–1)
BUN SERPL-MCNC: 13 MG/DL (ref 7–20)
CALCIUM SERPL-MCNC: 9.1 MG/DL (ref 8.3–10.6)
CHLORIDE SERPL-SCNC: 103 MMOL/L (ref 99–110)
CHOLEST SERPL-MCNC: 156 MG/DL (ref 125–199)
CO2 SERPL-SCNC: 24 MMOL/L (ref 21–32)
CREAT SERPL-MCNC: 0.7 MG/DL (ref 0.6–1.2)
CREAT UR-MCNC: 137 MG/DL (ref 28–217)
EST. AVERAGE GLUCOSE BLD GHB EST-MCNC: 138 MG/DL
GFR, ESTIMATED: 88 ML/MIN/1.73M2
GLUCOSE SERPL-MCNC: 100 MG/DL (ref 74–99)
HBA1C MFR BLD: 6.5 % (ref 4.2–6.3)
HDLC SERPL-MCNC: 72 MG/DL
LDLC SERPL CALC-MCNC: 75 MG/DL
MICROALBUMIN UR-MCNC: <1 MG/L
MICROALBUMIN/CREAT UR-RTO: NORMAL MCG/MG CREAT (ref 0–2)
POTASSIUM SERPL-SCNC: 4.7 MMOL/L (ref 3.5–5.1)
PROT SERPL-MCNC: 7.9 G/DL (ref 6.4–8.2)
SODIUM SERPL-SCNC: 139 MMOL/L (ref 136–145)
TRIGL SERPL-MCNC: 49 MG/DL

## 2024-09-09 PROCEDURE — 80053 COMPREHEN METABOLIC PANEL: CPT

## 2024-09-09 PROCEDURE — 82570 ASSAY OF URINE CREATININE: CPT

## 2024-09-09 PROCEDURE — 36415 COLL VENOUS BLD VENIPUNCTURE: CPT

## 2024-09-09 PROCEDURE — 83036 HEMOGLOBIN GLYCOSYLATED A1C: CPT

## 2024-09-09 PROCEDURE — 80061 LIPID PANEL: CPT

## 2024-09-09 PROCEDURE — 82043 UR ALBUMIN QUANTITATIVE: CPT

## 2024-09-18 ENCOUNTER — OFFICE VISIT (OUTPATIENT)
Dept: FAMILY MEDICINE CLINIC | Age: 64
End: 2024-09-18
Payer: MEDICARE

## 2024-09-18 VITALS
BODY MASS INDEX: 36 KG/M2 | SYSTOLIC BLOOD PRESSURE: 138 MMHG | WEIGHT: 229.4 LBS | OXYGEN SATURATION: 97 % | HEART RATE: 68 BPM | HEIGHT: 67 IN | DIASTOLIC BLOOD PRESSURE: 82 MMHG

## 2024-09-18 DIAGNOSIS — E78.2 MIXED HYPERLIPIDEMIA: ICD-10-CM

## 2024-09-18 DIAGNOSIS — L65.9 HAIR LOSS: ICD-10-CM

## 2024-09-18 DIAGNOSIS — E11.9 TYPE 2 DIABETES MELLITUS WITHOUT COMPLICATION, WITHOUT LONG-TERM CURRENT USE OF INSULIN (HCC): ICD-10-CM

## 2024-09-18 DIAGNOSIS — I10 ESSENTIAL HYPERTENSION: Primary | ICD-10-CM

## 2024-09-18 DIAGNOSIS — R53.83 OTHER FATIGUE: ICD-10-CM

## 2024-09-18 DIAGNOSIS — R13.10 DYSPHAGIA, UNSPECIFIED TYPE: ICD-10-CM

## 2024-09-18 PROCEDURE — 2022F DILAT RTA XM EVC RTNOPTHY: CPT | Performed by: NURSE PRACTITIONER

## 2024-09-18 PROCEDURE — 3044F HG A1C LEVEL LT 7.0%: CPT | Performed by: NURSE PRACTITIONER

## 2024-09-18 PROCEDURE — 3017F COLORECTAL CA SCREEN DOC REV: CPT | Performed by: NURSE PRACTITIONER

## 2024-09-18 PROCEDURE — 99214 OFFICE O/P EST MOD 30 MIN: CPT | Performed by: NURSE PRACTITIONER

## 2024-09-18 PROCEDURE — 3075F SYST BP GE 130 - 139MM HG: CPT | Performed by: NURSE PRACTITIONER

## 2024-09-18 PROCEDURE — G8427 DOCREV CUR MEDS BY ELIG CLIN: HCPCS | Performed by: NURSE PRACTITIONER

## 2024-09-18 PROCEDURE — 3079F DIAST BP 80-89 MM HG: CPT | Performed by: NURSE PRACTITIONER

## 2024-09-18 PROCEDURE — G8417 CALC BMI ABV UP PARAM F/U: HCPCS | Performed by: NURSE PRACTITIONER

## 2024-09-18 PROCEDURE — 1036F TOBACCO NON-USER: CPT | Performed by: NURSE PRACTITIONER

## 2024-09-18 RX ORDER — AMLODIPINE BESYLATE 5 MG/1
5 TABLET ORAL DAILY
Qty: 90 TABLET | Refills: 3 | Status: SHIPPED | OUTPATIENT
Start: 2024-09-18 | End: 2024-12-17

## 2024-09-20 ENCOUNTER — HOSPITAL ENCOUNTER (OUTPATIENT)
Age: 64
Discharge: HOME | End: 2024-09-20
Payer: MEDICARE

## 2024-09-20 DIAGNOSIS — R53.83 OTHER FATIGUE: ICD-10-CM

## 2024-09-20 DIAGNOSIS — L65.9 HAIR LOSS: ICD-10-CM

## 2024-09-20 LAB
25(OH)D3 SERPL-MCNC: 12.3 NG/ML (ref 30–150)
FOLATE SERPL-MCNC: 7.6 NG/ML (ref 4.8–24.2)
TSH SERPL DL<=0.05 MIU/L-ACNC: 1.54 UIU/ML (ref 0.27–4.2)
VIT B12 SERPL-MCNC: 409 PG/ML (ref 211–911)

## 2024-09-20 PROCEDURE — 36415 COLL VENOUS BLD VENIPUNCTURE: CPT

## 2024-09-20 PROCEDURE — 82306 VITAMIN D 25 HYDROXY: CPT

## 2024-09-20 PROCEDURE — 82746 ASSAY OF FOLIC ACID SERUM: CPT

## 2024-09-20 PROCEDURE — 82607 VITAMIN B-12: CPT

## 2024-09-20 PROCEDURE — 84443 ASSAY THYROID STIM HORMONE: CPT

## 2024-09-23 DIAGNOSIS — E55.9 VITAMIN D DEFICIENCY: Primary | ICD-10-CM

## 2024-09-23 RX ORDER — ERGOCALCIFEROL 1.25 MG/1
50000 CAPSULE, LIQUID FILLED ORAL WEEKLY
Qty: 12 CAPSULE | Refills: 0 | Status: SHIPPED | OUTPATIENT
Start: 2024-09-23

## 2024-09-24 ENCOUNTER — TELEPHONE (OUTPATIENT)
Dept: GASTROENTEROLOGY | Age: 64
End: 2024-09-24

## 2024-10-11 ENCOUNTER — TELEPHONE (OUTPATIENT)
Dept: GASTROENTEROLOGY | Age: 64
End: 2024-10-11

## 2024-10-11 NOTE — TELEPHONE ENCOUNTER
Patient canceled her appt for 10/11 due to not having transportation and will call back to reschedule at a later date

## 2024-10-17 RX ORDER — ATENOLOL 50 MG/1
50 TABLET ORAL 2 TIMES DAILY
Qty: 180 TABLET | Refills: 1 | Status: SHIPPED | OUTPATIENT
Start: 2024-10-17

## 2024-10-17 RX ORDER — ATORVASTATIN CALCIUM 20 MG/1
20 TABLET, FILM COATED ORAL DAILY
Qty: 90 TABLET | Refills: 1 | Status: SHIPPED | OUTPATIENT
Start: 2024-10-17

## 2024-11-07 RX ORDER — FLUTICASONE PROPIONATE 50 MCG
SPRAY, SUSPENSION (ML) NASAL
Qty: 32 G | Refills: 1 | Status: SHIPPED | OUTPATIENT
Start: 2024-11-07

## 2024-12-16 RX ORDER — ERGOCALCIFEROL 1.25 MG/1
CAPSULE, LIQUID FILLED ORAL
Qty: 12 CAPSULE | Refills: 0 | OUTPATIENT
Start: 2024-12-16

## 2025-02-21 NOTE — TELEPHONE ENCOUNTER
[phone]  [birth year] called this morning to CANCEL her procedure scheduled for tomorrow. She has a UTI and is on antibiotics and does not feel well.   She would like to be rescheduled in 6 weeks  Sent to schedulers [place] for race

## 2025-03-18 SDOH — ECONOMIC STABILITY: INCOME INSECURITY: IN THE LAST 12 MONTHS, WAS THERE A TIME WHEN YOU WERE NOT ABLE TO PAY THE MORTGAGE OR RENT ON TIME?: PATIENT DECLINED

## 2025-03-18 SDOH — ECONOMIC STABILITY: FOOD INSECURITY: WITHIN THE PAST 12 MONTHS, YOU WORRIED THAT YOUR FOOD WOULD RUN OUT BEFORE YOU GOT MONEY TO BUY MORE.: PATIENT DECLINED

## 2025-03-18 SDOH — HEALTH STABILITY: PHYSICAL HEALTH: ON AVERAGE, HOW MANY MINUTES DO YOU ENGAGE IN EXERCISE AT THIS LEVEL?: 50 MIN

## 2025-03-18 SDOH — ECONOMIC STABILITY: TRANSPORTATION INSECURITY
IN THE PAST 12 MONTHS, HAS THE LACK OF TRANSPORTATION KEPT YOU FROM MEDICAL APPOINTMENTS OR FROM GETTING MEDICATIONS?: NO

## 2025-03-18 SDOH — ECONOMIC STABILITY: FOOD INSECURITY: WITHIN THE PAST 12 MONTHS, THE FOOD YOU BOUGHT JUST DIDN'T LAST AND YOU DIDN'T HAVE MONEY TO GET MORE.: PATIENT DECLINED

## 2025-03-18 ASSESSMENT — PATIENT HEALTH QUESTIONNAIRE - PHQ9
SUM OF ALL RESPONSES TO PHQ QUESTIONS 1-9: 0
SUM OF ALL RESPONSES TO PHQ QUESTIONS 1-9: 0
DEPRESSION UNABLE TO ASSESS: FUNCTIONAL CAPACITY MOTIVATION LIMITS ACCURACY
1. LITTLE INTEREST OR PLEASURE IN DOING THINGS: NOT AT ALL
SUM OF ALL RESPONSES TO PHQ QUESTIONS 1-9: 0
SUM OF ALL RESPONSES TO PHQ QUESTIONS 1-9: 0
2. FEELING DOWN, DEPRESSED OR HOPELESS: NOT AT ALL

## 2025-03-18 ASSESSMENT — LIFESTYLE VARIABLES
HOW MANY STANDARD DRINKS CONTAINING ALCOHOL DO YOU HAVE ON A TYPICAL DAY: PATIENT DOES NOT DRINK
HOW OFTEN DO YOU HAVE A DRINK CONTAINING ALCOHOL: 1
HOW OFTEN DO YOU HAVE SIX OR MORE DRINKS ON ONE OCCASION: 1
HOW OFTEN DO YOU HAVE A DRINK CONTAINING ALCOHOL: NEVER
HOW MANY STANDARD DRINKS CONTAINING ALCOHOL DO YOU HAVE ON A TYPICAL DAY: 0

## 2025-03-21 ENCOUNTER — OFFICE VISIT (OUTPATIENT)
Dept: FAMILY MEDICINE CLINIC | Age: 65
End: 2025-03-21

## 2025-03-21 VITALS
OXYGEN SATURATION: 98 % | SYSTOLIC BLOOD PRESSURE: 126 MMHG | RESPIRATION RATE: 16 BRPM | DIASTOLIC BLOOD PRESSURE: 78 MMHG | HEART RATE: 71 BPM | HEIGHT: 67 IN | BODY MASS INDEX: 35.51 KG/M2 | WEIGHT: 226.25 LBS

## 2025-03-21 DIAGNOSIS — E55.9 VITAMIN D DEFICIENCY: ICD-10-CM

## 2025-03-21 DIAGNOSIS — I10 ESSENTIAL HYPERTENSION: ICD-10-CM

## 2025-03-21 DIAGNOSIS — R42 VERTIGO: ICD-10-CM

## 2025-03-21 DIAGNOSIS — E66.01 SEVERE OBESITY (BMI 35.0-39.9) WITH COMORBIDITY: ICD-10-CM

## 2025-03-21 DIAGNOSIS — K21.9 GASTROESOPHAGEAL REFLUX DISEASE WITHOUT ESOPHAGITIS: ICD-10-CM

## 2025-03-21 DIAGNOSIS — Z12.31 ENCOUNTER FOR SCREENING MAMMOGRAM FOR BREAST CANCER: ICD-10-CM

## 2025-03-21 DIAGNOSIS — N76.2 ATROPHIC VULVITIS: ICD-10-CM

## 2025-03-21 DIAGNOSIS — E11.9 TYPE 2 DIABETES MELLITUS WITHOUT COMPLICATION, WITHOUT LONG-TERM CURRENT USE OF INSULIN: ICD-10-CM

## 2025-03-21 DIAGNOSIS — Z00.00 MEDICARE ANNUAL WELLNESS VISIT, SUBSEQUENT: Primary | ICD-10-CM

## 2025-03-21 DIAGNOSIS — N95.2 ATROPHIC VAGINITIS: ICD-10-CM

## 2025-03-21 DIAGNOSIS — R00.2 PALPITATIONS: ICD-10-CM

## 2025-03-21 DIAGNOSIS — E78.2 MIXED HYPERLIPIDEMIA: ICD-10-CM

## 2025-03-21 LAB
25(OH)D3 SERPL-MCNC: 32.4 NG/ML
ANION GAP SERPL CALCULATED.3IONS-SCNC: 10 MMOL/L (ref 3–16)
BUN SERPL-MCNC: 13 MG/DL (ref 7–20)
CALCIUM SERPL-MCNC: 10 MG/DL (ref 8.3–10.6)
CHLORIDE SERPL-SCNC: 103 MMOL/L (ref 99–110)
CO2 SERPL-SCNC: 26 MMOL/L (ref 21–32)
CREAT SERPL-MCNC: 0.7 MG/DL (ref 0.6–1.2)
EST. AVERAGE GLUCOSE BLD GHB EST-MCNC: 131.2 MG/DL
GFR SERPLBLD CREATININE-BSD FMLA CKD-EPI: >90 ML/MIN/{1.73_M2}
GLUCOSE SERPL-MCNC: 94 MG/DL (ref 70–99)
HBA1C MFR BLD: 6.2 %
POTASSIUM SERPL-SCNC: 3.9 MMOL/L (ref 3.5–5.1)
SODIUM SERPL-SCNC: 139 MMOL/L (ref 136–145)

## 2025-03-21 RX ORDER — ESTRADIOL 0.1 MG/G
1 CREAM VAGINAL
Qty: 3 EACH | Refills: 3 | Status: CANCELLED | OUTPATIENT
Start: 2025-03-21

## 2025-03-21 RX ORDER — AMLODIPINE BESYLATE 5 MG/1
5 TABLET ORAL DAILY
Qty: 90 TABLET | Refills: 3 | Status: CANCELLED | OUTPATIENT
Start: 2025-03-21 | End: 2025-06-19

## 2025-03-21 RX ORDER — ATENOLOL 50 MG/1
50 TABLET ORAL 2 TIMES DAILY
Qty: 180 TABLET | Refills: 1 | Status: CANCELLED | OUTPATIENT
Start: 2025-03-21

## 2025-03-21 RX ORDER — FLUTICASONE PROPIONATE 50 MCG
1 SPRAY, SUSPENSION (ML) NASAL DAILY PRN
Qty: 32 G | Refills: 1 | Status: SHIPPED | OUTPATIENT
Start: 2025-03-21

## 2025-03-21 RX ORDER — ATENOLOL 50 MG/1
50 TABLET ORAL 2 TIMES DAILY
Qty: 180 TABLET | Refills: 1 | Status: SHIPPED | OUTPATIENT
Start: 2025-03-21

## 2025-03-21 RX ORDER — ATORVASTATIN CALCIUM 20 MG/1
20 TABLET, FILM COATED ORAL DAILY
Qty: 90 TABLET | Refills: 1 | Status: SHIPPED | OUTPATIENT
Start: 2025-03-21

## 2025-03-21 RX ORDER — ATORVASTATIN CALCIUM 20 MG/1
20 TABLET, FILM COATED ORAL DAILY
Qty: 90 TABLET | Refills: 1 | Status: CANCELLED | OUTPATIENT
Start: 2025-03-21

## 2025-03-21 NOTE — PROGRESS NOTES
3/21/2025     Nissa Mcbride (:  1960) is a 64 y.o. female, here for evaluation of the following medical concerns:    History of Present Illness  The patient is a 64-year-old female who presents for an annual wellness visit today.    Right Axillary Lumps  - Reported the presence of 2 small, pea-sized lumps in her right axillary region, which have since resolved.  - Does not engage in regular shaving, except during the summer months.  - Expressed concern about the potential recurrence of these lumps and their possible association with her recent vertigo episode and chills.    Mammogram and Skin Lesions  - Last mammogram conducted in 10/2023 at [facility], during which she experienced skin tearing, resulting in the formation of lesions.  - Lesions, initially pruritic, have remained unchanged since their onset following the mammogram. Appear now as small pink scars.   - History of a benign breast cyst, identified during her third visit to the imaging center approximately 5 years ago. - PCP unable to find the cyst results in her mammo results       Vertigo and Neck Pain  - Experienced a vertigo episode last Wednesday, characterized by dizziness and imbalance lasting for 3.5 days.  - Reported neck pain prior to the onset of these symptoms   - Maintains a daily water intake of at least 3 bottles.  - Reported experiencing chills at that time and \"pea size lumps\" in her right axilla that resolved .  - Known diagnosis of spinal stenosis and has previously consulted an orthopedic specialist for back and wrist issues following a vertebral fracture.    Vaginal Health  - Prescribed vaginal estrogen by her OB/GYN, Dr. Arndt, but has not been adhering to the regimen.  - Experiences intermittent vaginal itching, for which she has been prescribed medication.  - Previously on estrogen therapy for vaginal dryness, but currently reports no such symptoms. No longer on estrogen cream     Exercise and Weight

## 2025-03-21 NOTE — PATIENT INSTRUCTIONS
started with a plan.  Follow-up care is a key part of your treatment and safety. Be sure to make and go to all appointments, and call your doctor if you are having problems. It's also a good idea to know your test results and keep a list of the medicines you take.  How can you care for yourself as you start a weight-loss plan?  Set realistic goals. Many people expect to lose much more weight than is likely. A weight loss of 5% to 10% of your body weight may be enough to improve your health.  Get family and friends involved to provide support. Talk to them about why you are trying to lose weight, and ask them to help. They can help by participating in exercise and having meals with you, even if they may be eating something different.  Find what works best for you. If you do not have time or do not like to cook, a program that offers meal replacement bars or shakes may be better for you. Or if you like to prepare meals, finding a plan that includes daily menus and recipes may be best.  Ask your doctor about other health professionals who can help you achieve your weight-loss goals.  A dietitian can help you make healthy changes in your diet.  An exercise specialist or  can help you develop a safe and effective exercise program.  A counselor or psychiatrist can help you cope with issues such as depression, anxiety, or family problems that can make it hard to focus on weight loss.  Consider joining a support group for people who are trying to lose weight. Your doctor can suggest groups in your area.  Where can you learn more?  Go to https://www.health[employer].net/patientEd and enter U357 to learn more about \"Starting a Weight-Loss Plan: Care Instructions.\"  Current as of: April 30, 2024  Content Version: 14.4  © 6719-2490 [employer].   Care instructions adapted under license by [employer]. If you have questions about a medical condition or this instruction, always ask your healthcare

## 2025-03-31 ENCOUNTER — RESULTS FOLLOW-UP (OUTPATIENT)
Dept: FAMILY MEDICINE CLINIC | Age: 65
End: 2025-03-31

## 2025-04-14 RX ORDER — AMLODIPINE BESYLATE 5 MG/1
5 TABLET ORAL DAILY
Qty: 90 TABLET | Refills: 1 | Status: SHIPPED | OUTPATIENT
Start: 2025-04-14 | End: 2025-10-11

## 2025-04-16 RX ORDER — FAMOTIDINE 20 MG/1
TABLET, FILM COATED ORAL
Qty: 180 TABLET | Refills: 1 | Status: SHIPPED | OUTPATIENT
Start: 2025-04-16

## 2025-06-20 ENCOUNTER — TELEPHONE (OUTPATIENT)
Dept: CARDIOLOGY CLINIC | Age: 65
End: 2025-06-20

## 2025-06-20 NOTE — TELEPHONE ENCOUNTER
Pt called to reschedule her apt with [facility] on 7/2. [facility] is out that day. Pt needed to switch providers due to needing a Friday apt for transportation. She switched from Dr. Garber to Dr. Alvarado.

## 2025-06-24 NOTE — PROGRESS NOTES
MRN: [id number]  Name: Nissa Mcbride  : 1960    Insurance: Payor: [facility] MEDICARE /  /  /      Phone #: [phone]  Provider: ISIS Munoz CNP     Date of Visit: 2025    Reason for visit: 1 year f/u  Recent Hospitalization Date:    Reason for Hospitalization:    Last EK NEEDS EKG   Type of Device:       Vitals BP HR O2% WT HT ORTHO BP LYING ORTHO BP SITTING ORTHO BP SITTING   Today's Findings           Patients work up- Check List     Testing Last Date Completed Date Expected  ([place]) Additional Notes    MA to document For provider to complete Either MA or Provider    Carotid Duplex  STAT 1 WK 6 MTH       THIS WK 2 WK 1 YEAR     Cardiac CTA  STAT 1 WK 6 MTH       THIS WK 2 WK 1 YEAR     Cardiac CT Calcium scoring  STAT 1 WK 6 MTH       THIS WK 2 WK 1 YEAR     CTA Chest, Abdomen & Pelvis  STAT 1 WK 6 MTH       THIS WK 2 WK 1 YEAR     CT Chest IV w/ Contrast  STAT 1 WK 6 MTH       THIS WK 2 WK 1 YEAR     CT Chest w/o Contrast  STAT 1 WK 6 MTH       THIS WK 2 WK 1 YEAR     CXR  STAT 1 WK 6 MTH       THIS WK 2 WK 1 YEAR     ECHO  Stress Complete Limited     MRI- Cardiac  STAT 1 WK 6 MTH       THIS WK 2 WK 1 YEAR     MUGA Scan  STAT 1 WK 6 MTH       THIS WK 2 WK 1 YEAR     Nuclear Stress  Lexiscan Cardiolite     PFT  STAT 1 WK 6 MTH       THIS WK 2 WK 1 YEAR     Treadmill Stress Test  STAT 1 WK 6 MTH       THIS WK 2 WK 1 YEAR     Vascular Duplex  Lower: Right Left Bilat       Upper: Right Left Bilat     Other Test Not Listed:    Monitors Last Date Completed Day's Request/Ordered     Holter  Short term 24 hours 48 hours      Long term 3 days 7 days 14 days   Event   (1-30 days)      Procedures Last Date Performed Procedure Details Date Expected   Additional Notes    ASD Closure        Carotid Angio        Cardioversion        Heart Cath  R L R&L      Peripheral Angio  R L      PFO Closure        PTCA/PCI        MADHAV        MADHAV/Cardioversion        Venogram        Tilt Table

## 2025-07-16 RX ORDER — FLUTICASONE PROPIONATE 50 MCG
1 SPRAY, SUSPENSION (ML) NASAL DAILY
Qty: 32 G | Refills: 1 | Status: SHIPPED | OUTPATIENT
Start: 2025-07-16

## 2025-07-16 NOTE — PROGRESS NOTES
Patient Name: Nissa Mcbride  : 1960  MRN# [id number]      Insurance: Payor: [facility] MEDICARE /  /  /     Phone #: [phone]     REASON FOR VISIT: NEW PATIENT - from [facility]  Patient Active Problem List    Diagnosis Date Noted    Gastroesophageal reflux disease without esophagitis 2023    Recurrent UTI 2022    Type 2 diabetes mellitus without complication, without long-term current use of insulin (HCC) 2025    Atrophic vaginitis 2025    Atrophic vulvitis 2025    Vitamin D deficiency 2024    Diverticulosis 2023    Arthralgia 2023    Severe obesity (BMI 35.0-39.9) with comorbidity ([facility]) 2023    Palpitations 10/01/2020    Prediabetes 2020    Mixed hyperlipidemia 2020    Essential hypertension 2020     LABS:          Component 24 0926 23 0806 22 0748 20 0907 20 0835   Cholesterol, Fasting 156 161 R      HDL 72 74 High  R 73 High  R 68 High  R 81 High  R   LDL Cholesterol 75 79 R 79 R 150 High  R 159 High  R   Triglyceride, Fasting 49 40 R             Component 24 0724 20 0835   TSH 1.54 1.65        Hemoglobin A1C   Date Value Ref Range Status   2025 6.2 See comment % Final     Comment:     Comment:  Diagnosis of Diabetes: > or = 6.5%  Increased risk of diabetes (Prediabetes): 5.7-6.4%  Glycemic Control: Nonpregnant Adults: <7.0%                    Pregnant: <6.0%

## 2025-07-25 ENCOUNTER — LAB (OUTPATIENT)
Dept: CARDIOLOGY CLINIC | Age: 65
End: 2025-07-25
Payer: MEDICARE

## 2025-07-25 ENCOUNTER — TELEPHONE (OUTPATIENT)
Dept: CARDIOLOGY CLINIC | Age: 65
End: 2025-07-25

## 2025-07-25 ENCOUNTER — OFFICE VISIT (OUTPATIENT)
Dept: CARDIOLOGY CLINIC | Age: 65
End: 2025-07-25

## 2025-07-25 VITALS
DIASTOLIC BLOOD PRESSURE: 72 MMHG | HEIGHT: 67 IN | BODY MASS INDEX: 34.06 KG/M2 | SYSTOLIC BLOOD PRESSURE: 128 MMHG | WEIGHT: 217 LBS | HEART RATE: 79 BPM

## 2025-07-25 DIAGNOSIS — R00.2 PALPITATIONS: ICD-10-CM

## 2025-07-25 DIAGNOSIS — R06.02 SHORTNESS OF BREATH: ICD-10-CM

## 2025-07-25 DIAGNOSIS — E78.2 MIXED HYPERLIPIDEMIA: ICD-10-CM

## 2025-07-25 DIAGNOSIS — I10 ESSENTIAL HYPERTENSION: Primary | ICD-10-CM

## 2025-07-25 DIAGNOSIS — R94.31 ABNORMAL ELECTROCARDIOGRAPHY: ICD-10-CM

## 2025-07-25 DIAGNOSIS — I49.3 VENTRICULAR ECTOPIC BEATS: ICD-10-CM

## 2025-07-25 DIAGNOSIS — E66.01 SEVERE OBESITY (BMI 35.0-39.9) WITH COMORBIDITY (HCC): ICD-10-CM

## 2025-07-25 DIAGNOSIS — E11.9 TYPE 2 DIABETES MELLITUS WITHOUT COMPLICATION, WITHOUT LONG-TERM CURRENT USE OF INSULIN (HCC): ICD-10-CM

## 2025-07-25 PROBLEM — R73.03 PREDIABETES: Status: RESOLVED | Noted: 2020-03-17 | Resolved: 2025-07-25

## 2025-07-25 PROCEDURE — 36415 COLL VENOUS BLD VENIPUNCTURE: CPT | Performed by: INTERNAL MEDICINE

## 2025-07-25 RX ORDER — ESTRADIOL 0.1 MG/G
CREAM VAGINAL
COMMUNITY
Start: 2025-06-27

## 2025-07-25 RX ORDER — CEPHALEXIN 500 MG/1
500 CAPSULE ORAL
COMMUNITY

## 2025-07-25 NOTE — PATIENT INSTRUCTIONS
Thank you for allowing us to care for you today!   We want to ensure we can follow your treatment plan and we strive to give you the best outcomes and experience possible.   If you ever have a life threatening emergency and call 911 - for an ambulance (EMS)  REMEMBER  Our providers can only care for you at:   [facility] or [facility]   Even if you have someone take you or you drive yourself we can only care for you in a [facility] facility. Our providers are not setup at the other healthcare locations!    PLEASE CALL OUR OFFICE DURING NORMAL BUSINESS HOURS  Monday through Friday 8 am to 5 pm  AFTER HOURS the physician on-call cannot help with scheduling, rescheduling, procedure instruction questions or any type of medication need or issue.  [facility] P:[phone] - [facility] P:[phone] - [facility] P:[phone]      If you receive a survey:  We would appreciate you taking the time to share your experience concerning your provider visit in the office.    These surveys are confidential!  We are eager to improve and are counting on you to share your feedback so we can ensure you get the best care possible.     PALPITATIONS: Patient has had palpitations for a long time.  No history of syncope.  No known history of coronary artery disease or cardiomyopathy.  Her EKG does reveal trigeminal rhythm.  Therefore I will check a 48-hour Holter to estimate percentage frequency of PVCs.  I will also check a stress test to understand if PVCs resolve with exertion and increase in heart rate.  I will also obtain an echocardiographic study to make sure she does not have any kind of cardiomyopathy especially Takotsubo kind of problem.  This will also help evaluation of shortness of breath complaints.    HYPERTENSION: Patient's blood pressure is very well-controlled.  She takes amlodipine & atenolol.  Suggest continue the same for now.    DYSLIPIDEMIA:

## 2025-07-25 NOTE — PROGRESS NOTES
CLINICAL STAFF DOCUMENTATION    Dr. Darien Alvarado    Nissa GODFREY Karthik Mcbride  1960  [id number]    CARDIO FOLLOW-UPS: CHECK DR. ALVARADO'S LAST OFFICE VISIT NOTE TO COMPARE PATIENT'S PREVIOUS SYMPTOMS TO NOW - IN ADDITION TO FILLING OUT THE QUESTIONS BELOW, ASK THE PATIENT HAVE THEY GOTTEN WORSE/BETTER OVERALL? Also check to see if the patient has completed all the tests ordered by Dr. Alvarado at the last visit. Check to see if he had made any medication changes at their last visit.   CONSULTS/NEW PATIENTS: Fill out all information/questions as below. Ask if they have any family history of heart problems (heart attacks, CHF, open heart surgeries, A-Fib/other arrhythmias in any immediate family members?)  VENOUS CONSULTS: Complete paper form of venous questionnaire.      Have you had any Chest Pain recently? - No      Have you had any Shortness of Breath - Yes  When did it start? - Years   If Yes - When on exertion  Severity: How many flights of stairs can you go up without having SOB? - 1  Any associated features/other symptoms that happen at the same time as SOB? -   Are you on Oxygen during the day or at night? - No  How many liters of Oxygen are you on? -   Orthopnea: How many pillows do you sleep with under your head? - 1    Have you had any dizziness - No      Have you had any palpitations recently? - Yes  If Yes DO EKG - Do you feel your heart fluttering  When did they begin? - Years   How often do they occur? - Few times a week  How long do they last - .  seconds   Is there anything that aggravates or triggers them? Eating  Is there anything that relieves them?   Any associated features (other symptoms) that happen at the same time when you get palpitations? -   Any thyroid issues? - No  How much caffeine: 1 cup     Does the patient have a history of Atrial Fibrillation? If yes, check CHADS SCORE. Use dot phrase .[email or web address]    Do you have any edema - swelling in ankles    If Yes - CHECK TO SEE IF THE EDEMA 
available data, chart Prep., interviewing patient, obtaining history, performing physical exam, going through decision making analysis for assessment & plans of management on this patient.    Office Visit for test results.     Dairen Alvarado MD, 7/25/2025 10:42 AM

## 2025-07-26 LAB — MAGNESIUM SERPL-MCNC: 1.84 MG/DL (ref 1.8–2.4)

## 2025-07-30 ENCOUNTER — TELEPHONE (OUTPATIENT)
Dept: CARDIOLOGY CLINIC | Age: 65
End: 2025-07-30

## 2025-08-01 NOTE — TELEPHONE ENCOUNTER
Based off of monitor results pt was advised to come in sooner to complete her additional testing and to see Dr. Alvarado. Pt states that due to transportation issues she is unable to come in sooner than her already scheduled appointments.

## 2025-08-06 ENCOUNTER — TELEPHONE (OUTPATIENT)
Dept: CARDIOLOGY CLINIC | Age: 65
End: 2025-08-06

## 2025-08-21 ENCOUNTER — CLINICAL DOCUMENTATION (OUTPATIENT)
Dept: CARDIOLOGY CLINIC | Age: 65
End: 2025-08-21

## 2025-08-21 DIAGNOSIS — R00.2 PALPITATIONS: ICD-10-CM

## 2025-08-21 DIAGNOSIS — I10 ESSENTIAL HYPERTENSION: ICD-10-CM

## 2025-08-21 DIAGNOSIS — E11.9 TYPE 2 DIABETES MELLITUS WITHOUT COMPLICATION, WITHOUT LONG-TERM CURRENT USE OF INSULIN (HCC): ICD-10-CM

## 2025-08-21 DIAGNOSIS — E78.2 MIXED HYPERLIPIDEMIA: ICD-10-CM

## 2025-08-21 DIAGNOSIS — I47.29 NSVT (NONSUSTAINED VENTRICULAR TACHYCARDIA) (HCC): Primary | ICD-10-CM

## 2025-08-21 RX ORDER — MAGNESIUM OXIDE 400 MG/1
400 TABLET ORAL DAILY
Qty: 30 TABLET | Refills: 3 | Status: SHIPPED | OUTPATIENT
Start: 2025-08-21

## 2025-08-22 ENCOUNTER — TELEPHONE (OUTPATIENT)
Dept: CARDIOLOGY CLINIC | Age: 65
End: 2025-08-22

## 2025-08-22 DIAGNOSIS — R94.39 ABNORMAL STRESS TEST: ICD-10-CM

## 2025-08-22 DIAGNOSIS — Z01.810 PRE-OPERATIVE CARDIOVASCULAR EXAMINATION: Primary | ICD-10-CM

## 2025-08-25 ENCOUNTER — TELEPHONE (OUTPATIENT)
Dept: CARDIOLOGY CLINIC | Age: 65
End: 2025-08-25

## 2025-08-27 ENCOUNTER — TELEPHONE (OUTPATIENT)
Dept: CARDIOLOGY CLINIC | Age: 65
End: 2025-08-27

## 2025-09-03 ENCOUNTER — HOSPITAL ENCOUNTER (OUTPATIENT)
Dept: GENERAL RADIOLOGY | Age: 65
Discharge: HOME | End: 2025-09-03
Payer: MEDICARE

## 2025-09-03 ENCOUNTER — HOSPITAL ENCOUNTER (OUTPATIENT)
Dept: LAB | Age: 65
Discharge: HOME | End: 2025-09-03
Payer: MEDICARE

## 2025-09-03 DIAGNOSIS — Z01.810 PRE-OPERATIVE CARDIOVASCULAR EXAMINATION: ICD-10-CM

## 2025-09-03 LAB
ABO + RH BLD: NORMAL
ANION GAP SERPL CALCULATED.3IONS-SCNC: 12 MMOL/L (ref 9–17)
BLOOD BANK COMMENT: NORMAL
BLOOD BANK SAMPLE EXPIRATION: NORMAL
BLOOD GROUP ANTIBODIES SERPL: NEGATIVE
BUN SERPL-MCNC: 9 MG/DL (ref 7–20)
CALCIUM SERPL-MCNC: 9.7 MG/DL (ref 8.3–10.6)
CHLORIDE SERPL-SCNC: 101 MMOL/L (ref 99–110)
CO2 SERPL-SCNC: 24 MMOL/L (ref 21–32)
CREAT SERPL-MCNC: 0.6 MG/DL (ref 0.6–1.2)
ERYTHROCYTE [DISTWIDTH] IN BLOOD BY AUTOMATED COUNT: 13.8 % (ref 11.7–14.9)
GFR, ESTIMATED: >90 ML/MIN/1.73M2
GLUCOSE SERPL-MCNC: 83 MG/DL (ref 74–99)
HCT VFR BLD AUTO: 40.2 % (ref 37–47)
HGB BLD-MCNC: 12.8 G/DL (ref 12.5–16)
MCH RBC QN AUTO: 28.1 PG (ref 27–31)
MCHC RBC AUTO-ENTMCNC: 31.8 G/DL (ref 32–36)
MCV RBC AUTO: 88.2 FL (ref 78–100)
PLATELET # BLD AUTO: 301 K/UL (ref 140–440)
PMV BLD AUTO: 9.9 FL (ref 7.5–11.1)
POTASSIUM SERPL-SCNC: 4 MMOL/L (ref 3.5–5.1)
RBC # BLD AUTO: 4.56 M/UL (ref 4.2–5.4)
SODIUM SERPL-SCNC: 138 MMOL/L (ref 136–145)
WBC OTHER # BLD: 7 K/UL (ref 4–10.5)

## 2025-09-03 PROCEDURE — 36415 COLL VENOUS BLD VENIPUNCTURE: CPT

## 2025-09-03 PROCEDURE — 80048 BASIC METABOLIC PNL TOTAL CA: CPT

## 2025-09-03 PROCEDURE — 86901 BLOOD TYPING SEROLOGIC RH(D): CPT

## 2025-09-03 PROCEDURE — 85027 COMPLETE CBC AUTOMATED: CPT

## 2025-09-03 PROCEDURE — 71046 X-RAY EXAM CHEST 2 VIEWS: CPT

## 2025-09-03 PROCEDURE — 86900 BLOOD TYPING SEROLOGIC ABO: CPT

## 2025-09-03 PROCEDURE — 86850 RBC ANTIBODY SCREEN: CPT

## 2025-09-05 ENCOUNTER — HOSPITAL ENCOUNTER (OUTPATIENT)
Age: 65
Setting detail: OUTPATIENT SURGERY
Discharge: HOME | End: 2025-09-05
Attending: INTERNAL MEDICINE | Admitting: INTERNAL MEDICINE
Payer: MEDICARE

## 2025-09-05 VITALS
SYSTOLIC BLOOD PRESSURE: 148 MMHG | HEART RATE: 81 BPM | DIASTOLIC BLOOD PRESSURE: 72 MMHG | WEIGHT: 213 LBS | OXYGEN SATURATION: 100 % | HEIGHT: 67 IN | TEMPERATURE: 98.6 F | BODY MASS INDEX: 33.43 KG/M2

## 2025-09-05 DIAGNOSIS — R94.39 ABNORMAL STRESS TEST: ICD-10-CM

## 2025-09-05 LAB — ECHO BSA: 2.14 M2

## 2025-09-05 PROCEDURE — 6360000004 HC RX CONTRAST MEDICATION: Performed by: INTERNAL MEDICINE

## 2025-09-05 PROCEDURE — 2709999900 HC NON-CHARGEABLE SUPPLY: Performed by: INTERNAL MEDICINE

## 2025-09-05 PROCEDURE — C1894 INTRO/SHEATH, NON-LASER: HCPCS | Performed by: INTERNAL MEDICINE

## 2025-09-05 PROCEDURE — 7100000010 HC PHASE II RECOVERY - FIRST 15 MIN: Performed by: INTERNAL MEDICINE

## 2025-09-05 PROCEDURE — 6360000002 HC RX W HCPCS: Performed by: INTERNAL MEDICINE

## 2025-09-05 PROCEDURE — 7100000011 HC PHASE II RECOVERY - ADDTL 15 MIN: Performed by: INTERNAL MEDICINE

## 2025-09-05 PROCEDURE — 93458 L HRT ARTERY/VENTRICLE ANGIO: CPT | Performed by: INTERNAL MEDICINE

## 2025-09-05 PROCEDURE — 6370000000 HC RX 637 (ALT 250 FOR IP): Performed by: INTERNAL MEDICINE

## 2025-09-05 PROCEDURE — C1769 GUIDE WIRE: HCPCS | Performed by: INTERNAL MEDICINE

## 2025-09-05 PROCEDURE — 2580000003 HC RX 258: Performed by: INTERNAL MEDICINE

## 2025-09-05 RX ORDER — SODIUM CHLORIDE 9 MG/ML
INJECTION, SOLUTION INTRAVENOUS CONTINUOUS
Status: DISCONTINUED | OUTPATIENT
Start: 2025-09-05 | End: 2025-09-05 | Stop reason: HOSPADM

## 2025-09-05 RX ORDER — SODIUM CHLORIDE 9 MG/ML
INJECTION, SOLUTION INTRAVENOUS PRN
Status: DISCONTINUED | OUTPATIENT
Start: 2025-09-05 | End: 2025-09-05 | Stop reason: HOSPADM

## 2025-09-05 RX ORDER — SODIUM CHLORIDE 0.9 % (FLUSH) 0.9 %
5-40 SYRINGE (ML) INJECTION EVERY 12 HOURS SCHEDULED
Status: DISCONTINUED | OUTPATIENT
Start: 2025-09-05 | End: 2025-09-05 | Stop reason: HOSPADM

## 2025-09-05 RX ORDER — SODIUM CHLORIDE 0.9 % (FLUSH) 0.9 %
5-40 SYRINGE (ML) INJECTION PRN
Status: DISCONTINUED | OUTPATIENT
Start: 2025-09-05 | End: 2025-09-05 | Stop reason: HOSPADM

## 2025-09-05 RX ORDER — ACETAMINOPHEN 325 MG/1
650 TABLET ORAL EVERY 4 HOURS PRN
Status: DISCONTINUED | OUTPATIENT
Start: 2025-09-05 | End: 2025-09-05 | Stop reason: HOSPADM

## 2025-09-05 RX ORDER — IOPAMIDOL 755 MG/ML
INJECTION, SOLUTION INTRAVASCULAR PRN
Status: DISCONTINUED | OUTPATIENT
Start: 2025-09-05 | End: 2025-09-05 | Stop reason: HOSPADM

## 2025-09-05 RX ORDER — DIAZEPAM 5 MG/1
5 TABLET ORAL ONCE
Status: COMPLETED | OUTPATIENT
Start: 2025-09-05 | End: 2025-09-05

## 2025-09-05 RX ORDER — DIPHENHYDRAMINE HCL 25 MG
25 TABLET ORAL ONCE
Status: COMPLETED | OUTPATIENT
Start: 2025-09-05 | End: 2025-09-05

## 2025-09-05 RX ADMIN — SODIUM CHLORIDE: 0.9 INJECTION, SOLUTION INTRAVENOUS at 06:09

## 2025-09-05 RX ADMIN — DIPHENHYDRAMINE HYDROCHLORIDE 25 MG: 25 TABLET ORAL at 06:09

## 2025-09-05 RX ADMIN — DIAZEPAM 5 MG: 5 TABLET ORAL at 06:09

## 2025-09-05 ASSESSMENT — PAIN SCALES - GENERAL
PAINLEVEL_OUTOF10: 0
PAINLEVEL_OUTOF10: 0

## (undated) DEVICE — ANGIOGRAPHY KIT CUST MANIFOLD

## (undated) DEVICE — CATHETER ANGIO 4FR L100CM COR NYL AR MOD W/O SIDE H RADPQ

## (undated) DEVICE — Device

## (undated) DEVICE — CATHETER DIAG AD 4FR L110CM 145DEG COR RED HYDRPHLC NYL

## (undated) DEVICE — GUIDEWIRE VASC L150CM DIA0.035IN FLX END L7CM J 3MM PTFE

## (undated) DEVICE — ENDOSCOPY KIT: Brand: MEDLINE INDUSTRIES, INC.

## (undated) DEVICE — CATHETER ANGIO 4FR L100CM S STL NYL JL4 3 SEG BRAID SFT

## (undated) DEVICE — SHEATH INTRO 4FR L10CM MINI GWIRE L45CM 0.035IN PERIPH KINK